# Patient Record
Sex: FEMALE | Race: WHITE | NOT HISPANIC OR LATINO | Employment: OTHER | URBAN - METROPOLITAN AREA
[De-identification: names, ages, dates, MRNs, and addresses within clinical notes are randomized per-mention and may not be internally consistent; named-entity substitution may affect disease eponyms.]

---

## 2017-03-24 ENCOUNTER — CONVERSION ENCOUNTER (OUTPATIENT)
Dept: MAMMOGRAPHY | Facility: CLINIC | Age: 52
End: 2017-03-24

## 2018-04-18 ENCOUNTER — CONVERSION ENCOUNTER (OUTPATIENT)
Dept: MAMMOGRAPHY | Facility: CLINIC | Age: 53
End: 2018-04-18

## 2019-03-25 ENCOUNTER — TRANSCRIBE ORDERS (OUTPATIENT)
Dept: ADMINISTRATIVE | Facility: HOSPITAL | Age: 54
End: 2019-03-25

## 2019-03-25 DIAGNOSIS — Z12.39 SCREENING BREAST EXAMINATION: Primary | ICD-10-CM

## 2019-04-17 ENCOUNTER — HOSPITAL ENCOUNTER (OUTPATIENT)
Dept: MAMMOGRAPHY | Facility: CLINIC | Age: 54
Discharge: HOME | End: 2019-04-17
Payer: COMMERCIAL

## 2019-04-17 VITALS — WEIGHT: 130 LBS | HEIGHT: 63 IN | BODY MASS INDEX: 23.04 KG/M2

## 2019-04-17 DIAGNOSIS — N64.4 BREAST PAIN, RIGHT: ICD-10-CM

## 2019-04-17 DIAGNOSIS — Z12.39 SCREENING BREAST EXAMINATION: ICD-10-CM

## 2019-04-17 PROCEDURE — 77066 DX MAMMO INCL CAD BI: CPT

## 2020-04-30 ENCOUNTER — TRANSCRIBE ORDERS (OUTPATIENT)
Dept: ADMINISTRATIVE | Facility: HOSPITAL | Age: 55
End: 2020-04-30

## 2020-04-30 DIAGNOSIS — Z12.31 VISIT FOR SCREENING MAMMOGRAM: Primary | ICD-10-CM

## 2020-06-05 ENCOUNTER — HOSPITAL ENCOUNTER (OUTPATIENT)
Dept: MAMMOGRAPHY | Facility: CLINIC | Age: 55
Discharge: HOME | End: 2020-06-05
Payer: COMMERCIAL

## 2020-06-05 VITALS — BODY MASS INDEX: 23.04 KG/M2 | WEIGHT: 130 LBS | HEIGHT: 63 IN

## 2020-06-05 DIAGNOSIS — Z12.31 VISIT FOR SCREENING MAMMOGRAM: ICD-10-CM

## 2020-06-05 PROCEDURE — 77067 SCR MAMMO BI INCL CAD: CPT

## 2020-06-05 PROCEDURE — 77063 BREAST TOMOSYNTHESIS BI: CPT

## 2021-03-10 DIAGNOSIS — Z23 ENCOUNTER FOR IMMUNIZATION: ICD-10-CM

## 2021-04-30 ENCOUNTER — TRANSCRIBE ORDERS (OUTPATIENT)
Dept: ADMINISTRATIVE | Facility: HOSPITAL | Age: 56
End: 2021-04-30

## 2021-04-30 DIAGNOSIS — Z12.31 ENCOUNTER FOR SCREENING MAMMOGRAM FOR MALIGNANT NEOPLASM OF BREAST: Primary | ICD-10-CM

## 2021-07-19 ENCOUNTER — HOSPITAL ENCOUNTER (OUTPATIENT)
Dept: MAMMOGRAPHY | Facility: CLINIC | Age: 56
Discharge: HOME | End: 2021-07-19
Payer: COMMERCIAL

## 2021-07-19 VITALS — HEIGHT: 63 IN | WEIGHT: 130 LBS | BODY MASS INDEX: 23.04 KG/M2

## 2021-07-19 DIAGNOSIS — Z12.31 ENCOUNTER FOR SCREENING MAMMOGRAM FOR MALIGNANT NEOPLASM OF BREAST: ICD-10-CM

## 2021-07-19 PROCEDURE — 77067 SCR MAMMO BI INCL CAD: CPT

## 2021-07-19 PROCEDURE — 77063 BREAST TOMOSYNTHESIS BI: CPT

## 2021-07-20 ENCOUNTER — HOSPITAL ENCOUNTER (OUTPATIENT)
Dept: MAMMOGRAPHY | Facility: CLINIC | Age: 56
Discharge: HOME | End: 2021-07-20

## 2021-07-20 DIAGNOSIS — N64.4 BREAST PAIN: ICD-10-CM

## 2021-09-23 PROBLEM — M54.2 CHRONIC NECK PAIN: Status: ACTIVE | Noted: 2018-05-14

## 2021-09-23 PROBLEM — G89.29 CHRONIC NECK PAIN: Status: ACTIVE | Noted: 2018-05-14

## 2021-09-23 PROBLEM — R13.10 DYSPHAGIA: Status: ACTIVE | Noted: 2021-09-23

## 2021-09-23 PROBLEM — R94.31 ABNORMAL EKG: Status: ACTIVE | Noted: 2018-02-26

## 2021-09-30 PROCEDURE — 88305 TISSUE EXAM BY PATHOLOGIST: CPT | Performed by: PATHOLOGY

## 2021-10-01 ENCOUNTER — LAB REQUISITION (OUTPATIENT)
Dept: LAB | Facility: HOSPITAL | Age: 56
End: 2021-10-01
Payer: COMMERCIAL

## 2021-10-01 DIAGNOSIS — R13.10 DYSPHAGIA, UNSPECIFIED: ICD-10-CM

## 2021-10-01 DIAGNOSIS — K29.70 GASTRITIS, UNSPECIFIED, WITHOUT BLEEDING: ICD-10-CM

## 2021-10-01 DIAGNOSIS — K20.0 EOSINOPHILIC ESOPHAGITIS: ICD-10-CM

## 2022-09-12 ENCOUNTER — HOSPITAL ENCOUNTER (OUTPATIENT)
Dept: MAMMOGRAPHY | Facility: MEDICAL CENTER | Age: 57
Discharge: HOME | End: 2022-09-12
Payer: COMMERCIAL

## 2022-09-12 VITALS — BODY MASS INDEX: 23.04 KG/M2 | HEIGHT: 63 IN | WEIGHT: 130 LBS

## 2022-09-12 DIAGNOSIS — Z12.31 ENCOUNTER FOR SCREENING MAMMOGRAM FOR MALIGNANT NEOPLASM OF BREAST: ICD-10-CM

## 2022-09-12 PROCEDURE — 77063 BREAST TOMOSYNTHESIS BI: CPT

## 2022-09-12 PROCEDURE — 77067 SCR MAMMO BI INCL CAD: CPT

## 2023-09-21 ENCOUNTER — HOSPITAL ENCOUNTER (OUTPATIENT)
Dept: MAMMOGRAPHY | Facility: MEDICAL CENTER | Age: 58
Discharge: HOME | End: 2023-09-21
Payer: COMMERCIAL

## 2023-09-21 VITALS — BODY MASS INDEX: 23.4 KG/M2 | WEIGHT: 132.06 LBS | HEIGHT: 63 IN

## 2023-09-21 DIAGNOSIS — Z12.31 ENCOUNTER FOR SCREENING MAMMOGRAM FOR MALIGNANT NEOPLASM OF BREAST: ICD-10-CM

## 2023-09-21 PROCEDURE — 77063 BREAST TOMOSYNTHESIS BI: CPT

## 2023-09-21 PROCEDURE — 77067 SCR MAMMO BI INCL CAD: CPT

## 2023-09-25 ENCOUNTER — HOSPITAL ENCOUNTER (OUTPATIENT)
Dept: ULTRASOUND IMAGING | Facility: CLINIC | Age: 58
Discharge: HOME | End: 2023-09-25
Payer: COMMERCIAL

## 2023-09-25 ENCOUNTER — HOSPITAL ENCOUNTER (OUTPATIENT)
Dept: MAMMOGRAPHY | Facility: CLINIC | Age: 58
Discharge: HOME | End: 2023-09-25
Payer: COMMERCIAL

## 2023-09-25 DIAGNOSIS — R92.8 ABNORMAL SCREENING MAMMOGRAM: ICD-10-CM

## 2023-09-25 PROCEDURE — G0279 TOMOSYNTHESIS, MAMMO: HCPCS

## 2023-09-25 PROCEDURE — 76642 ULTRASOUND BREAST LIMITED: CPT

## 2023-09-25 PROCEDURE — 77065 DX MAMMO INCL CAD UNI: CPT

## 2023-09-25 NOTE — PROGRESS NOTES
Met with patient and Dr. Adriane Diaz  regarding recommendation for;    _____ RIGHT ___X___LEFT      __X___Ultrasound guided  ______Stereotactic breast biopsy. __X___Verbalized understanding.       Blood thinners:  No: __X___ Yes: ______ What:                 Biopsy teaching sheet given:  Yes: ___X___ No: ________    Pt given contact information and adv to call with any questions/needs    Patient advised to arrive at 2:30 pm for a 3:00 pm appointment

## 2023-09-26 ENCOUNTER — HOSPITAL ENCOUNTER (OUTPATIENT)
Dept: ULTRASOUND IMAGING | Facility: CLINIC | Age: 58
Discharge: HOME | End: 2023-09-26
Payer: COMMERCIAL

## 2023-09-26 ENCOUNTER — HOSPITAL ENCOUNTER (OUTPATIENT)
Dept: MAMMOGRAPHY | Facility: CLINIC | Age: 58
Discharge: HOME | End: 2023-09-26
Payer: COMMERCIAL

## 2023-09-26 VITALS — DIASTOLIC BLOOD PRESSURE: 84 MMHG | HEART RATE: 78 BPM | SYSTOLIC BLOOD PRESSURE: 140 MMHG

## 2023-09-26 DIAGNOSIS — R92.8 ABNORMAL SCREENING MAMMOGRAM: ICD-10-CM

## 2023-09-26 PROCEDURE — A4648 IMPLANTABLE TISSUE MARKER: HCPCS

## 2023-09-26 PROCEDURE — 19083 BX BREAST 1ST LESION US IMAG: CPT

## 2023-09-26 PROCEDURE — 88341 IMHCHEM/IMCYTCHM EA ADD ANTB: CPT | Performed by: STUDENT IN AN ORGANIZED HEALTH CARE EDUCATION/TRAINING PROGRAM

## 2023-09-26 PROCEDURE — 88305 TISSUE EXAM BY PATHOLOGIST: CPT | Performed by: STUDENT IN AN ORGANIZED HEALTH CARE EDUCATION/TRAINING PROGRAM

## 2023-09-26 PROCEDURE — 88342 IMHCHEM/IMCYTCHM 1ST ANTB: CPT | Performed by: STUDENT IN AN ORGANIZED HEALTH CARE EDUCATION/TRAINING PROGRAM

## 2023-09-26 RX ORDER — LIDOCAINE HYDROCHLORIDE 10 MG/ML
5 INJECTION, SOLUTION EPIDURAL; INFILTRATION; INTRACAUDAL; PERINEURAL ONCE
Status: COMPLETED | OUTPATIENT
Start: 2023-09-26 | End: 2023-09-26

## 2023-09-26 RX ADMIN — LIDOCAINE HYDROCHLORIDE 5 ML: 10 INJECTION, SOLUTION EPIDURAL; INFILTRATION; INTRACAUDAL; PERINEURAL at 14:43

## 2023-09-26 NOTE — PROGRESS NOTES
Ice pack given:    ___X__yes _____no    Discharge instructions signed by patient:    ____X_yes _____no    Discharge instructions given to patient:    ___X__yes _____no    Discharged via:    __X___ambulatory    _____wheelchair    _____stretcher    Stable on discharge:    __X___yes ____no

## 2023-09-26 NOTE — PROGRESS NOTES
Patient arrived via:    __X___ambulatory    _____wheelchair    _____stretcher      Domestic violence screen    ___X___negative______positive    Breast Implants:    ___x____yes _____no

## 2023-09-26 NOTE — PROGRESS NOTES
Procedure type:    __x___ultrasound guided _____stereotactic    Breast:    ___x__Left _____Right    Location:2:00 6 CMFN    Needle:12    # of passes:4    Clip:BUTTERFLY    Performed by: Dr. Romero Vicente held for 5 minutes by: Brandi Ramos RN    Stersteff Strips:    ___X__yes _____no    Ara Hunger aid:    __X___yes_____no    Tolerated procedure:    __X___yes _____no

## 2023-09-27 NOTE — PROGRESS NOTES
Post procedure call completed    Bleeding: _____yes __X___no (Pt denies)    Pain: ___X__yes ______no (Pt states she has some soreness and is taking Tylenol and Advil as needed)    Redness/Swelling: ______yes ___X___no    Band aid removed: _____yes ___X__no (discussed removing when she showers)    Steri-Strips intact: ___X___yes _____no (discussed with patient to remove steri strips on Sunday if they have not come off on their own)    Pt with no questions at this time, adv will call when results available, adv to call with any questions or concerns, has name/# for contact.

## 2023-09-29 ENCOUNTER — TELEPHONE (OUTPATIENT)
Dept: MAMMOGRAPHY | Facility: CLINIC | Age: 58
End: 2023-09-29

## 2023-09-29 ENCOUNTER — TELEPHONE (OUTPATIENT)
Dept: HEMATOLOGY ONCOLOGY | Facility: CLINIC | Age: 58
End: 2023-09-29

## 2023-09-29 PROCEDURE — 88305 TISSUE EXAM BY PATHOLOGIST: CPT | Performed by: STUDENT IN AN ORGANIZED HEALTH CARE EDUCATION/TRAINING PROGRAM

## 2023-09-29 PROCEDURE — 88342 IMHCHEM/IMCYTCHM 1ST ANTB: CPT | Performed by: STUDENT IN AN ORGANIZED HEALTH CARE EDUCATION/TRAINING PROGRAM

## 2023-09-29 PROCEDURE — 88341 IMHCHEM/IMCYTCHM EA ADD ANTB: CPT | Performed by: STUDENT IN AN ORGANIZED HEALTH CARE EDUCATION/TRAINING PROGRAM

## 2023-09-29 NOTE — TELEPHONE ENCOUNTER
[facility] Surgical Oncology Referral    Diagnosis: intraductal papilloma with atypical ductal hyperplasia    Is this diagnosis cancer (Y/N): N    Biopsy Date: 9/26/23    Does the patient have another biopsy pending: N  If so, when:    Preferred provider: Dr Chris Zhong    Preferred location:     Any requests for dates/times: Mondays - pt available all day. Thursdays - pt only available in the morning.      Any additional information:     Please advise when appointment is made YES

## 2023-09-29 NOTE — TELEPHONE ENCOUNTER
High Risk Call to Patient    Call placed to patient and she was given biopsy results, questions answered, adv next step is to make appt with breast surgeon, options discussed and patient chose [address], patient informed [facility] [place] referral will be sent and they will be in touch to set up an appointment. Pt with no further questions at this time, pt has my name/# for any further needs/concerns.

## 2023-09-29 NOTE — TELEPHONE ENCOUNTER
(RBC)  I called Cj Hess in response to a referral that was received for patient to establish care with Surgical Oncology. Outreach was made to schedule a consultation. I left a voicemail explaining the reason for my call and advised patient to call [facility] at [phone]. Another attempt will be made to contact patient. [facility] Surgical Oncology Referral     Diagnosis: intraductal papilloma with atypical ductal hyperplasia     Is this diagnosis cancer (Y/N): N     Biopsy Date: 9/26/23     Does the patient have another biopsy pending: N  If so, when:     Preferred provider: Dr Chris Zhong     Preferred location:      Any requests for dates/times: Mondays - pt available all day.  Thursdays - pt only available in the morning.      Any additional information:      Please advise when appointment is made YES

## 2023-09-29 NOTE — TELEPHONE ENCOUNTER
High Risk Call to Patient  Call placed to patient and she was given biopsy results, questions answered, adv next step is to make appt with breast surgeon, options discussed and patient chose Dr Unique Cerna, patient referred to the [facility] [place] and they will reach out to set up an appointment for follow up, pt with no further questions at this time, pt has my name/# for any further needs/concerns. Pathology report and note routed to the patient's provider Dr Joe Tapia.

## 2023-10-06 ENCOUNTER — TELEPHONE (OUTPATIENT)
Dept: SURGICAL ONCOLOGY | Facility: CLINIC | Age: 58
End: 2023-10-06

## 2023-10-13 ENCOUNTER — TELEPHONE (OUTPATIENT)
Dept: HEMATOLOGY ONCOLOGY | Facility: CLINIC | Age: 58
End: 2023-10-13

## 2023-10-13 NOTE — TELEPHONE ENCOUNTER
Patient Call    Who are you speaking with? Patient    If it is not the patient, are they listed on an active communication consent form? N/A   What is the reason for this call? Pt called for a sooner appt with Dr. Lang Mendoza. I advised there was nothing sooner than 11/22   Does this require a call back? N/A   If a call back is required, please list best call back number N/a   If a call back is required, advise that a message will be forwarded to their care team and someone will return their call as soon as possible. Did you relay this information to the patient?  N/A

## 2023-10-16 ENCOUNTER — TELEPHONE (OUTPATIENT)
Dept: SURGICAL ONCOLOGY | Facility: CLINIC | Age: 58
End: 2023-10-16

## 2023-10-16 NOTE — TELEPHONE ENCOUNTER
Called and spoke to patient and offered to moved her appointment up from 11/22/23. Patient was agreeable to new appointment on 10/17/23 at [address] [facility] in the [facility] office. Confirmed location.

## 2023-10-17 ENCOUNTER — CONSULT (OUTPATIENT)
Dept: SURGICAL ONCOLOGY | Facility: CLINIC | Age: 58
End: 2023-10-17
Payer: COMMERCIAL

## 2023-10-17 VITALS
OXYGEN SATURATION: 98 % | HEART RATE: 79 BPM | RESPIRATION RATE: 16 BRPM | SYSTOLIC BLOOD PRESSURE: 154 MMHG | TEMPERATURE: 97.5 F | DIASTOLIC BLOOD PRESSURE: 108 MMHG | BODY MASS INDEX: 23.11 KG/M2 | WEIGHT: 130.4 LBS | HEIGHT: 63 IN

## 2023-10-17 DIAGNOSIS — Z80.0 FAMILY HISTORY OF PANCREATIC CANCER: ICD-10-CM

## 2023-10-17 DIAGNOSIS — D36.9 INTRADUCTAL PAPILLOMA: ICD-10-CM

## 2023-10-17 DIAGNOSIS — N60.92 ATYPICAL DUCTAL HYPERPLASIA OF LEFT BREAST: Primary | ICD-10-CM

## 2023-10-17 DIAGNOSIS — Z80.3 FAMILY HISTORY OF BREAST CANCER: ICD-10-CM

## 2023-10-17 PROCEDURE — 99214 OFFICE O/P EST MOD 30 MIN: CPT | Performed by: SURGERY

## 2023-10-17 RX ORDER — IBUPROFEN 600 MG/1
TABLET ORAL
COMMUNITY

## 2023-10-17 RX ORDER — MELOXICAM 7.5 MG/1
TABLET ORAL
COMMUNITY

## 2023-10-17 RX ORDER — TRIAMCINOLONE ACETONIDE 1 MG/G
CREAM TOPICAL
COMMUNITY

## 2023-10-17 RX ORDER — VALACYCLOVIR HYDROCHLORIDE 1 G/1
TABLET, FILM COATED ORAL AS NEEDED
COMMUNITY

## 2023-10-17 RX ORDER — LIDOCAINE HYDROCHLORIDE 20 MG/ML
INJECTION, SOLUTION INFILTRATION; PERINEURAL
COMMUNITY

## 2023-10-17 RX ORDER — CEFAZOLIN SODIUM 1 G/50ML
1000 SOLUTION INTRAVENOUS
OUTPATIENT
Start: 2023-10-17

## 2023-10-17 RX ORDER — ALPRAZOLAM 0.25 MG/1
0.25 TABLET ORAL 3 TIMES DAILY PRN
COMMUNITY
Start: 2023-09-27 | End: 2024-03-25

## 2023-10-17 RX ORDER — FUROSEMIDE 20 MG/1
TABLET ORAL
COMMUNITY

## 2023-10-17 RX ORDER — METHYLPREDNISOLONE ACETATE 40 MG/ML
INJECTION, SUSPENSION INTRA-ARTICULAR; INTRALESIONAL; INTRAMUSCULAR; SOFT TISSUE
COMMUNITY
Start: 2023-08-10

## 2023-10-17 RX ORDER — MICONAZOLE NITRATE, ZINC OXIDE, WHITE PETROLATUM 2.5; 150; 813.5 MG/G; MG/G; MG/G
OINTMENT TOPICAL AS NEEDED
COMMUNITY

## 2023-10-17 NOTE — PROGRESS NOTES
Breast Consultation-Surgical Oncology     [facility]  [facility] [address] [facility] [id number]    Name:  Laine Trevino  YOB: 1965  MRN:  [id number]    Assessment/Plan   Diagnoses and all orders for this visit:    Atypical ductal hyperplasia of left breast    Family history of breast cancer  -     Ambulatory Referral to Oncology Genetics; Future    Intraductal papilloma    Family history of pancreatic cancer  -     Ambulatory Referral to Oncology Genetics; Future            HPI: Laine Trevino is a 60y.o. year old female who presents with abnormal imaging of the left breast.  She then went on to have a diagnostic mammogram, ultrasound and biopsy revealing an intraductal papilloma with atypical duct hyperplasia. She does have family history of breast and pancreatic cancer. She is interested in having genetic testing. Surgical treatment to date consisted of not applicable. Oncology History:    Oncology History    No history exists.        Pertinent reproductive history:  Age at menarche:    OB History          4    Para   4    Term   4            AB        Living             SAB        IAB        Ectopic        Multiple        Live Births   4                   Problem List:   Patient Active Problem List   Diagnosis    Tobacco use disorder    Seasonal allergic rhinitis    Hypertriglyceridemia    Family history of breast cancer    Atopic dermatitis and related condition    Chronic neck pain    Abnormal EKG    Dysphagia    Intraductal papilloma    Atypical ductal hyperplasia of left breast    Family history of pancreatic cancer     Past Medical History:   Diagnosis Date    Anxiety     Hypertension      Past Surgical History:   Procedure Laterality Date    AUGMENTATION MAMMAPLASTY Bilateral     saline    BREAST BIOPSY Left 2023    EGD  2021    Intrinsic moderate stenosis dilated with 47 [address], erosive gastritis-biopsy negative for H. pylori by Dr. Nelly Anthony      age 45    [place] Left 9/26/2023     Family History   Problem Relation Age of Onset    Breast cancer Mother 59    No Known Problems Father     No Known Problems Daughter     No Known Problems Maternal Grandmother     No Known Problems Maternal Grandfather     No Known Problems Paternal Grandmother     No Known Problems Paternal Grandfather     Breast cancer Maternal Aunt 72     Social History     Socioeconomic History    Marital status: /Civil Union     Spouse name: Not on file    Number of children: Not on file    Years of education: Not on file    Highest education level: Not on file   Occupational History    Occupation: [employer]   Tobacco Use    Smoking status: Some Days     Types: Cigarettes    Smokeless tobacco: Never    Tobacco comments:     1 cig a day    Substance and Sexual Activity    Alcohol use:  Yes     Alcohol/week: 7.0 standard drinks of alcohol     Types: 7 Glasses of wine per week    Drug use: Never    Sexual activity: Not on file   Other Topics Concern    Not on file   Social History Narrative    Not on file     Social Determinants of Health     Financial Resource Strain: Not on file   Food Insecurity: Not on file   Transportation Needs: Not on file   Physical Activity: Not on file   Stress: Not on file   Social Connections: Not on file   Intimate Partner Violence: Not on file   Housing Stability: Not on file     Current Outpatient Medications   Medication Sig Dispense Refill    ALPRAZolam (XANAX) 0.25 mg tablet Take 0.25 mg by mouth Three times daily as needed      Cholecalciferol (VITAMIN D3 PO) Take by mouth in the morning      clonazePAM (KlonoPIN) 0.5 mg tablet Take 0.5 mg by mouth if needed      Cyanocobalamin (VITAMIN B-12 PO) Take by mouth in the morning      famotidine (PEPCID) 40 MG tablet Take 1 tablet (40 mg total) by mouth 2 (two) times a day 60 tablet 5    furosemide (LASIX) 20 mg tablet furosemide 20 mg tablet   TAKE ONE-HALF TABLET BY MOUTH DAILY AS NEEDED FOR LEG SWELLING      ibuprofen (MOTRIN) 600 mg tablet TAKE ONE TABLET BY MOUTH EVERY 8 HOURS AS NEEDED FOR MILD PAIN      lidocaine (XYLOCAINE) 2 % lidocaine HCl 20 mg/mL (2 %) injection solution   administered      meloxicam (MOBIC) 7.5 mg tablet meloxicam 7.5 mg tablet   TAKE ONE TABLET BY MOUTH EVERY DAY AS NEEDED      methylPREDNISolone acetate (DEPO-Medrol) 40 mg/mL injection administered      Miconazole-Zinc Oxide-Petrolat 0.25-15-81.35 % OINT if needed (for rash on lips)      Omega-3 Fatty Acids (FISH OIL PO) Take by mouth in the morning      Probiotic Product (PROBIOTIC PO) Take by mouth in the morning      Pyridoxine HCl (B-6 PO) Take by mouth      triamcinolone (KENALOG) 0.1 % cream APPLY TWICE DAILY TO AFFECTED AREAS AS DIRECTED      valACYclovir (VALTREX) 1,000 mg tablet if needed      valsartan (DIOVAN) 80 mg tablet Take 160 mg by mouth daily      omeprazole (PriLOSEC) 20 mg delayed release capsule Take 20 mg by mouth daily (Patient not taking: Reported on 2/16/2023)       No current facility-administered medications for this visit. Allergies   Allergen Reactions    Sulfa Antibiotics Hives    Amlodipine Other (See Comments)     Possible  ankle swelling on 5 mg    Lisinopril Other (See Comments)     dizziness    Methocarbamol Other (See Comments)     Nightmares    Other Sneezing     Seasonal allergies    Celebrex [Celecoxib] Tongue Swelling    Sulfamethoxazole-Trimethoprim Hives         The following portions of the patient's history were reviewed and updated as appropriate: allergies, current medications, past family history, past medical history, past social history, past surgical history, and problem list.    Review of Systems:  Review of Systems   Constitutional: Negative. Negative for appetite change, fever and unexpected weight change. HENT: Negative. Negative for trouble swallowing.     Eyes: Negative. Respiratory: Negative. Negative for cough and shortness of breath. Cardiovascular: Negative. Negative for chest pain. Gastrointestinal: Negative. Negative for abdominal pain, nausea and vomiting. Endocrine: Negative. Genitourinary: Negative. Negative for dysuria. Musculoskeletal: Negative. Negative for arthralgias and myalgias. Skin: Negative. Allergic/Immunologic: Positive for environmental allergies. Neurological: Negative. Negative for headaches. Hematological: Negative. Negative for adenopathy. Does not bruise/bleed easily. Psychiatric/Behavioral: Negative. Physical Exam:  Physical Exam  Vitals reviewed. Constitutional:       General: She is not in acute distress. Appearance: Normal appearance. She is well-developed. HENT:      Head: Normocephalic and atraumatic. Neck:      Thyroid: No thyromegaly. Vascular: No JVD. Trachea: No tracheal deviation. Cardiovascular:      Heart sounds: Normal heart sounds. Pulmonary:      Breath sounds: Normal breath sounds. Chest:   Breasts:     Breasts are symmetrical.      Right: No swelling, bleeding, inverted nipple, mass, nipple discharge, skin change or tenderness. Left: Skin change (resolving ecchymosis) present. No swelling, bleeding, inverted nipple, mass, nipple discharge or tenderness. Abdominal:      Palpations: Abdomen is soft. There is no hepatomegaly. Musculoskeletal:      Right lower leg: No edema. Left lower leg: No edema. Lymphadenopathy:      Upper Body:      Right upper body: No supraclavicular, axillary or pectoral adenopathy. Left upper body: No supraclavicular, axillary or pectoral adenopathy. Neurological:      Mental Status: She is alert and oriented to person, place, and time. Cranial Nerves: No cranial nerve deficit.    Psychiatric:         Mood and Affect: Mood normal.         Behavior: Behavior normal.     Laboratory:    9/26/2023 core biopsy left breast 2:00      Pathology revealed: Intraductal papilloma with atypical duct hyperplasia            Imagin2023 bilateral 3D screening mammogram was a BI-RADS 0 secondary to a mass in the 2:00 axis    2023 left 3D diagnostic mammogram and ultrasound shows the same with a 5 mm correlate seen on ultrasound for which biopsy was recommended as noted above    2023 postbiopsy mammogram is concordant, standard clip in place            Discussion/Summary: 39-year-old female who presents with abnormal imaging of the left breast.  Her biopsy revealed a papilloma with atypical ductal hyperplasia. She was counseled on PELON localized lumpectomy. She understands the rationale for this. We also discussed her family history of breast cancer in her mother and maternal aunt as well as pancreatic cancer in the maternal uncle. I am referring her to our genetic counselors as well. All of her questions were answered. Consent was signed today in the office. She will be scheduled for surgery in the near term. She needs to have her [facility] reflector placed.

## 2023-10-17 NOTE — PROGRESS NOTES
Call placed to patient regarding recommendation for;    ___ RIGHT ____x__LEFT      __X___SAVI  placement. Procedure explained to patient, additional questions answered at this time    __X___Verbalized understanding.       Blood thinners:  _____yes __X___no    Date stopped: ___N/A____    All teaching points discussed during call, pt with no questions at this time, pt adv to arrive at 1:00 for 1:30 insertion    Pt given name/# for any further questions/needs

## 2023-10-18 ENCOUNTER — TELEPHONE (OUTPATIENT)
Dept: HEMATOLOGY ONCOLOGY | Facility: CLINIC | Age: 58
End: 2023-10-18

## 2023-10-18 NOTE — TELEPHONE ENCOUNTER
I called Leonel Steward in response to a referral that was received for patient to establish care with Cancer Risk and Genetics. Outreach was made to schedule a consultation. The patient states she was seen in Dr Kellen Steward office, and that she needed a letter of medical necessity and supporting documents to make sure that the testing would be covered. The patient asked that I send a message to Dr Kellen Steward office for an update on these documents. The referral has been closed. The patient is aware she can schedule at a later date when she is ready.

## 2023-10-18 NOTE — TELEPHONE ENCOUNTER
Patient Call    Who are you speaking with? Patient    If it is not the patient, are they listed on an active communication consent form? N/A   What is the reason for this call? The patient would like an update on whether Dr Matthew Figueroa was able to write her letter of medical necessity and send the supporting documents to her insurance so that she is able to know if her genetic testing will be covered. Patient does not wish to schedule until this is complete. Does this require a call back? Yes   If a call back is required, please list best call back number [phone]   If a call back is required, advise that a message will be forwarded to their care team and someone will return their call as soon as possible. Did you relay this information to the patient?  Yes

## 2023-10-19 ENCOUNTER — APPOINTMENT (OUTPATIENT)
Dept: LAB | Facility: HOSPITAL | Age: 58
End: 2023-10-19
Payer: COMMERCIAL

## 2023-10-19 ENCOUNTER — LAB (OUTPATIENT)
Dept: LAB | Facility: HOSPITAL | Age: 58
End: 2023-10-19
Payer: COMMERCIAL

## 2023-10-19 ENCOUNTER — HOSPITAL ENCOUNTER (OUTPATIENT)
Dept: RADIOLOGY | Facility: HOSPITAL | Age: 58
Discharge: HOME | End: 2023-10-19
Payer: COMMERCIAL

## 2023-10-19 DIAGNOSIS — D36.9 INTRADUCTAL PAPILLOMA: ICD-10-CM

## 2023-10-19 DIAGNOSIS — N60.92 ATYPICAL DUCTAL HYPERPLASIA OF LEFT BREAST: ICD-10-CM

## 2023-10-19 LAB
ALBUMIN SERPL BCP-MCNC: 4.5 G/DL (ref 3.5–5)
ALP SERPL-CCNC: 54 U/L (ref 34–104)
ALT SERPL W P-5'-P-CCNC: 15 U/L (ref 7–52)
ANION GAP SERPL CALCULATED.3IONS-SCNC: 5 MMOL/L
AST SERPL W P-5'-P-CCNC: 18 U/L (ref 13–39)
ATRIAL RATE: 74 BPM
BACTERIA UR QL AUTO: NORMAL /HPF
BASOPHILS # BLD AUTO: 0.08 THOUSANDS/ÂΜL (ref 0–0.1)
BASOPHILS NFR BLD AUTO: 1 % (ref 0–1)
BILIRUB SERPL-MCNC: 0.92 MG/DL (ref 0.2–1)
BILIRUB UR QL STRIP: NEGATIVE
BUN SERPL-MCNC: 14 MG/DL (ref 5–25)
CALCIUM SERPL-MCNC: 9.1 MG/DL (ref 8.4–10.2)
CHLORIDE SERPL-SCNC: 103 MMOL/L (ref 96–108)
CLARITY UR: CLEAR
CO2 SERPL-SCNC: 29 MMOL/L (ref 21–32)
COLOR UR: COLORLESS
CREAT SERPL-MCNC: 0.71 MG/DL (ref 0.6–1.3)
EOSINOPHIL # BLD AUTO: 0.55 THOUSAND/ÂΜL (ref 0–0.61)
EOSINOPHIL NFR BLD AUTO: 9 % (ref 0–6)
ERYTHROCYTE [DISTWIDTH] IN BLOOD BY AUTOMATED COUNT: 11.9 % (ref 11.6–15.1)
GFR SERPL CREATININE-BSD FRML MDRD: 93 ML/MIN/1.73SQ M
GLUCOSE P FAST SERPL-MCNC: 86 MG/DL (ref 65–99)
GLUCOSE UR STRIP-MCNC: NEGATIVE MG/DL
HCT VFR BLD AUTO: 46.8 % (ref 34.8–46.1)
HGB BLD-MCNC: 15.2 G/DL (ref 11.5–15.4)
HGB UR QL STRIP.AUTO: ABNORMAL
IMM GRANULOCYTES # BLD AUTO: 0.01 THOUSAND/UL (ref 0–0.2)
IMM GRANULOCYTES NFR BLD AUTO: 0 % (ref 0–2)
KETONES UR STRIP-MCNC: NEGATIVE MG/DL
LEUKOCYTE ESTERASE UR QL STRIP: NEGATIVE
LYMPHOCYTES # BLD AUTO: 1.97 THOUSANDS/ÂΜL (ref 0.6–4.47)
LYMPHOCYTES NFR BLD AUTO: 30 % (ref 14–44)
MCH RBC QN AUTO: 31 PG (ref 26.8–34.3)
MCHC RBC AUTO-ENTMCNC: 32.5 G/DL (ref 31.4–37.4)
MCV RBC AUTO: 95 FL (ref 82–98)
MONOCYTES # BLD AUTO: 0.49 THOUSAND/ÂΜL (ref 0.17–1.22)
MONOCYTES NFR BLD AUTO: 8 % (ref 4–12)
NEUTROPHILS # BLD AUTO: 3.39 THOUSANDS/ÂΜL (ref 1.85–7.62)
NEUTS SEG NFR BLD AUTO: 52 % (ref 43–75)
NITRITE UR QL STRIP: NEGATIVE
NON-SQ EPI CELLS URNS QL MICRO: NORMAL /HPF
NRBC BLD AUTO-RTO: 0 /100 WBCS
P AXIS: 43 DEGREES
PH UR STRIP.AUTO: 5.5 [PH]
PLATELET # BLD AUTO: 403 THOUSANDS/UL (ref 149–390)
PMV BLD AUTO: 10 FL (ref 8.9–12.7)
POTASSIUM SERPL-SCNC: 3.5 MMOL/L (ref 3.5–5.3)
PR INTERVAL: 132 MS
PROT SERPL-MCNC: 7.2 G/DL (ref 6.4–8.4)
PROT UR STRIP-MCNC: NEGATIVE MG/DL
QRS AXIS: -15 DEGREES
QRSD INTERVAL: 90 MS
QT INTERVAL: 376 MS
QTC INTERVAL: 417 MS
RBC # BLD AUTO: 4.91 MILLION/UL (ref 3.81–5.12)
RBC #/AREA URNS AUTO: NORMAL /HPF
SODIUM SERPL-SCNC: 137 MMOL/L (ref 135–147)
SP GR UR STRIP.AUTO: 1.01 (ref 1–1.03)
T WAVE AXIS: 43 DEGREES
UROBILINOGEN UR STRIP-ACNC: <2 MG/DL
VENTRICULAR RATE: 74 BPM
WBC # BLD AUTO: 6.49 THOUSAND/UL (ref 4.31–10.16)
WBC #/AREA URNS AUTO: NORMAL /HPF

## 2023-10-19 PROCEDURE — 80053 COMPREHEN METABOLIC PANEL: CPT

## 2023-10-19 PROCEDURE — 36415 COLL VENOUS BLD VENIPUNCTURE: CPT

## 2023-10-19 PROCEDURE — 93005 ELECTROCARDIOGRAM TRACING: CPT

## 2023-10-19 PROCEDURE — 71046 X-RAY EXAM CHEST 2 VIEWS: CPT

## 2023-10-19 PROCEDURE — 81001 URINALYSIS AUTO W/SCOPE: CPT | Performed by: SURGERY

## 2023-10-19 PROCEDURE — 85025 COMPLETE CBC W/AUTO DIFF WBC: CPT

## 2023-10-20 NOTE — PRE-PROCEDURE INSTRUCTIONS
Pre-Surgery Instructions:   Medication Instructions    ALPRAZolam (XANAX) 0.25 mg tablet Uses PRN- OK to take day of surgery    Cholecalciferol (VITAMIN D3 PO) Stop taking 7 days prior to surgery. clonazePAM (KlonoPIN) 0.5 mg tablet Take night before surgery    Cyanocobalamin (VITAMIN B-12 PO) Stop taking 7 days prior to surgery. famotidine (PEPCID) 40 MG tablet Uses PRN- OK to take day of surgery    furosemide (LASIX) 20 mg tablet Hold day of surgery. ibuprofen (MOTRIN) 600 mg tablet Stop taking 7 days prior to surgery. Miconazole-Zinc Oxide-Petrolat 0.25-15-81.35 % OINT Hold day of surgery. Omega-3 Fatty Acids (FISH OIL PO) Stop taking 7 days prior to surgery. Probiotic Product (PROBIOTIC PO) Instructions provided by MD    Pyridoxine HCl (B-6 PO) Stop taking 7 days prior to surgery. triamcinolone (KENALOG) 0.1 % cream Uses PRN- DO NOT take day of surgery    valsartan (DIOVAN) 80 mg tablet Hold day of surgery. Medication instructions for day surgery reviewed. Please use only a sip of water to take your instructed medications. Avoid all over the counter vitamins, supplements and NSAIDS for one week prior to surgery per anesthesia guidelines. Tylenol is ok to take as needed. You will receive a call one business day prior to surgery with an arrival time and hospital directions. If your surgery is scheduled on a Monday, the hospital will be calling you on the Friday prior to your surgery. If you have not heard from anyone by 8pm, please call the hospital supervisor through the hospital  at [phone]. Kelsey Flowers [phone]). Do not eat or drink anything after midnight the night before your surgery, including candy, mints, lifesavers, or chewing gum. Do not drink alcohol 24hrs before your surgery. Try not to smoke at least 24hrs before your surgery.        Follow the pre surgery showering instructions as listed in the [facility] Surgical Experience Booklet” or otherwise provided by your surgeon's office. Do not shave the surgical area 24 hours before surgery. Do not apply any lotions, creams, including makeup, cologne, deodorant, or perfumes after showering on the day of your surgery. No contact lenses, eye make-up, or artificial eyelashes. Remove nail polish, including gel polish, and any artificial, gel, or acrylic nails if possible. Remove all jewelry including rings and body piercing jewelry. Wear causal clothing that is easy to take on and off. Consider your type of surgery. Keep any valuables, jewelry, piercings at home. Please bring any specially ordered equipment (sling, braces) if indicated. Arrange for a responsible person to drive you to and from the hospital on the day of your surgery. Visitor Guidelines discussed. Call the surgeon's office with any new illnesses, exposures, or additional questions prior to surgery. Please reference your [facility] Surgical Experience Booklet” for additional information to prepare for your upcoming surgery.

## 2023-10-23 ENCOUNTER — HOSPITAL ENCOUNTER (OUTPATIENT)
Dept: ULTRASOUND IMAGING | Facility: CLINIC | Age: 58
Discharge: HOME | End: 2023-10-23

## 2023-10-27 ENCOUNTER — HOSPITAL ENCOUNTER (OUTPATIENT)
Dept: ULTRASOUND IMAGING | Facility: CLINIC | Age: 58
Discharge: HOME | End: 2023-10-27
Payer: COMMERCIAL

## 2023-10-27 ENCOUNTER — HOSPITAL ENCOUNTER (OUTPATIENT)
Dept: MAMMOGRAPHY | Facility: CLINIC | Age: 58
Discharge: HOME | End: 2023-10-27
Payer: COMMERCIAL

## 2023-10-27 VITALS — SYSTOLIC BLOOD PRESSURE: 142 MMHG | DIASTOLIC BLOOD PRESSURE: 84 MMHG | HEART RATE: 76 BPM

## 2023-10-27 DIAGNOSIS — D36.9 INTRADUCTAL PAPILLOMA: ICD-10-CM

## 2023-10-27 DIAGNOSIS — N60.92 ATYPICAL DUCTAL HYPERPLASIA OF LEFT BREAST: ICD-10-CM

## 2023-10-27 DIAGNOSIS — R92.8 ABNORMAL MAMMOGRAM: ICD-10-CM

## 2023-10-27 PROCEDURE — 19285 PERQ DEV BREAST 1ST US IMAG: CPT

## 2023-10-27 RX ORDER — LIDOCAINE HYDROCHLORIDE 10 MG/ML
5 INJECTION, SOLUTION EPIDURAL; INFILTRATION; INTRACAUDAL; PERINEURAL ONCE
Status: COMPLETED | OUTPATIENT
Start: 2023-10-27 | End: 2023-10-27

## 2023-10-27 RX ADMIN — LIDOCAINE HYDROCHLORIDE 5 ML: 10 INJECTION, SOLUTION EPIDURAL; INFILTRATION; INTRACAUDAL; PERINEURAL at 13:36

## 2023-10-27 NOTE — PROGRESS NOTES
Procedure type:    ___x__ultrasound guided _____stereotactic    Breast:    ___x__Left _____Right    Location: 2 o'clock 6cmfn    Needle:16G    # of passes:1    Clip:7.5cm Laine  Clip Placement    Performed by:Dr. Jose Diamond    Pressure held for 5 minutes by:Jerica Giordano    Stersteff Strips:    ___yes ___x__no    Band aid:    ____yes__x___no (paper tape)    Tolerated procedure:    __X___yes _____no

## 2023-10-30 ENCOUNTER — ANESTHESIA EVENT (OUTPATIENT)
Dept: PERIOP | Facility: HOSPITAL | Age: 58
End: 2023-10-30
Payer: COMMERCIAL

## 2023-10-30 NOTE — PROGRESS NOTES
Post procedure call completed 10/30/2023 9 ;15    Bleeding: _____yes __X___no    Pain: _____yes ___X___no    Redness/Swelling: ______yes ___X___no    Band aid removed: __x___yes ___no         Pt with no questions at this time, , adv to call with any questions or concerns, has name/# for contact

## 2023-10-31 ENCOUNTER — ANESTHESIA (OUTPATIENT)
Dept: PERIOP | Facility: HOSPITAL | Age: 58
End: 2023-10-31
Payer: COMMERCIAL

## 2023-10-31 ENCOUNTER — APPOINTMENT (OUTPATIENT)
Dept: MAMMOGRAPHY | Facility: HOSPITAL | Age: 58
End: 2023-10-31
Payer: COMMERCIAL

## 2023-10-31 ENCOUNTER — HOSPITAL ENCOUNTER (OUTPATIENT)
Facility: HOSPITAL | Age: 58
Setting detail: OUTPATIENT SURGERY
Discharge: HOME | End: 2023-10-31
Attending: SURGERY | Admitting: SURGERY
Payer: COMMERCIAL

## 2023-10-31 VITALS
HEIGHT: 63 IN | OXYGEN SATURATION: 95 % | SYSTOLIC BLOOD PRESSURE: 135 MMHG | TEMPERATURE: 97.8 F | HEART RATE: 70 BPM | RESPIRATION RATE: 16 BRPM | BODY MASS INDEX: 22.81 KG/M2 | WEIGHT: 128.75 LBS | DIASTOLIC BLOOD PRESSURE: 72 MMHG

## 2023-10-31 DIAGNOSIS — N60.92 ATYPICAL DUCTAL HYPERPLASIA OF LEFT BREAST: ICD-10-CM

## 2023-10-31 DIAGNOSIS — D36.9 INTRADUCTAL PAPILLOMA: ICD-10-CM

## 2023-10-31 PROCEDURE — 76098 X-RAY EXAM SURGICAL SPECIMEN: CPT | Performed by: SURGERY

## 2023-10-31 PROCEDURE — 19301 PARTIAL MASTECTOMY: CPT | Performed by: PHYSICIAN ASSISTANT

## 2023-10-31 PROCEDURE — 88342 IMHCHEM/IMCYTCHM 1ST ANTB: CPT | Performed by: PATHOLOGY

## 2023-10-31 PROCEDURE — 19301 PARTIAL MASTECTOMY: CPT | Performed by: SURGERY

## 2023-10-31 PROCEDURE — 88341 IMHCHEM/IMCYTCHM EA ADD ANTB: CPT | Performed by: PATHOLOGY

## 2023-10-31 PROCEDURE — 88307 TISSUE EXAM BY PATHOLOGIST: CPT | Performed by: PATHOLOGY

## 2023-10-31 RX ORDER — DIPHENHYDRAMINE HYDROCHLORIDE 50 MG/ML
INJECTION INTRAMUSCULAR; INTRAVENOUS AS NEEDED
Status: DISCONTINUED | OUTPATIENT
Start: 2023-10-31 | End: 2023-10-31

## 2023-10-31 RX ORDER — SODIUM CHLORIDE 9 MG/ML
125 INJECTION, SOLUTION INTRAVENOUS CONTINUOUS
Status: DISCONTINUED | OUTPATIENT
Start: 2023-10-31 | End: 2023-10-31 | Stop reason: HOSPADM

## 2023-10-31 RX ORDER — ONDANSETRON 2 MG/ML
4 INJECTION INTRAMUSCULAR; INTRAVENOUS ONCE AS NEEDED
Status: DISCONTINUED | OUTPATIENT
Start: 2023-10-31 | End: 2023-10-31 | Stop reason: HOSPADM

## 2023-10-31 RX ORDER — BUPIVACAINE HYDROCHLORIDE 5 MG/ML
INJECTION, SOLUTION EPIDURAL; INTRACAUDAL AS NEEDED
Status: DISCONTINUED | OUTPATIENT
Start: 2023-10-31 | End: 2023-10-31 | Stop reason: HOSPADM

## 2023-10-31 RX ORDER — PHENYLEPHRINE HCL IN 0.9% NACL 1 MG/10 ML
SYRINGE (ML) INTRAVENOUS AS NEEDED
Status: DISCONTINUED | OUTPATIENT
Start: 2023-10-31 | End: 2023-10-31

## 2023-10-31 RX ORDER — MIDAZOLAM HYDROCHLORIDE 2 MG/2ML
INJECTION, SOLUTION INTRAMUSCULAR; INTRAVENOUS AS NEEDED
Status: DISCONTINUED | OUTPATIENT
Start: 2023-10-31 | End: 2023-10-31

## 2023-10-31 RX ORDER — ONDANSETRON 2 MG/ML
INJECTION INTRAMUSCULAR; INTRAVENOUS AS NEEDED
Status: DISCONTINUED | OUTPATIENT
Start: 2023-10-31 | End: 2023-10-31

## 2023-10-31 RX ORDER — ACETAMINOPHEN 325 MG/1
650 TABLET ORAL EVERY 6 HOURS PRN
Status: DISCONTINUED | OUTPATIENT
Start: 2023-10-31 | End: 2023-10-31 | Stop reason: HOSPADM

## 2023-10-31 RX ORDER — DEXAMETHASONE SODIUM PHOSPHATE 10 MG/ML
INJECTION, SOLUTION INTRAMUSCULAR; INTRAVENOUS AS NEEDED
Status: DISCONTINUED | OUTPATIENT
Start: 2023-10-31 | End: 2023-10-31

## 2023-10-31 RX ORDER — FENTANYL CITRATE 50 UG/ML
INJECTION, SOLUTION INTRAMUSCULAR; INTRAVENOUS AS NEEDED
Status: DISCONTINUED | OUTPATIENT
Start: 2023-10-31 | End: 2023-10-31

## 2023-10-31 RX ORDER — PROPOFOL 10 MG/ML
INJECTION, EMULSION INTRAVENOUS AS NEEDED
Status: DISCONTINUED | OUTPATIENT
Start: 2023-10-31 | End: 2023-10-31

## 2023-10-31 RX ORDER — LIDOCAINE HYDROCHLORIDE 20 MG/ML
INJECTION, SOLUTION EPIDURAL; INFILTRATION; INTRACAUDAL; PERINEURAL AS NEEDED
Status: DISCONTINUED | OUTPATIENT
Start: 2023-10-31 | End: 2023-10-31

## 2023-10-31 RX ORDER — KETOROLAC TROMETHAMINE 30 MG/ML
INJECTION, SOLUTION INTRAMUSCULAR; INTRAVENOUS AS NEEDED
Status: DISCONTINUED | OUTPATIENT
Start: 2023-10-31 | End: 2023-10-31

## 2023-10-31 RX ORDER — FENTANYL CITRATE/PF 50 MCG/ML
50 SYRINGE (ML) INJECTION
Status: DISCONTINUED | OUTPATIENT
Start: 2023-10-31 | End: 2023-10-31 | Stop reason: HOSPADM

## 2023-10-31 RX ORDER — CEFAZOLIN SODIUM 1 G/50ML
1000 SOLUTION INTRAVENOUS
Status: DISCONTINUED | OUTPATIENT
Start: 2023-10-31 | End: 2023-10-31 | Stop reason: HOSPADM

## 2023-10-31 RX ADMIN — CEFAZOLIN SODIUM 1000 MG: 1 SOLUTION INTRAVENOUS at 07:35

## 2023-10-31 RX ADMIN — FENTANYL CITRATE 25 MCG: 50 INJECTION, SOLUTION INTRAMUSCULAR; INTRAVENOUS at 07:51

## 2023-10-31 RX ADMIN — DIPHENHYDRAMINE HYDROCHLORIDE 25 MG: 50 INJECTION, SOLUTION INTRAMUSCULAR; INTRAVENOUS at 08:28

## 2023-10-31 RX ADMIN — KETOROLAC TROMETHAMINE 30 MG: 30 INJECTION, SOLUTION INTRAMUSCULAR; INTRAVENOUS at 08:27

## 2023-10-31 RX ADMIN — SODIUM CHLORIDE: 0.9 INJECTION, SOLUTION INTRAVENOUS at 08:26

## 2023-10-31 RX ADMIN — SODIUM CHLORIDE 125 ML/HR: 0.9 INJECTION, SOLUTION INTRAVENOUS at 05:50

## 2023-10-31 RX ADMIN — ACETAMINOPHEN 325MG 650 MG: 325 TABLET ORAL at 10:03

## 2023-10-31 RX ADMIN — Medication 100 MCG: at 08:18

## 2023-10-31 RX ADMIN — LIDOCAINE HYDROCHLORIDE 60 MG: 20 INJECTION, SOLUTION EPIDURAL; INFILTRATION; INTRACAUDAL at 07:33

## 2023-10-31 RX ADMIN — DEXAMETHASONE SODIUM PHOSPHATE 10 MG: 10 INJECTION INTRAMUSCULAR; INTRAVENOUS at 07:42

## 2023-10-31 RX ADMIN — PROPOFOL 200 MG: 10 INJECTION, EMULSION INTRAVENOUS at 07:33

## 2023-10-31 RX ADMIN — FENTANYL CITRATE 50 MCG: 50 INJECTION, SOLUTION INTRAMUSCULAR; INTRAVENOUS at 07:30

## 2023-10-31 RX ADMIN — MIDAZOLAM 2 MG: 1 INJECTION INTRAMUSCULAR; INTRAVENOUS at 07:30

## 2023-10-31 RX ADMIN — Medication 100 MCG: at 08:10

## 2023-10-31 RX ADMIN — FENTANYL CITRATE 25 MCG: 50 INJECTION, SOLUTION INTRAMUSCULAR; INTRAVENOUS at 08:14

## 2023-10-31 RX ADMIN — ONDANSETRON 4 MG: 2 INJECTION INTRAMUSCULAR; INTRAVENOUS at 08:14

## 2023-10-31 NOTE — DISCHARGE INSTR - AVS FIRST PAGE
POST-OPERATIVE CARE INSTRUCTIONS       Care after your procedure:   General  Rest and relax for 24 hours, then gradually return to normal activities. Do not preform any heavy lifting or strenuous physical activities for 14 days. Your activity restrictions will be re-evaluated at your post op visit. Drink clear liquids until you are certain there is no nausea, then resume a normal diet. Do not drink alcohol, drive any vehicle, operate mechanical equipment or make critical decisions for at least 24 hours and until you are off any narcotic pain medications. The Incision  Your incision is closed with:   dissolvable stiches just underneath the skin. The incision is also covered with:                          clear waterproof glue  A gauze-pad is covering the wound. Wound care  Remove your gauze-pad after 24 hours. You may then shower using soap and water to clean your incision. Gently dry the wound. You may redress your wound with additional gauze and tape if you choose. A little bruising at the wound site is normal.    Medication  Resume all previous medications  Take either Naproxen (Aleve) one tablet every 8 hours or Ibuprofen(Advil/Motrin) one(1) to two(2) tablets every 6 hours around the clock for the first 2-3 days. Take this even if you don't think you need it for at least the first 24 hours. Pain Medication Instructions: may also use over the counter tylenol          Other (If applicable)  Wear a post-surgical bra around the clock. May use ice to the incision site(s) for the next 24-48 hours, twice daily.    Call your  doctor if you have any of the following:  Redness, swelling, heat, drainage, and/or bleeding from your wound  Chills or fever ( above 101' F )  Pain, not relieved with the above medications  If you have any questions or problems call our office [phone]    Follow-up appointment:  As scheduled

## 2023-10-31 NOTE — INTERVAL H&P NOTE
H&P reviewed. After examining the patient I find no changes in the patients condition since the H&P had been written.     Vitals:    10/31/23 0547   BP: 140/88   Pulse: 82   Resp: 16   Temp: 98.3 °F (36.8 °C)   SpO2: 96%

## 2023-10-31 NOTE — ANESTHESIA POSTPROCEDURE EVALUATION
Post-Op Assessment Note    CV Status:  Stable    Pain management: adequate     Mental Status:  Alert and awake   Hydration Status:  Euvolemic   PONV Controlled:  Controlled   Airway Patency:  Patent      Post Op Vitals Reviewed: Yes      Staff: Anesthesiologist         No notable events documented.     BP      Temp      Pulse     Resp      SpO2      /76   Pulse 68   Temp 97.7 °F (36.5 °C)   Resp 12   Ht 5' 3" (1.6 m)   Wt 58.4 kg (128 lb 12 oz)   SpO2 95%   BMI 22.81 kg/m² \

## 2023-10-31 NOTE — OP NOTE
OPERATIVE REPORT  PATIENT NAME: Indigo Lovell    :  1965  MRN: [id number]  Pt Location: [facility] OR ROOM 05    SURGERY DATE: 10/31/2023    Surgeon(s) and Role:     * Keke Skelton MD - Primary     * Binu Estrada PA-C - Assisting    Preop Diagnosis:  Intraductal papilloma [D36.9]  Atypical ductal hyperplasia of left breast [N60.92]    Post-Op Diagnosis Codes:     * Intraductal papilloma [D36.9]     * Atypical ductal hyperplasia of left breast [N60.92]    Procedure(s):  Left - LUMPECTOMY BREAST LOU  LOCALIZED  Use of lou   Specimen radiograph    Specimen(s):  ID Type Source Tests Collected by Time Destination   1 : left lumpectomy, short suture superior, long suture lateral Tissue Soft Tissue, Other TISSUE EXAM Keke Skelton MD 10/31/2023 6586    2 : new lateral margin left breast, suture marks true margin Tissue Soft Tissue, Other TISSUE EXAM Keke Skelton MD 10/31/2023 9201    3 : new left breast posterior margin, suture marks true margin Tissue Soft Tissue, Other TISSUE EXAM Keke Skelton MD 10/31/2023 5740    4 : new left breast inferior margin, suture marks true margin Tissue Soft Tissue, Other TISSUE EXAM Keke Skelton MD 10/31/2023 3257        Estimated Blood Loss:   Minimal    Drains:  * No LDAs found *    Anesthesia Type:   General    Operative Indications:  Intraductal papilloma [D36.9]  Atypical ductal hyperplasia of left breast [N60.92]      Operative Findings:  Lou reflector and clip in specimen    Complications:   None    Procedure and Technique:  Brenda Kruse is a 69-year-old female who presents with a left breast intraductal papilloma with associated atypical duct hyperplasia. She was counseled on surgical resection. She presented the day of surgery to the operating room. She had preoperative antibiotics. She was administered general anesthesia. She was prepped and draped in the usual standard fashion. Timeout was performed.   Attention was turned to the upper outer left breast.  The savvy probe was used to identify the shortest distance to the reflector. The skin was marked in this location. Half percent Marcaine plain was injected for local anesthesia. An elliptical incision was created through the skin and subcutaneous tissue. Electrocautery was used to dissect down to the area of concern. This was elevated into the wound using an Allis clamp. A small margin of tissue was excised in all directions. The savvy probe was used throughout to help guide dissection. The specimen was marked with a short stitch superior and a long stitch lateral.  The specimen was interrogated with the Laine probe to confirm reflector removal.  It was also imaged in the operating room revealing Laine reflector with standard clip closest to the lateral, inferior and posterior margins. Therefore small margins were excised in these locations and sutures were placed on the true margins. All breast specimens were submitted to pathology in formalin. Her wound was irrigated and hemostasis was achieved. The wound was then closed in a layered fashion using multiple interrupted 3-0 Monocryl suture and a running 4-0 Monocryl subcuticular stitch. All counts were correct. The skin was cleaned and dried. Surgical glue, fluffs and a bra were applied. The patient was then extubated and taken to recovery in stable condition. A physician assistant was required during the procedure for retraction, tissue handling, dissection and suturing; no residents were available.     Patient Disposition:  extubated and stable    This procedure was not performed to treat breast cancer through sentinel node biopsy      SIGNATURE: Serafin Clancy MD  DATE: October 31, 2023  TIME: 8:27 AM

## 2023-10-31 NOTE — ANESTHESIA PREPROCEDURE EVALUATION
Procedure:  LUMPECTOMY BREAST PELON  LOCALIZED (Left: Breast)    Relevant Problems   CARDIO   (+) Hypertriglyceridemia                          (+) HTN    (+) GERD  Physical Exam    Airway    Mallampati score: II  TM Distance: >3 FB  Neck ROM: full     Dental       Cardiovascular  Rhythm: regular    Pulmonary   Breath sounds clear to auscultation    Other Findings        Anesthesia Plan  ASA Score- 2     Anesthesia Type- general with ASA Monitors. Additional Monitors:     Airway Plan:            Plan Factors-Exercise tolerance (METS): >4 METS. Chart reviewed. Existing labs reviewed. Patient is not a current smoker. Induction- intravenous. Postoperative Plan- Plan for postoperative opioid use. Planned trial extubation    Informed Consent- Anesthetic plan and risks discussed with patient.

## 2023-11-01 ENCOUNTER — ANCILLARY PROCEDURE (OUTPATIENT)
Dept: LAB | Facility: HOSPITAL | Age: 58
End: 2023-11-01
Attending: SURGERY

## 2023-11-06 ENCOUNTER — PATIENT MESSAGE (OUTPATIENT)
Dept: SURGICAL ONCOLOGY | Facility: CLINIC | Age: 58
End: 2023-11-06

## 2023-11-07 ENCOUNTER — DOCUMENTATION (OUTPATIENT)
Dept: HEMATOLOGY ONCOLOGY | Facility: CLINIC | Age: 58
End: 2023-11-07

## 2023-11-07 PROCEDURE — 88342 IMHCHEM/IMCYTCHM 1ST ANTB: CPT | Performed by: PATHOLOGY

## 2023-11-07 PROCEDURE — 88341 IMHCHEM/IMCYTCHM EA ADD ANTB: CPT | Performed by: PATHOLOGY

## 2023-11-07 PROCEDURE — 88307 TISSUE EXAM BY PATHOLOGIST: CPT | Performed by: PATHOLOGY

## 2023-11-07 NOTE — PROGRESS NOTES
Pt called me & asked about a letter she recvd from the ins co. I went over the info with her & she thanked me for the info

## 2023-11-07 NOTE — TELEPHONE ENCOUNTER
Called and spoke with Juliann Gonzalez regarding her questions about terminology of lumpectomy vs partial mastectomy. Provided contact information for Financial Counselor, Pranay Holloway to further clarify for her. She was appreciative.

## 2023-11-16 ENCOUNTER — OFFICE VISIT (OUTPATIENT)
Dept: SURGICAL ONCOLOGY | Facility: CLINIC | Age: 58
End: 2023-11-16

## 2023-11-16 ENCOUNTER — TELEPHONE (OUTPATIENT)
Dept: GENETICS | Facility: CLINIC | Age: 58
End: 2023-11-16

## 2023-11-16 VITALS
HEIGHT: 63 IN | DIASTOLIC BLOOD PRESSURE: 86 MMHG | WEIGHT: 130.2 LBS | OXYGEN SATURATION: 98 % | BODY MASS INDEX: 23.07 KG/M2 | TEMPERATURE: 98 F | SYSTOLIC BLOOD PRESSURE: 136 MMHG | HEART RATE: 79 BPM

## 2023-11-16 DIAGNOSIS — Z80.3 FAMILY HISTORY OF BREAST CANCER: ICD-10-CM

## 2023-11-16 DIAGNOSIS — E78.1 HYPERTRIGLYCERIDEMIA: Primary | ICD-10-CM

## 2023-11-16 DIAGNOSIS — Z80.0 FAMILY HISTORY OF PANCREATIC CANCER: ICD-10-CM

## 2023-11-16 DIAGNOSIS — D36.9 INTRADUCTAL PAPILLOMA: Primary | ICD-10-CM

## 2023-11-16 DIAGNOSIS — N60.92 ATYPICAL DUCTAL HYPERPLASIA OF LEFT BREAST: ICD-10-CM

## 2023-11-16 PROCEDURE — 99024 POSTOP FOLLOW-UP VISIT: CPT | Performed by: SURGERY

## 2023-11-16 NOTE — TELEPHONE ENCOUNTER
I called Margot Enciso to schedule a new patient appointment with the Cancer Risk and Genetics Program.      Outcome:   I left a voice message encouraging the patient to call the [facility] [place] at [phone] to schedule this appointment. A [employer] message was also send with our contact information. Patient has a family hx of cancer. Follow-up:   At this time the referral will be closed and we will wait to hear back from the patient regarding scheduling this appointment.

## 2023-11-16 NOTE — PROGRESS NOTES
60 y.o. female is here today s/p left lumpectomy for a papilloma with atypical ductal hyperplasia plasia. She reports no concerns. Physical Exam  Constitutional:       General: She is not in acute distress. Appearance: Normal appearance. Chest:   Breasts:     Left: Skin change (well healing incision, no signs of infection) present. Neurological:      Mental Status: She is alert and oriented to person, place, and time. Psychiatric:         Mood and Affect: Mood normal.         Data:   10/31/2023 left lumpectomy reveals an intraductal papilloma with atypical duct hyperplasia but no evidence of malignancy        Diagnoses and all orders for this visit:    Intraductal papilloma    Atypical ductal hyperplasia of left breast  -     MRI breast bilateral w and wo contrast w cad; Future    Family history of breast cancer  -     MRI breast bilateral w and wo contrast w cad; Future        Assessment/Plan: 65-year-old female status post left lumpectomy for a papilloma with atypical duct hyperplasia. Her final pathology reveals the same. She is healing well with no signs of infection. I discussed these findings with her. Additionally she has family history of both breast and pancreatic cancer. I placed a referral to oncology genetics. She would like to do this in the new year. I will reach out to them again. I recommended alternating mammography and breast MRI. I will make arrangements for an MRI for March. We will see her again in 6 months in our high-risk clinic or sooner should the need arise.

## 2023-11-17 ENCOUNTER — TELEPHONE (OUTPATIENT)
Dept: HEMATOLOGY ONCOLOGY | Facility: CLINIC | Age: 58
End: 2023-11-17

## 2023-11-17 NOTE — TELEPHONE ENCOUNTER
I spoke with Nahun Brandon to schedule their consultation with Cancer Risk and Genetics. Scheduling Outcome: Patient is scheduled for an appointment on 5/16/2024 at 9:30am with Joselo Archibald    Personal/Family History Related to Appointment:     Personal History of Cancer: Patient reports no personal history of cancer    Family History of Cancer: Patient reports family history of mother breast cancer twice, maternal aunt had breast cancer, maternal uncle pancreatic cancer    Is patient of [address] [place] [address], [address]?: No    History of Genetic Testing:  Personal History of Genetic Testing: Patient report no personal history of Genetic Testing. Family History of Genetic Testing: Patient reports that no family members have had Genetic Testing. Progeny:  Is patient able to complete our family history questionnaire on a computer?:  Yes    Patient's preferred e-mail address: [email or web address]

## 2023-11-21 ENCOUNTER — TELEPHONE (OUTPATIENT)
Dept: HEMATOLOGY ONCOLOGY | Facility: CLINIC | Age: 58
End: 2023-11-21

## 2023-11-21 NOTE — TELEPHONE ENCOUNTER
Patient Call    Who are you speaking with? Patient    If it is not the patient, are they listed on an active communication consent form? N/A   What is the reason for this call? Patient calling to see if there were any cancellations for Genetics so she could be seen sooner. I informed patient there are no sooner appointments and she is on the waitlist and the hopeline will reach out if there are any cancellations. Patient verbalized her understanding. Does this require a call back? No   If a call back is required, please list best call back number N/a   If a call back is required, advise that a message will be forwarded to their care team and someone will return their call as soon as possible. Did you relay this information to the patient?  No

## 2023-11-22 NOTE — H&P (VIEW-ONLY)
Breast Consultation-Surgical Oncology     [facility]  [facility] [id number]    Name:  Zina Montoya  YOB: 1965  MRN:  [id number]    Assessment/Plan   Diagnoses and all orders for this visit:    Atypical ductal hyperplasia of left breast    Family history of breast cancer  -     Ambulatory Referral to Oncology Genetics; Future    Intraductal papilloma    Family history of pancreatic cancer  -     Ambulatory Referral to Oncology Genetics; Future            HPI: Zina Montoya is a 60y.o. year old female who presents with abnormal imaging of the left breast.  She then went on to have a diagnostic mammogram, ultrasound and biopsy revealing an intraductal papilloma with atypical duct hyperplasia. She does have family history of breast and pancreatic cancer. She is interested in having genetic testing. Surgical treatment to date consisted of not applicable. Oncology History:    Oncology History    No history exists.        Pertinent reproductive history:  Age at menarche:    OB History          4    Para   4    Term   4            AB        Living             SAB        IAB        Ectopic        Multiple        Live Births   4                   Problem List:   Patient Active Problem List   Diagnosis    Tobacco use disorder    Seasonal allergic rhinitis    Hypertriglyceridemia    Family history of breast cancer    Atopic dermatitis and related condition    Chronic neck pain    Abnormal EKG    Dysphagia    Intraductal papilloma    Atypical ductal hyperplasia of left breast    Family history of pancreatic cancer     Past Medical History:   Diagnosis Date    Anxiety     Hypertension      Past Surgical History:   Procedure Laterality Date    AUGMENTATION MAMMAPLASTY Bilateral     saline    BREAST BIOPSY Left 2023    EGD  2021    Intrinsic moderate stenosis dilated with [address], erosive gastritis-biopsy negative for H. pylori by Dr. Monserrat Polo      age 45    [place] Left 9/26/2023     Family History   Problem Relation Age of Onset    Breast cancer Mother 59    No Known Problems Father     No Known Problems Daughter     No Known Problems Maternal Grandmother     No Known Problems Maternal Grandfather     No Known Problems Paternal Grandmother     No Known Problems Paternal Grandfather     Breast cancer Maternal Aunt 72     Social History     Socioeconomic History    Marital status: /Civil Union     Spouse name: Not on file    Number of children: Not on file    Years of education: Not on file    Highest education level: Not on file   Occupational History    Occupation: [employer]   Tobacco Use    Smoking status: Some Days     Types: Cigarettes    Smokeless tobacco: Never    Tobacco comments:     1 cig a day    Substance and Sexual Activity    Alcohol use:  Yes     Alcohol/week: 7.0 standard drinks of alcohol     Types: 7 Glasses of wine per week    Drug use: Never    Sexual activity: Not on file   Other Topics Concern    Not on file   Social History Narrative    Not on file     Social Determinants of Health     Financial Resource Strain: Not on file   Food Insecurity: Not on file   Transportation Needs: Not on file   Physical Activity: Not on file   Stress: Not on file   Social Connections: Not on file   Intimate Partner Violence: Not on file   Housing Stability: Not on file     Current Outpatient Medications   Medication Sig Dispense Refill    ALPRAZolam (XANAX) 0.25 mg tablet Take 0.25 mg by mouth Three times daily as needed      Cholecalciferol (VITAMIN D3 PO) Take by mouth in the morning      clonazePAM (KlonoPIN) 0.5 mg tablet Take 0.5 mg by mouth if needed      Cyanocobalamin (VITAMIN B-12 PO) Take by mouth in the morning      famotidine (PEPCID) 40 MG tablet Take 1 tablet (40 mg total) by mouth 2 (two) times a day 60 tablet 5    furosemide (LASIX) 20 mg tablet furosemide 20 mg tablet   TAKE ONE-HALF TABLET BY MOUTH DAILY AS NEEDED FOR LEG SWELLING      ibuprofen (MOTRIN) 600 mg tablet TAKE ONE TABLET BY MOUTH EVERY 8 HOURS AS NEEDED FOR MILD PAIN      lidocaine (XYLOCAINE) 2 % lidocaine HCl 20 mg/mL (2 %) injection solution   administered      meloxicam (MOBIC) 7.5 mg tablet meloxicam 7.5 mg tablet   TAKE ONE TABLET BY MOUTH EVERY DAY AS NEEDED      methylPREDNISolone acetate (DEPO-Medrol) 40 mg/mL injection administered      Miconazole-Zinc Oxide-Petrolat 0.25-15-81.35 % OINT if needed (for rash on lips)      Omega-3 Fatty Acids (FISH OIL PO) Take by mouth in the morning      Probiotic Product (PROBIOTIC PO) Take by mouth in the morning      Pyridoxine HCl (B-6 PO) Take by mouth      triamcinolone (KENALOG) 0.1 % cream APPLY TWICE DAILY TO AFFECTED AREAS AS DIRECTED      valACYclovir (VALTREX) 1,000 mg tablet if needed      valsartan (DIOVAN) 80 mg tablet Take 160 mg by mouth daily      omeprazole (PriLOSEC) 20 mg delayed release capsule Take 20 mg by mouth daily (Patient not taking: Reported on 2/16/2023)       No current facility-administered medications for this visit. Allergies   Allergen Reactions    Sulfa Antibiotics Hives    Amlodipine Other (See Comments)     Possible  ankle swelling on 5 mg    Lisinopril Other (See Comments)     dizziness    Methocarbamol Other (See Comments)     Nightmares    Other Sneezing     Seasonal allergies    Celebrex [Celecoxib] Tongue Swelling    Sulfamethoxazole-Trimethoprim Hives         The following portions of the patient's history were reviewed and updated as appropriate: allergies, current medications, past family history, past medical history, past social history, past surgical history, and problem list.    Review of Systems:  Review of Systems   Constitutional: Negative. Negative for appetite change, fever and unexpected weight change. HENT: Negative. Negative for trouble swallowing.     Eyes: Negative. Respiratory: Negative. Negative for cough and shortness of breath. Cardiovascular: Negative. Negative for chest pain. Gastrointestinal: Negative. Negative for abdominal pain, nausea and vomiting. Endocrine: Negative. Genitourinary: Negative. Negative for dysuria. Musculoskeletal: Negative. Negative for arthralgias and myalgias. Skin: Negative. Allergic/Immunologic: Positive for environmental allergies. Neurological: Negative. Negative for headaches. Hematological: Negative. Negative for adenopathy. Does not bruise/bleed easily. Psychiatric/Behavioral: Negative. Physical Exam:  Physical Exam  Vitals reviewed. Constitutional:       General: She is not in acute distress. Appearance: Normal appearance. She is well-developed. HENT:      Head: Normocephalic and atraumatic. Neck:      Thyroid: No thyromegaly. Vascular: No JVD. Trachea: No tracheal deviation. Cardiovascular:      Heart sounds: Normal heart sounds. Pulmonary:      Breath sounds: Normal breath sounds. Chest:   Breasts:     Breasts are symmetrical.      Right: No swelling, bleeding, inverted nipple, mass, nipple discharge, skin change or tenderness. Left: Skin change (resolving ecchymosis) present. No swelling, bleeding, inverted nipple, mass, nipple discharge or tenderness. Abdominal:      Palpations: Abdomen is soft. There is no hepatomegaly. Musculoskeletal:      Right lower leg: No edema. Left lower leg: No edema. Lymphadenopathy:      Upper Body:      Right upper body: No supraclavicular, axillary or pectoral adenopathy. Left upper body: No supraclavicular, axillary or pectoral adenopathy. Neurological:      Mental Status: She is alert and oriented to person, place, and time. Cranial Nerves: No cranial nerve deficit.    Psychiatric:         Mood and Affect: Mood normal.         Behavior: Behavior normal.     Laboratory:    9/26/2023 core biopsy left breast 2:00      Pathology revealed: Intraductal papilloma with atypical duct hyperplasia            Imagin2023 bilateral 3D screening mammogram was a BI-RADS 0 secondary to a mass in the 2:00 axis    2023 left 3D diagnostic mammogram and ultrasound shows the same with a 5 mm correlate seen on ultrasound for which biopsy was recommended as noted above    2023 postbiopsy mammogram is concordant, standard clip in place            Discussion/Summary: 54-year-old female who presents with abnormal imaging of the left breast.  Her biopsy revealed a papilloma with atypical ductal hyperplasia. She was counseled on PELON localized lumpectomy. She understands the rationale for this. We also discussed her family history of breast cancer in her mother and maternal aunt as well as pancreatic cancer in the maternal uncle. I am referring her to our genetic counselors as well. All of her questions were answered. Consent was signed today in the office. She will be scheduled for surgery in the near term. She needs to have her [facility] reflector placed. 23

## 2024-01-02 ENCOUNTER — TELEPHONE (OUTPATIENT)
Dept: HEMATOLOGY ONCOLOGY | Facility: CLINIC | Age: 59
End: 2024-01-02

## 2024-01-06 ENCOUNTER — OFFICE VISIT (OUTPATIENT)
Dept: URGENT CARE | Age: 59
End: 2024-01-06
Payer: COMMERCIAL

## 2024-01-06 VITALS
OXYGEN SATURATION: 96 % | RESPIRATION RATE: 18 BRPM | HEART RATE: 105 BPM | DIASTOLIC BLOOD PRESSURE: 83 MMHG | TEMPERATURE: 98.7 F | SYSTOLIC BLOOD PRESSURE: 136 MMHG | BODY MASS INDEX: 22.68 KG/M2 | HEIGHT: 63 IN | WEIGHT: 128 LBS

## 2024-01-06 DIAGNOSIS — B34.9 ACUTE VIRAL SYNDROME: Primary | ICD-10-CM

## 2024-01-06 DIAGNOSIS — R50.9 FEVER, UNSPECIFIED FEVER CAUSE: ICD-10-CM

## 2024-01-06 LAB
SARS-COV-2 AG UPPER RESP QL IA: NEGATIVE
VALID CONTROL: NORMAL

## 2024-01-06 PROCEDURE — 99213 OFFICE O/P EST LOW 20 MIN: CPT | Performed by: EMERGENCY MEDICINE

## 2024-01-06 PROCEDURE — 87811 SARS-COV-2 COVID19 W/OPTIC: CPT

## 2024-01-06 NOTE — PROGRESS NOTES
[facility]        NAME: Estella Laird is a 58 y.o. female  : 1965    MRN: [id number]  DATE: 2024  TIME: 8:43 AM    Assessment and Plan   Acute viral syndrome [B34.9]  1. Acute viral syndrome        2. Fever, unspecified fever cause  Poct Covid 19 Rapid Antigen Test        POCT rapid covid: Negative     Patient refused PCR covid/flu    Patient Instructions     Vitamin D3 2000 IU daily  Vitamin C 1000mg twice per day  Multivitamin daily  Fluids and rest  Over the counter cold medication as needed (EX: Mucinex, tylenol/motrin)  Follow up with PCP in 3-5 days.  Proceed to  ER if symptoms worsen.    Chief Complaint     Chief Complaint   Patient presents with    Fever     Fever began today, chill, body aches began yesterday         History of Present Illness       Patient is a 59 yo female with no significant PMH presenting in the clinic today for cold sx x 1 day. Admits fever, chills, body aches, fatigue, and headache. Denies cough, congestion, rhinorrhea, chest pain, SOB, abdominal pain, n/v/d. Admits the use of tylenol for sx management. Denies recent sick contacts.        Review of Systems   Review of Systems   Constitutional:  Positive for chills, fatigue and fever.   HENT:  Negative for congestion, ear pain, postnasal drip, rhinorrhea, sinus pressure, sinus pain and sore throat.    Respiratory:  Negative for cough and shortness of breath.    Cardiovascular:  Negative for chest pain.   Gastrointestinal:  Negative for abdominal pain, diarrhea, nausea and vomiting.   Musculoskeletal:  Positive for myalgias.   Skin:  Negative for rash.   Neurological:  Positive for headaches.         Current Medications       Current Outpatient Medications:     Cholecalciferol (VITAMIN D3 PO), Take by mouth in the morning, Disp: , Rfl:     clonazePAM (KlonoPIN) 0.5 mg tablet, Take 0.5 mg by mouth daily at bedtime, Disp: , Rfl:     Cyanocobalamin (VITAMIN B-12 PO), Take by mouth in the morning, Disp: , Rfl:      Omega-3 Fatty Acids (FISH OIL PO), Take by mouth in the morning, Disp: , Rfl:     Probiotic Product (PROBIOTIC PO), Take by mouth in the morning, Disp: , Rfl:     Pyridoxine HCl (B-6 PO), Take by mouth, Disp: , Rfl:     valACYclovir (VALTREX) 1,000 mg tablet, if needed, Disp: , Rfl:     valsartan (DIOVAN) 80 mg tablet, Take 160 mg by mouth daily, Disp: , Rfl:     ALPRAZolam (XANAX) 0.25 mg tablet, Take 0.25 mg by mouth Three times daily as needed (Patient not taking: Reported on 1/6/2024), Disp: , Rfl:     famotidine (PEPCID) 40 MG tablet, Take 1 tablet (40 mg total) by mouth 2 (two) times a day (Patient taking differently: Take 40 mg by mouth 2 (two) times a day as needed), Disp: 60 tablet, Rfl: 5    furosemide (LASIX) 20 mg tablet, furosemide 20 mg tablet  TAKE ONE-HALF TABLET BY MOUTH DAILY AS NEEDED FOR LEG SWELLING (Patient not taking: Reported on 1/6/2024), Disp: , Rfl:     ibuprofen (MOTRIN) 600 mg tablet, TAKE ONE TABLET BY MOUTH EVERY 8 HOURS AS NEEDED FOR MILD PAIN, Disp: , Rfl:     lidocaine (XYLOCAINE) 2 %, lidocaine HCl 20 mg/mL (2 %) injection solution  administered (Patient not taking: Reported on 10/20/2023), Disp: , Rfl:     meloxicam (MOBIC) 7.5 mg tablet, meloxicam 7.5 mg tablet  TAKE ONE TABLET BY MOUTH EVERY DAY AS NEEDED (Patient not taking: Reported on 10/20/2023), Disp: , Rfl:     methylPREDNISolone acetate (DEPO-Medrol) 40 mg/mL injection, administered (Patient not taking: Reported on 10/20/2023), Disp: , Rfl:     Miconazole-Zinc Oxide-Petrolat 0.25-15-81.35 % OINT, if needed (for rash on lips), Disp: , Rfl:     triamcinolone (KENALOG) 0.1 % cream, APPLY TWICE DAILY TO AFFECTED AREAS AS DIRECTED (Patient not taking: Reported on 1/6/2024), Disp: , Rfl:     Current Allergies     Allergies as of 01/06/2024 - Reviewed 01/06/2024   Allergen Reaction Noted    Celebrex [celecoxib] Tongue Swelling 04/17/2019    Sulfa antibiotics Hives and Tongue Swelling 04/17/2019    Amlodipine Other (See  "Comments) 11/13/2020    Lisinopril Other (See Comments) 10/26/2020    Methocarbamol Other (See Comments) 03/16/2017    Other Sneezing 02/05/2015    Sulfamethoxazole-trimethoprim Hives 02/05/2015            The following portions of the patient's history were reviewed and updated as appropriate: allergies, current medications, past family history, past medical history, past social history, past surgical history and problem list.     Past Medical History:   Diagnosis Date    Anxiety     Breast lump in female     left-  lumpectomy today 10/31/2023    Hypertension        Past Surgical History:   Procedure Laterality Date    AUGMENTATION MAMMAPLASTY Bilateral 2000    saline    BREAST BIOPSY Left 09/26/2023    BREAST LUMPECTOMY Left 10/31/2023    Procedure: LUMPECTOMY BREAST PELON  LOCALIZED;  Surgeon: Magalie Hubbard MD;  Location: [facility];  Service: Surgical Oncology    COLONOSCOPY      EGD  09/30/2021    Intrinsic moderate stenosis dilated with 54 [place] Littlejohn, erosive gastritis-biopsy negative for H. pylori by Dr. Michele    HYSTERECTOMY      age 51    US BREAST NEEDLE LOC LEFT Left 10/27/2023    US GUIDED BREAST BIOPSY LEFT COMPLETE Left 09/26/2023       Family History   Problem Relation Age of Onset    Breast cancer Mother 63        again at 71 years old.    No Known Problems Father     No Known Problems Daughter     Breast cancer Maternal Aunt 65    Pancreatic cancer Maternal Uncle         80's    No Known Problems Maternal Grandmother     No Known Problems Maternal Grandfather     No Known Problems Paternal Grandmother     No Known Problems Paternal Grandfather          Medications have been verified.        Objective   /83   Pulse 105   Temp 98.7 °F (37.1 °C)   Resp 18   Ht 5' 3\" (1.6 m)   Wt 58.1 kg (128 lb)   SpO2 96%   BMI 22.67 kg/m²        Physical Exam     Physical Exam  Vitals reviewed.   Constitutional:       General: She is not in acute distress.     Appearance: Normal appearance. She is " normal weight. She is ill-appearing (mildly).   HENT:      Head: Normocephalic.      Right Ear: Hearing, tympanic membrane, ear canal and external ear normal. No middle ear effusion. There is no impacted cerumen. Tympanic membrane is not erythematous or bulging.      Left Ear: Hearing, tympanic membrane, ear canal and external ear normal.  No middle ear effusion. There is no impacted cerumen. Tympanic membrane is not erythematous or bulging.      Nose: Nose normal. No congestion or rhinorrhea.      Right Sinus: No maxillary sinus tenderness or frontal sinus tenderness.      Left Sinus: No maxillary sinus tenderness or frontal sinus tenderness.      Mouth/Throat:      Lips: Pink.      Mouth: Mucous membranes are moist.      Pharynx: Oropharynx is clear. Uvula midline. No pharyngeal swelling, oropharyngeal exudate, posterior oropharyngeal erythema or uvula swelling.      Tonsils: No tonsillar exudate or tonsillar abscesses. 1+ on the right. 1+ on the left.   Eyes:      General:         Right eye: No discharge.         Left eye: No discharge.      Conjunctiva/sclera: Conjunctivae normal.   Cardiovascular:      Rate and Rhythm: Normal rate and regular rhythm.      Pulses: Normal pulses.      Heart sounds: Normal heart sounds. No murmur heard.     No friction rub. No gallop.   Pulmonary:      Effort: Pulmonary effort is normal.      Breath sounds: Normal breath sounds. No wheezing, rhonchi or rales.   Musculoskeletal:      Cervical back: Normal range of motion and neck supple. No tenderness.   Lymphadenopathy:      Cervical: No cervical adenopathy.   Skin:     General: Skin is warm.      Findings: No rash.   Neurological:      Mental Status: She is alert.   Psychiatric:         Mood and Affect: Mood normal.         Behavior: Behavior normal.

## 2024-01-06 NOTE — PATIENT INSTRUCTIONS
Vitamin D3 2000 IU daily  Vitamin C 1000mg twice per day  Multivitamin daily  Fluids and rest  Over the counter cold medication as needed (EX: Mucinex, tylenol/motrin)  Follow up with PCP in 3-5 days.  Proceed to ER if symptoms worsen.

## 2024-01-29 ENCOUNTER — APPOINTMENT (EMERGENCY)
Dept: RADIOLOGY | Facility: HOSPITAL | Age: 59
End: 2024-01-29
Payer: COMMERCIAL

## 2024-01-29 ENCOUNTER — HOSPITAL ENCOUNTER (OUTPATIENT)
Facility: HOSPITAL | Age: 59
Setting detail: OBSERVATION
Discharge: HOME | End: 2024-01-30
Attending: EMERGENCY MEDICINE | Admitting: INTERNAL MEDICINE
Payer: COMMERCIAL

## 2024-01-29 DIAGNOSIS — I48.91 ATRIAL FIBRILLATION (HCC): Primary | ICD-10-CM

## 2024-01-29 PROBLEM — I10 HYPERTENSION: Status: ACTIVE | Noted: 2024-01-29

## 2024-01-29 LAB
2HR DELTA HS TROPONIN: 15 NG/L
4HR DELTA HS TROPONIN: 27 NG/L
ALBUMIN SERPL BCP-MCNC: 4.5 G/DL (ref 3.5–5)
ALP SERPL-CCNC: 59 U/L (ref 34–104)
ALT SERPL W P-5'-P-CCNC: 13 U/L (ref 7–52)
ANION GAP SERPL CALCULATED.3IONS-SCNC: 8 MMOL/L
AST SERPL W P-5'-P-CCNC: 21 U/L (ref 13–39)
BASOPHILS # BLD AUTO: 0.09 THOUSANDS/ÂΜL (ref 0–0.1)
BASOPHILS NFR BLD AUTO: 1 % (ref 0–1)
BILIRUB SERPL-MCNC: 0.52 MG/DL (ref 0.2–1)
BNP SERPL-MCNC: 22 PG/ML (ref 0–100)
BUN SERPL-MCNC: 20 MG/DL (ref 5–25)
CALCIUM SERPL-MCNC: 9.5 MG/DL (ref 8.4–10.2)
CARDIAC TROPONIN I PNL SERPL HS: 10 NG/L
CARDIAC TROPONIN I PNL SERPL HS: 25 NG/L
CARDIAC TROPONIN I PNL SERPL HS: 37 NG/L
CHLORIDE SERPL-SCNC: 106 MMOL/L (ref 96–108)
CO2 SERPL-SCNC: 27 MMOL/L (ref 21–32)
CREAT SERPL-MCNC: 0.83 MG/DL (ref 0.6–1.3)
EOSINOPHIL # BLD AUTO: 0.59 THOUSAND/ÂΜL (ref 0–0.61)
EOSINOPHIL NFR BLD AUTO: 5 % (ref 0–6)
ERYTHROCYTE [DISTWIDTH] IN BLOOD BY AUTOMATED COUNT: 12.5 % (ref 11.6–15.1)
GFR SERPL CREATININE-BSD FRML MDRD: 77 ML/MIN/1.73SQ M
GLUCOSE SERPL-MCNC: 109 MG/DL (ref 65–140)
HCT VFR BLD AUTO: 47.8 % (ref 34.8–46.1)
HGB BLD-MCNC: 15.9 G/DL (ref 11.5–15.4)
IMM GRANULOCYTES # BLD AUTO: 0.03 THOUSAND/UL (ref 0–0.2)
IMM GRANULOCYTES NFR BLD AUTO: 0 % (ref 0–2)
LYMPHOCYTES # BLD AUTO: 3.02 THOUSANDS/ÂΜL (ref 0.6–4.47)
LYMPHOCYTES NFR BLD AUTO: 26 % (ref 14–44)
MAGNESIUM SERPL-MCNC: 2.2 MG/DL (ref 1.9–2.7)
MCH RBC QN AUTO: 31.2 PG (ref 26.8–34.3)
MCHC RBC AUTO-ENTMCNC: 33.3 G/DL (ref 31.4–37.4)
MCV RBC AUTO: 94 FL (ref 82–98)
MONOCYTES # BLD AUTO: 1.01 THOUSAND/ÂΜL (ref 0.17–1.22)
MONOCYTES NFR BLD AUTO: 9 % (ref 4–12)
NEUTROPHILS # BLD AUTO: 6.72 THOUSANDS/ÂΜL (ref 1.85–7.62)
NEUTS SEG NFR BLD AUTO: 59 % (ref 43–75)
NRBC BLD AUTO-RTO: 0 /100 WBCS
PLATELET # BLD AUTO: 488 THOUSANDS/UL (ref 149–390)
PMV BLD AUTO: 10.1 FL (ref 8.9–12.7)
POTASSIUM SERPL-SCNC: 4.1 MMOL/L (ref 3.5–5.3)
PROT SERPL-MCNC: 7.1 G/DL (ref 6.4–8.4)
RBC # BLD AUTO: 5.1 MILLION/UL (ref 3.81–5.12)
SODIUM SERPL-SCNC: 141 MMOL/L (ref 135–147)
T4 FREE SERPL-MCNC: 0.79 NG/DL (ref 0.61–1.12)
TSH SERPL DL<=0.05 MIU/L-ACNC: 2.67 UIU/ML (ref 0.45–4.5)
WBC # BLD AUTO: 11.46 THOUSAND/UL (ref 4.31–10.16)

## 2024-01-29 PROCEDURE — 83880 ASSAY OF NATRIURETIC PEPTIDE: CPT

## 2024-01-29 PROCEDURE — 93005 ELECTROCARDIOGRAM TRACING: CPT

## 2024-01-29 PROCEDURE — 84439 ASSAY OF FREE THYROXINE: CPT | Performed by: INTERNAL MEDICINE

## 2024-01-29 PROCEDURE — 84484 ASSAY OF TROPONIN QUANT: CPT | Performed by: INTERNAL MEDICINE

## 2024-01-29 PROCEDURE — 83735 ASSAY OF MAGNESIUM: CPT

## 2024-01-29 PROCEDURE — 99285 EMERGENCY DEPT VISIT HI MDM: CPT

## 2024-01-29 PROCEDURE — 96376 TX/PRO/DX INJ SAME DRUG ADON: CPT

## 2024-01-29 PROCEDURE — 85025 COMPLETE CBC W/AUTO DIFF WBC: CPT

## 2024-01-29 PROCEDURE — 36415 COLL VENOUS BLD VENIPUNCTURE: CPT

## 2024-01-29 PROCEDURE — 71045 X-RAY EXAM CHEST 1 VIEW: CPT

## 2024-01-29 PROCEDURE — 99223 1ST HOSP IP/OBS HIGH 75: CPT | Performed by: INTERNAL MEDICINE

## 2024-01-29 PROCEDURE — 99204 OFFICE O/P NEW MOD 45 MIN: CPT | Performed by: STUDENT IN AN ORGANIZED HEALTH CARE EDUCATION/TRAINING PROGRAM

## 2024-01-29 PROCEDURE — 84443 ASSAY THYROID STIM HORMONE: CPT

## 2024-01-29 PROCEDURE — 80053 COMPREHEN METABOLIC PANEL: CPT

## 2024-01-29 PROCEDURE — 99291 CRITICAL CARE FIRST HOUR: CPT | Performed by: EMERGENCY MEDICINE

## 2024-01-29 PROCEDURE — 84484 ASSAY OF TROPONIN QUANT: CPT

## 2024-01-29 PROCEDURE — 96365 THER/PROPH/DIAG IV INF INIT: CPT

## 2024-01-29 RX ORDER — ALPRAZOLAM 0.25 MG/1
0.25 TABLET ORAL ONCE
Status: COMPLETED | OUTPATIENT
Start: 2024-01-29 | End: 2024-01-29

## 2024-01-29 RX ORDER — SODIUM CHLORIDE 9 MG/ML
3 INJECTION INTRAVENOUS
Status: DISCONTINUED | OUTPATIENT
Start: 2024-01-29 | End: 2024-01-30 | Stop reason: HOSPADM

## 2024-01-29 RX ORDER — DILTIAZEM HYDROCHLORIDE 5 MG/ML
15 INJECTION INTRAVENOUS ONCE
Status: COMPLETED | OUTPATIENT
Start: 2024-01-29 | End: 2024-01-29

## 2024-01-29 RX ORDER — VALSARTAN 80 MG/1
160 TABLET ORAL DAILY
Status: DISCONTINUED | OUTPATIENT
Start: 2024-01-30 | End: 2024-01-30

## 2024-01-29 RX ADMIN — DILTIAZEM HYDROCHLORIDE 2.5 MG/HR: 5 INJECTION INTRAVENOUS at 13:36

## 2024-01-29 RX ADMIN — ALPRAZOLAM 0.25 MG: 0.25 TABLET ORAL at 23:42

## 2024-01-29 RX ADMIN — DILTIAZEM HYDROCHLORIDE 30 MG: 30 TABLET ORAL at 23:42

## 2024-01-29 RX ADMIN — APIXABAN 5 MG: 5 TABLET, FILM COATED ORAL at 15:19

## 2024-01-29 RX ADMIN — DILTIAZEM HYDROCHLORIDE 15 MG: 5 INJECTION INTRAVENOUS at 13:35

## 2024-01-29 RX ADMIN — METOPROLOL TARTRATE 25 MG: 25 TABLET, FILM COATED ORAL at 17:05

## 2024-01-29 NOTE — ASSESSMENT & PLAN NOTE
Resenting with atrial fibrillation with rapid ventricular response  Did take Sudafed for upper respiratory infection -possible etiology of her A-fib  Denies any excessive caffeine consumption  Started on diltiazem drip, continue for now  Check TSH and free T4  Echocardiogram pending  Cardiology consult by the emergency room  [id number]: 2  Initiated on Eliquis

## 2024-01-29 NOTE — PLAN OF CARE

## 2024-01-29 NOTE — H&P
[facility]  H&P  Name: Estella Laird 58 y.o. female I MRN: [id number]  Unit/Bed#: ED-15 I Date of Admission: 1/29/2024   Date of Service: 1/29/2024 I Hospital Day: 0        Assessment and Plan  * Atrial fibrillation (HCC)  Assessment & Plan  Resenting with atrial fibrillation with rapid ventricular response  Did take Sudafed for upper respiratory infection -possible etiology of her A-fib  Denies any excessive caffeine consumption  Started on diltiazem drip, continue for now  Check TSH and free T4  Echocardiogram pending  Cardiology consult by the emergency room    Hypertension  Assessment & Plan  Prior to admission on valsartan which patient would like to continue  Monitor with rate control agent, may need to adjust    Intraductal papilloma  Assessment & Plan  History of breast cancer, not on any active treatment        Code Status: Full code    VTE Prophylaxis: Apixaban (Eliquis)  / sequential compression device     Discussion with family: Patient family member at bedside    Anticipated Length of Stay:  Patient will be admitted on an Observation basis with an anticipated length of stay of greater than 2 midnights.   Justification for Hospital Stay: Atrial fibrillation (HCC)     Total Time for Visit, including Counseling / Coordination of Care: 76 minutes.  Greater than 50% of this total time spent on direct patient counseling and coordination of care.    Chief Complaint:     Chief Complaint   Patient presents with    Palpitations     Pt started with palpitations at approx 1100. Denies cp. Hx of anxiety, last panic attack approx 20 years ago, took half a xanax at approx 1130 without relief. Stressors at home. Denies cp. Also complaining of dizziness.        History of Present Illness:    Estella Laird is a 58 y.o. female who presents with heart palpitations, patient thought she was having a panic attack and took a Xanax.  Her past medical history is notable for hypertension for which he is on  valsartan.  In the emergency room she was noted to be in atrial fibrillation with rapid ventricular response.  Her only recent medication change has been she took Sudafed for an upper respiratory infection.  She denies any nausea or vomiting.  No diarrhea.  No history of thyroid issues.  She does have a history of breast cancer status postlumpectomy, not currently on chemotherapy..    Review of Systems:    A complete and comprehensive 14 point organ system review was performed and all other systems are negative other than stated above in the HPI    Past Medical and Surgical History:     Past Medical History:   Diagnosis Date    Anxiety     Breast lump in female     left-  lumpectomy today 10/31/2023    Hypertension        Past Surgical History:   Procedure Laterality Date    AUGMENTATION MAMMAPLASTY Bilateral 2000    saline    BREAST BIOPSY Left 09/26/2023    BREAST LUMPECTOMY Left 10/31/2023    Procedure: LUMPECTOMY BREAST PELON  LOCALIZED;  Surgeon: Magalie Hubbard MD;  Location: [facility];  Service: Surgical Oncology    COLONOSCOPY      EGD  09/30/2021    Intrinsic moderate stenosis dilated with 54 [place] Littlejohn, erosive gastritis-biopsy negative for H. pylori by Dr. Michele    HYSTERECTOMY      age 51    US BREAST NEEDLE LOC LEFT Left 10/27/2023    US GUIDED BREAST BIOPSY LEFT COMPLETE Left 09/26/2023       Meds/Allergies:    Prior to Admission medications    Medication Sig Start Date End Date Taking? Authorizing Provider   ALPRAZolam (XANAX) 0.25 mg tablet Take 0.25 mg by mouth Three times daily as needed 9/27/23 3/25/24 Yes Historical Provider, MD   Cholecalciferol (VITAMIN D3 PO) Take by mouth in the morning   Yes Historical Provider, MD   clonazePAM (KlonoPIN) 0.5 mg tablet Take 0.5 mg by mouth daily at bedtime   Yes Historical Provider, MD   Cyanocobalamin (VITAMIN B-12 PO) Take by mouth in the morning   Yes Historical Provider, MD   famotidine (PEPCID) 40 MG tablet Take 1 tablet (40 mg total) by mouth 2 (two)  times a day  Patient taking differently: Take 40 mg by mouth 2 (two) times a day as needed 2/16/23 1/29/24 Yes GARTH Gaitan   valsartan (DIOVAN) 80 mg tablet Take 160 mg by mouth daily   Yes Historical Provider, MD   ibuprofen (MOTRIN) 600 mg tablet TAKE ONE TABLET BY MOUTH EVERY 8 HOURS AS NEEDED FOR MILD PAIN    Historical Provider, MD   lidocaine (XYLOCAINE) 2 % lidocaine HCl 20 mg/mL (2 %) injection solution   administered  Patient not taking: Reported on 10/20/2023    Historical Provider, MD   meloxicam (MOBIC) 7.5 mg tablet meloxicam 7.5 mg tablet   TAKE ONE TABLET BY MOUTH EVERY DAY AS NEEDED  Patient not taking: Reported on 10/20/2023    Historical Provider, MD   methylPREDNISolone acetate (DEPO-Medrol) 40 mg/mL injection administered  Patient not taking: Reported on 10/20/2023 8/10/23   Historical Provider, MD   Miconazole-Zinc Oxide-Petrolat 0.25-15-81.35 % OINT if needed (for rash on lips)    Historical Provider, MD   Omega-3 Fatty Acids (FISH OIL PO) Take by mouth in the morning    Historical Provider, MD   Probiotic Product (PROBIOTIC PO) Take by mouth in the morning    Historical Provider, MD   Pyridoxine HCl (B-6 PO) Take by mouth    Historical Provider, MD   triamcinolone (KENALOG) 0.1 % cream APPLY TWICE DAILY TO AFFECTED AREAS AS DIRECTED  Patient not taking: Reported on 1/6/2024    Historical Provider, MD   valACYclovir (VALTREX) 1,000 mg tablet if needed    Historical Provider, MD   furosemide (LASIX) 20 mg tablet furosemide 20 mg tablet   TAKE ONE-HALF TABLET BY MOUTH DAILY AS NEEDED FOR LEG SWELLING  Patient not taking: Reported on 1/6/2024 1/29/24  Historical Provider, MD     I have reviewed home medications with patient personally.    Allergies:   Allergies   Allergen Reactions    Celebrex [Celecoxib] Tongue Swelling    Sulfa Antibiotics Hives and Tongue Swelling    Amlodipine Other (See Comments)     Possible  ankle swelling on 5 mg    Lisinopril Other (See Comments)      dizziness    Methocarbamol Other (See Comments)     Nightmares    Other Sneezing     Seasonal allergies    Sulfamethoxazole-Trimethoprim Hives       Social History:     Marital Status: /Civil Union   Occupation: [employer]    Substance Use History:   Social History     Substance and Sexual Activity   Alcohol Use Yes    Alcohol/week: 7.0 standard drinks of alcohol    Types: 7 Glasses of wine per week    Comment: DAILY  1 GLASS WINE     Social History     Tobacco Use   Smoking Status Former    Current packs/day: 0.00    Average packs/day: 0.3 packs/day for 20.0 years (5.0 ttl pk-yrs)    Types: Cigarettes    Start date: 2003    Quit date: 2023    Years since quittin.3   Smokeless Tobacco Never   Tobacco Comments    1 cig a day 10/2023 Quit     Social History     Substance and Sexual Activity   Drug Use Never       Family History:    Family History   Problem Relation Age of Onset    Breast cancer Mother 63        again at 71 years old.    No Known Problems Father     No Known Problems Daughter     Breast cancer Maternal Aunt 65    Pancreatic cancer Maternal Uncle         80's    No Known Problems Maternal Grandmother     No Known Problems Maternal Grandfather     No Known Problems Paternal Grandmother     No Known Problems Paternal Grandfather        Physical Exam:     Vitals:   Blood Pressure: 109/84 (24 1515)  Pulse: (!) 142 (24 1515)  Temperature: 97.5 °F (36.4 °C) (24 1311)  Temp Source: Oral (24 1311)  Respirations: 14 (24 1515)  Weight - Scale: 59.4 kg (130 lb 15.3 oz) (24 1307)  SpO2: 98 % (24 1515)      General: well appearing, no acute distress  HEENT: atraumatic, PERRLA, moist mucosa, normal pharynx, normal tonsils and adenoids, normal tongue, no fluid in sinuses  Neck: Trachea midline, no carotid bruit, no masses  Respiratory: normal chest wall expansion, CTA B, no r/r/w, no rubs  Cardiovascular: RRR, no m/r/g, Normal S1 and S2  Abdomen: Soft,  non-tender, non-distended, normal bowel sounds in all quadrants, no hepatosplenomegaly, no tympany  Rectal: deferred  Musculoskeletal: normal ROM in upper and lower extremities  Integumentary: warm, dry, and pink, with no rash, purpura, or petechia  Heme/Lymph: no lymphadenopathy, no bruises  Neurological: Cranial Nerves II-XII grossly intact  Psychiatric: cooperative with normal mood, affect, and cognition    Additional Data:     Lab Results: Laboratory studies reviewed for today    Results from last 7 days   Lab Units 01/29/24  1320   WBC Thousand/uL 11.46*   HEMOGLOBIN g/dL 15.9*   HEMATOCRIT % 47.8*   PLATELETS Thousands/uL 488*   NEUTROS PCT % 59   LYMPHS PCT % 26   MONOS PCT % 9   EOS PCT % 5     Results from last 7 days   Lab Units 01/29/24  1320   SODIUM mmol/L 141   POTASSIUM mmol/L 4.1   CHLORIDE mmol/L 106   CO2 mmol/L 27   BUN mg/dL 20   CREATININE mg/dL 0.83   ANION GAP mmol/L 8   CALCIUM mg/dL 9.5   ALBUMIN g/dL 4.5   TOTAL BILIRUBIN mg/dL 0.52   ALK PHOS U/L 59   ALT U/L 13   AST U/L 21   GLUCOSE RANDOM mg/dL 109                     Results from last 7 days   Lab Units 01/29/24  1320   HS TNI 0HR ng/L 10       Imaging: I have personally reviewed pertinent reports.      X-ray chest 1 view portable    (Results Pending)       EKG, Pathology, and Other Studies Reviewed on Admission:   EKG: Atrial fibrillation at a rate 183, no acute ST segment elevations or depressions    Allscripts / Epic Records Reviewed: Yes    ** Please Note: This note was completed in part utilizing [employer] Dragon Medical One Software.  Grammatical errors, random word insertions, spelling mistakes, and incomplete sentences may be an occasional consequence of this system secondary to software limitations, ambient noise, and hardware issues.  If you have any questions or concerns about the content, text, or information contained within the body of this dictation, please contact the provider for clarification.**

## 2024-01-29 NOTE — ASSESSMENT & PLAN NOTE
Prior to admission on valsartan which patient would like to continue  Monitor with rate control agent, may need to adjust

## 2024-01-29 NOTE — ED ATTENDING ATTESTATION
1/29/2024  I, Shady Ospina MD, saw and evaluated the patient. I have discussed the patient with the resident/non-physician practitioner and agree with the resident's/non-physician practitioner's findings, Plan of Care, and MDM as documented in the resident's/non-physician practitioner's note, except where noted. All available labs and Radiology studies were reviewed.  I was present for key portions of any procedure(s) performed by the resident/non-physician practitioner and I was immediately available to provide assistance.       At this point I agree with the current assessment done in the Emergency Department.  I have conducted an independent evaluation of this patient a history and physical is as follows:    Sudden onset of rapid heart rate about 11 AM.  Patient is in rapid atrial fibrillation.  Does admit to drinking alcohol daily.  She states about 1 drink per day.  No chest pain or shortness of breath.  She is not dizzy or lightheaded.  She is irregularly irregular.  Anticoagulation.  Per discussion with cardiology will admit on a Cardizem drip to [facility] and perhaps they will be doing an elective cardioversion tomorrow.    ED Course         Critical Care Time  CriticalCare Time    Date/Time: 1/29/2024 3:15 PM    Performed by: Shady Ospina MD  Authorized by: Shady Ospina MD    Critical care provider statement:     Critical care time (minutes):  30    Critical care time was exclusive of:  Separately billable procedures and treating other patients and teaching time    Critical care was necessary to treat or prevent imminent or life-threatening deterioration of the following conditions: Rapid afib, cardizem gtt, personal discussion with cardiology regarding cardioversion vs meds and admit, admission to [facility].    Critical care was time spent personally by me on the following activities:  Obtaining history from patient or surrogate, development of treatment plan with patient or  surrogate, discussions with consultants, evaluation of patient's response to treatment, examination of patient, re-evaluation of patient's condition, ordering and review of radiographic studies and ordering and review of laboratory studies

## 2024-01-29 NOTE — CONSULTS
Consultation Note - Cardiology   Estella Laird 58 y.o. female MRN: [id number]  Unit/Bed#: ED-15 Encounter: [id number]  01/29/24  1:55 PM    Encounter date: 1/29/2024  Patient name: Estella Laird 58 y.o. female  Primary care provider: Myrna Rodriguez DO  Date of admission: 1/29/2024  Medical student: Lucina Valladares MS4  Cardiology attending: Jayden Capps MD         Assessment/ Plan:  Atrial Fibrillation with RVR, new onset:  -Presented in a fib with RVR in the 180s. Received 15 mg cardizem in the ER and started on cardizem gtt currently at 15 ml/hr. Heart rate now in the 130-140s. Magnesium, TSH, BNP within normal range. No other prior episodes and no personal history of cardiac disease. [id number] score of 2 (female sex and HTN). Denies history of sleep apnea. Previous use of sudafed for the past two weeks possibly related to new onset.    Plan:  Continue cardizem gtt  Plan for cardioversion with MARIAH tomorrow if she remains in a fib  NPO after midnight   Will start metoprolol tartrate 25 mg q8h tonight to decrease HR further until cardioversion  Was understanding and willing to take this medication despite previous side effect of night terrors  Given [id number] score of 2 (female, hypertension), started on Eliquis 5 mg twice daily  Echo ordered    Hypertriglyceridemia   -History of high triglycerides (latest lipid panel in 6/2023 showing triglyceride value of 318). Cholesterol, LDL, HDL within normal range    Plan:  Counseled patient on diet modification and incorporating healthy lifestyle changes    Hypertension  - History of HTN for which she previously took lisinopril (lightheadedness side effect), amlodipine (leg edema side effect), and metoprolol (night terror side effect). At home prior to coming to ED she was on valsartan 160 mg QD. Systolic pressures range from 100-130s; diastolic pressure range from 67-95 since being here in hospital     Plan:   Holding home valsartan to give more room for blood pressure  control    4.   History of Breast cancer  -She is s/p left lumpectomy for a papilloma with atypical ductal hyperplasia. No concerns noted at most recent surgical oncology appointment. Is not currently on any chemotherapy.     Plan:  Follow-up with surgical oncology and future MRIs scheduled    5.   Anxiety  -History of anxiety and panic attacks for which she is prescribed xanax 0.25 mg prn and clonazepam 0.5 mg QD at bedtime. Reports recent major stressors in her life.     Plan:  Holding home meds currently, will discuss about possible outpatient therapy. Daughter and  at bedside today supporting her    6.   History of dysphagia  -Longstanding history of dysphagia, most recent EGD performed in 05/2023 this past year showed no endoscopic abnormality to explain her dysphagia. They did proceed with dilating entire esophagus. Today she reports no dysphagia or any concerns swallowing food. No concern at this time prior to MARIAH and probe use. Counseled patient on risks associated with MARIAH.     7.   Alcohol use   - Reports about 2 glasses of wine each night with dinner.    Plan:  Counseled patient on reducing alcohol intake as it can be a triggering factor for the development of atrial fibrillation       Summary:  - Continue with Cardizem drip  - Started on Lopressor 25 mg every 8 hours to better control heart rates  - Continue with Eliquis 5 mg twice daily for anticoagulation  - Keep n.p.o. after midnight for possible MARIAH cardioversion tomorrow if patient remains in atrial fibrillation      HPI: Estella Laird is a 58 y.o. year old female with history of HTN, atypical ductal hyperplasia of left breast and intraductal papilloma s/p left lumpectomy, dysphagia, anxiety, and hypertriglyceridemia who presents with palpitations, lightheadedness, diaphoresis, and shortness of breath.   She was found to be in a fib with RVR in the 180's upon arrival to the ED. She has never had an episode of a fib in the past. She notes that  these symptoms came on suddenly after returning home from going out to breakfast this morning around 1100. She initially thought it was a panic attack but the symptoms did not resolve after taking her prescribed xanax. She reports recent major stressors in her life, especially after this weekend with her son going to care home. She and her  recently had COVID in the beginning of January. Most of her symptoms have resolved except for nasal congestion which notably, she has taken Sudafed every morning for the past two weeks. She has no prior cardiac history except for the fact that she had a stress echo performed in 2018 which she notes was ordered after she had chest pain from eating a muffin. The stress echo was negative for any ischemia. She used to smoke one cigarette for the past 35 years but successfully quit this past fall. She drinks about two glasses of wine per day and does not use any drugs. She works full-time as a hairdresser. She currently denies palpitations, SOB, lightheadedness, chest pain, nausea, vomiting, headaches, and abdominal pain.      Review of Systems   Constitutional:  Negative for diaphoresis, fatigue and fever.   Eyes:  Negative for visual disturbance.   Respiratory:  Negative for cough and shortness of breath.    Cardiovascular:  Negative for chest pain, palpitations and leg swelling.   Gastrointestinal:  Negative for abdominal pain, nausea and vomiting.   Endocrine: Positive for heat intolerance.        Usually at night, attributes to menopause   Neurological:  Negative for dizziness and headaches.   Psychiatric/Behavioral:  The patient is nervous/anxious.        Historical Information   Past Medical History:   Diagnosis Date    Anxiety     Breast lump in female     left-  lumpectomy today 10/31/2023    Hypertension      Past Surgical History:   Procedure Laterality Date    AUGMENTATION MAMMAPLASTY Bilateral 2000    saline    BREAST BIOPSY Left 09/26/2023    BREAST LUMPECTOMY Left  10/31/2023    Procedure: LUMPECTOMY BREAST PELON  LOCALIZED;  Surgeon: Magalie Hubbard MD;  Location: [facility];  Service: Surgical Oncology    COLONOSCOPY      EGD  2021    Intrinsic moderate stenosis dilated with 53 [address], erosive gastritis-biopsy negative for H. pylori by Dr. Michele    HYSTERECTOMY      age 51    US BREAST NEEDLE LOC LEFT Left 10/27/2023    US GUIDED BREAST BIOPSY LEFT COMPLETE Left 2023     Social History     Substance and Sexual Activity   Alcohol Use Yes    Alcohol/week: 7.0 standard drinks of alcohol    Types: 7 Glasses of wine per week    Comment: DAILY  1 GLASS WINE     Social History     Substance and Sexual Activity   Drug Use Never     Social History     Tobacco Use   Smoking Status Former    Current packs/day: 0.00    Average packs/day: 0.3 packs/day for 20.0 years (5.0 ttl pk-yrs)    Types: Cigarettes    Start date: 2003    Quit date: 2023    Years since quittin.3   Smokeless Tobacco Never   Tobacco Comments    1 cig a day 10/2023 Quit       Family History:   Family History   Problem Relation Age of Onset    Breast cancer Mother 63        again at 71 years old.    No Known Problems Father     No Known Problems Daughter     Breast cancer Maternal Aunt 65    Pancreatic cancer Maternal Uncle         80's    No Known Problems Maternal Grandmother     No Known Problems Maternal Grandfather     No Known Problems Paternal Grandmother     No Known Problems Paternal Grandfather        Meds/Allergies   current meds:   Current Facility-Administered Medications   Medication Dose Route Frequency    apixaban (ELIQUIS) tablet 5 mg  5 mg Oral BID    diltiazem (CARDIZEM) 125 mg in sodium chloride 0.9 % 125 mL infusion  1-15 mg/hr Intravenous Titrated    metoprolol tartrate (LOPRESSOR) tablet 25 mg  25 mg Oral Q8H    sodium chloride (PF) 0.9 % injection 3 mL  3 mL Intravenous Q1H PRN     Allergies   Allergen Reactions    Celebrex [Celecoxib] Tongue Swelling    Sulfa  Antibiotics Hives and Tongue Swelling    Amlodipine Other (See Comments)     Possible  ankle swelling on 5 mg    Lisinopril Other (See Comments)     dizziness    Methocarbamol Other (See Comments)     Nightmares    Other Sneezing     Seasonal allergies    Sulfamethoxazole-Trimethoprim Hives       Objective   Vitals: Blood pressure 106/67, pulse (!) 146, temperature 97.5 °F (36.4 °C), temperature source Oral, resp. rate 16, weight 59.4 kg (130 lb 15.3 oz), SpO2 96%., Body mass index is 23.2 kg/m².,   Orthostatic Blood Pressures      Flowsheet Row Most Recent Value   Blood Pressure 106/67 filed at 2024 1345   Patient Position - Orthostatic VS Lying filed at 2024 1345            Systolic (24hrs), Av , Min:100 , Max:131     Diastolic (24hrs), Av, Min:62, Max:77      No intake or output data in the 24 hours ending 24 1355    Invasive Devices       Peripheral Intravenous Line  Duration             Peripheral IV 24 Right Antecubital <1 day                      Physical Exam:  Physical Exam  Constitutional:       General: She is not in acute distress.     Appearance: Normal appearance. She is well-developed. She is not diaphoretic.   HENT:      Head: Normocephalic.   Eyes:      Conjunctiva/sclera: Conjunctivae normal.      Pupils: Pupils are equal, round, and reactive to light.   Neck:      Vascular: No JVD.   Cardiovascular:      Rate and Rhythm: Tachycardia present. Rhythm irregularly irregular.      Heart sounds: Normal heart sounds. No murmur heard.     No friction rub. No gallop.   Pulmonary:      Effort: Pulmonary effort is normal. No respiratory distress.      Breath sounds: Normal breath sounds. No stridor. No wheezing or rales.   Abdominal:      General: Abdomen is flat. Bowel sounds are normal. There is no distension.      Palpations: Abdomen is soft.      Tenderness: There is no abdominal tenderness. There is no guarding.   Musculoskeletal:         General: Normal range of motion.       Right lower leg: No edema.      Left lower leg: No edema.   Skin:     General: Skin is warm and dry.   Neurological:      General: No focal deficit present.      Mental Status: She is alert and oriented to person, place, and time.   Psychiatric:         Behavior: Behavior normal.          EKG:   Date: 1/29/2024  Interpretation: Atrial fibrillation with rapid ventricular response    Imaging: I have personally reviewed pertinent reports.      Telemetry:   A fib with RVR    ECHO: ordered    CATH/STRESS TEST:   03/08/2018 indication-chest pain and prior abnormal EKG (showing possible left atrial enlargement): Negative for ischemia.  Test terminated due to leg fatigue and after achieving target HR    CXR: pending final read    Lab Results:     Cardiac Profile:   Results from last 7 days   Lab Units 01/29/24  1320   HS TNI 0HR ng/L 10       BNP: 22    CBC with diff:   Results from last 7 days   Lab Units 01/29/24  1320   WBC Thousand/uL 11.46*   HEMOGLOBIN g/dL 15.9*   HEMATOCRIT % 47.8*   MCV fL 94   PLATELETS Thousands/uL 488*   RBC Million/uL 5.10   MCH pg 31.2   MCHC g/dL 33.3   RDW % 12.5   MPV fL 10.1   NRBC AUTO /100 WBCs 0         Lucina Valladares  MS4

## 2024-01-30 ENCOUNTER — APPOINTMENT (OUTPATIENT)
Dept: NON INVASIVE DIAGNOSTICS | Facility: HOSPITAL | Age: 59
End: 2024-01-30
Attending: STUDENT IN AN ORGANIZED HEALTH CARE EDUCATION/TRAINING PROGRAM
Payer: COMMERCIAL

## 2024-01-30 ENCOUNTER — ANESTHESIA EVENT (OUTPATIENT)
Dept: NON INVASIVE DIAGNOSTICS | Facility: HOSPITAL | Age: 59
End: 2024-01-30
Payer: COMMERCIAL

## 2024-01-30 ENCOUNTER — APPOINTMENT (OUTPATIENT)
Dept: NON INVASIVE DIAGNOSTICS | Facility: HOSPITAL | Age: 59
End: 2024-01-30
Payer: COMMERCIAL

## 2024-01-30 ENCOUNTER — ANESTHESIA (OUTPATIENT)
Dept: NON INVASIVE DIAGNOSTICS | Facility: HOSPITAL | Age: 59
End: 2024-01-30
Payer: COMMERCIAL

## 2024-01-30 VITALS
HEART RATE: 95 BPM | BODY MASS INDEX: 23.04 KG/M2 | OXYGEN SATURATION: 97 % | DIASTOLIC BLOOD PRESSURE: 97 MMHG | WEIGHT: 130 LBS | RESPIRATION RATE: 18 BRPM | TEMPERATURE: 98.7 F | SYSTOLIC BLOOD PRESSURE: 141 MMHG | HEIGHT: 63 IN

## 2024-01-30 LAB
ANION GAP SERPL CALCULATED.3IONS-SCNC: 6 MMOL/L
AORTIC ROOT: 2.9 CM
AORTIC VALVE MEAN VELOCITY: 6.7 M/S
APICAL FOUR CHAMBER EJECTION FRACTION: 56 %
ATRIAL RATE: 100 BPM
ATRIAL RATE: 113 BPM
ATRIAL RATE: 258 BPM
ATRIAL RATE: 76 BPM
AV AREA BY CONTINUOUS VTI: 2.7 CM2
AV AREA PEAK VELOCITY: 2.4 CM2
AV LVOT MEAN GRADIENT: 2 MMHG
AV LVOT PEAK GRADIENT: 2 MMHG
AV MEAN GRADIENT: 2 MMHG
AV PEAK GRADIENT: 4 MMHG
AV VALVE AREA: 2.75 CM2
AV VELOCITY RATIO: 0.77
BSA FOR ECHO PROCEDURE: 1.61 M2
BUN SERPL-MCNC: 13 MG/DL (ref 5–25)
CALCIUM SERPL-MCNC: 8.8 MG/DL (ref 8.4–10.2)
CHLORIDE SERPL-SCNC: 109 MMOL/L (ref 96–108)
CO2 SERPL-SCNC: 25 MMOL/L (ref 21–32)
CREAT SERPL-MCNC: 0.65 MG/DL (ref 0.6–1.3)
DOP CALC AO PEAK VEL: 0.96 M/S
DOP CALC AO VTI: 15.44 CM
DOP CALC LVOT AREA: 3.14 CM2
DOP CALC LVOT CARDIAC INDEX: 2.5 L/MIN/M2
DOP CALC LVOT CARDIAC OUTPUT: 4.03 L/MIN
DOP CALC LVOT DIAMETER: 2 CM
DOP CALC LVOT PEAK VEL VTI: 13.51 CM
DOP CALC LVOT PEAK VEL: 0.74 M/S
DOP CALC LVOT STROKE INDEX: 25.5 ML/M2
DOP CALC LVOT STROKE VOLUME: 42.42
ERYTHROCYTE [DISTWIDTH] IN BLOOD BY AUTOMATED COUNT: 12.8 % (ref 11.6–15.1)
FRACTIONAL SHORTENING: 35 (ref 28–44)
GFR SERPL CREATININE-BSD FRML MDRD: 97 ML/MIN/1.73SQ M
GLUCOSE P FAST SERPL-MCNC: 102 MG/DL (ref 65–99)
GLUCOSE SERPL-MCNC: 102 MG/DL (ref 65–140)
HCT VFR BLD AUTO: 43.3 % (ref 34.8–46.1)
HGB BLD-MCNC: 14.4 G/DL (ref 11.5–15.4)
INTERVENTRICULAR SEPTUM IN DIASTOLE (PARASTERNAL SHORT AXIS VIEW): 1.2 CM
INTERVENTRICULAR SEPTUM: 1.2 CM (ref 0.6–1.1)
LAAS-AP4: 20.9 CM2
LEFT ATRIUM AREA SYSTOLE SINGLE PLANE A4C: 19 CM2
LEFT ATRIUM SIZE: 3.2 CM
LEFT INTERNAL DIMENSION IN SYSTOLE: 2.6 CM (ref 2.1–4)
LEFT VENTRICULAR INTERNAL DIMENSION IN DIASTOLE: 4 CM (ref 3.5–6)
LEFT VENTRICULAR POSTERIOR WALL IN END DIASTOLE: 1.2 CM
LEFT VENTRICULAR STROKE VOLUME: 43 ML
LVSV (TEICH): 43 ML
MCH RBC QN AUTO: 31.5 PG (ref 26.8–34.3)
MCHC RBC AUTO-ENTMCNC: 33.3 G/DL (ref 31.4–37.4)
MCV RBC AUTO: 95 FL (ref 82–98)
P AXIS: 60 DEGREES
PLATELET # BLD AUTO: 403 THOUSANDS/UL (ref 149–390)
PMV BLD AUTO: 10.3 FL (ref 8.9–12.7)
POTASSIUM SERPL-SCNC: 4.1 MMOL/L (ref 3.5–5.3)
PR INTERVAL: 146 MS
QRS AXIS: -7 DEGREES
QRS AXIS: 21 DEGREES
QRS AXIS: 3 DEGREES
QRS AXIS: 8 DEGREES
QRSD INTERVAL: 82 MS
QRSD INTERVAL: 82 MS
QRSD INTERVAL: 84 MS
QRSD INTERVAL: 86 MS
QT INTERVAL: 254 MS
QT INTERVAL: 306 MS
QT INTERVAL: 360 MS
QT INTERVAL: 398 MS
QTC INTERVAL: 425 MS
QTC INTERVAL: 443 MS
QTC INTERVAL: 447 MS
QTC INTERVAL: 455 MS
RBC # BLD AUTO: 4.57 MILLION/UL (ref 3.81–5.12)
RIGHT ATRIUM AREA SYSTOLE A4C: 13.4 CM2
RIGHT VENTRICLE ID DIMENSION: 3.6 CM
SL CV PED ECHO LEFT VENTRICLE DIASTOLIC VOLUME (MOD BIPLANE) 2D: 68 ML
SL CV PED ECHO LEFT VENTRICLE SYSTOLIC VOLUME (MOD BIPLANE) 2D: 25 ML
SODIUM SERPL-SCNC: 140 MMOL/L (ref 135–147)
T WAVE AXIS: 29 DEGREES
T WAVE AXIS: 36 DEGREES
T WAVE AXIS: 51 DEGREES
T WAVE AXIS: 54 DEGREES
TR MAX PG: 17 MMHG
TR PEAK VELOCITY: 2.1 M/S
TRICUSPID ANNULAR PLANE SYSTOLIC EXCURSION: 1.7 CM
TRICUSPID VALVE PEAK REGURGITATION VELOCITY: 2.07 M/S
VENTRICULAR RATE: 133 BPM
VENTRICULAR RATE: 183 BPM
VENTRICULAR RATE: 76 BPM
VENTRICULAR RATE: 84 BPM
WBC # BLD AUTO: 9.24 THOUSAND/UL (ref 4.31–10.16)

## 2024-01-30 PROCEDURE — 99214 OFFICE O/P EST MOD 30 MIN: CPT | Performed by: STUDENT IN AN ORGANIZED HEALTH CARE EDUCATION/TRAINING PROGRAM

## 2024-01-30 PROCEDURE — 93010 ELECTROCARDIOGRAM REPORT: CPT | Performed by: STUDENT IN AN ORGANIZED HEALTH CARE EDUCATION/TRAINING PROGRAM

## 2024-01-30 PROCEDURE — 99239 HOSP IP/OBS DSCHRG MGMT >30: CPT | Performed by: PHYSICIAN ASSISTANT

## 2024-01-30 PROCEDURE — 93306 TTE W/DOPPLER COMPLETE: CPT | Performed by: INTERNAL MEDICINE

## 2024-01-30 PROCEDURE — RECHECK: Performed by: PHYSICIAN ASSISTANT

## 2024-01-30 PROCEDURE — 85027 COMPLETE CBC AUTOMATED: CPT | Performed by: INTERNAL MEDICINE

## 2024-01-30 PROCEDURE — 80048 BASIC METABOLIC PNL TOTAL CA: CPT | Performed by: INTERNAL MEDICINE

## 2024-01-30 PROCEDURE — 93306 TTE W/DOPPLER COMPLETE: CPT

## 2024-01-30 PROCEDURE — 93005 ELECTROCARDIOGRAM TRACING: CPT

## 2024-01-30 RX ORDER — DILTIAZEM HYDROCHLORIDE 120 MG/1
120 CAPSULE, COATED, EXTENDED RELEASE ORAL EVERY EVENING
Qty: 30 CAPSULE | Refills: 0 | Status: CANCELLED | OUTPATIENT
Start: 2024-01-30

## 2024-01-30 RX ORDER — SODIUM CHLORIDE, SODIUM LACTATE, POTASSIUM CHLORIDE, CALCIUM CHLORIDE 600; 310; 30; 20 MG/100ML; MG/100ML; MG/100ML; MG/100ML
INJECTION, SOLUTION INTRAVENOUS CONTINUOUS PRN
Status: DISCONTINUED | OUTPATIENT
Start: 2024-01-30 | End: 2024-01-30

## 2024-01-30 RX ORDER — DILTIAZEM HYDROCHLORIDE 120 MG/1
120 CAPSULE, COATED, EXTENDED RELEASE ORAL EVERY EVENING
Status: DISCONTINUED | OUTPATIENT
Start: 2024-01-30 | End: 2024-01-30 | Stop reason: HOSPADM

## 2024-01-30 RX ORDER — ONDANSETRON 2 MG/ML
4 INJECTION INTRAMUSCULAR; INTRAVENOUS ONCE AS NEEDED
Status: CANCELLED | OUTPATIENT
Start: 2024-01-30

## 2024-01-30 RX ORDER — DILTIAZEM HYDROCHLORIDE 120 MG/1
120 CAPSULE, COATED, EXTENDED RELEASE ORAL EVERY EVENING
Qty: 30 CAPSULE | Refills: 0 | Status: SHIPPED | OUTPATIENT
Start: 2024-01-30 | End: 2024-02-02

## 2024-01-30 RX ORDER — DILTIAZEM HYDROCHLORIDE 120 MG/1
120 CAPSULE, COATED, EXTENDED RELEASE ORAL EVERY EVENING
Status: DISCONTINUED | OUTPATIENT
Start: 2024-01-30 | End: 2024-01-30

## 2024-01-30 RX ADMIN — DILTIAZEM HYDROCHLORIDE 120 MG: 120 CAPSULE, COATED, EXTENDED RELEASE ORAL at 16:00

## 2024-01-30 RX ADMIN — APIXABAN 5 MG: 5 TABLET, FILM COATED ORAL at 06:19

## 2024-01-30 RX ADMIN — DILTIAZEM HYDROCHLORIDE 30 MG: 30 TABLET ORAL at 11:54

## 2024-01-30 RX ADMIN — DILTIAZEM HYDROCHLORIDE 30 MG: 30 TABLET ORAL at 06:19

## 2024-01-30 RX ADMIN — SODIUM CHLORIDE, SODIUM LACTATE, POTASSIUM CHLORIDE, AND CALCIUM CHLORIDE: .6; .31; .03; .02 INJECTION, SOLUTION INTRAVENOUS at 12:56

## 2024-01-30 RX ADMIN — APIXABAN 5 MG: 5 TABLET, FILM COATED ORAL at 16:00

## 2024-01-30 NOTE — NURSING NOTE
IV removed. AVS given to and reviewed with patient. Questions addressed. Patient's home medication sent to pharmacy is no longer required for the patient to take upon discharge, so patient requested disposal of medication here. Patient discharged.    Oralia VALDESN, RN

## 2024-01-30 NOTE — PROGRESS NOTES
[facility]  Progress Note  Name: Estella Laird I  MRN: [id number]  Unit/Bed#: E4 -01 I Date of Admission: 1/29/2024   Date of Service: 1/30/2024 I Hospital Day: 0    Assessment/Plan   * Atrial fibrillation (HCC)  Assessment & Plan  Resenting with atrial fibrillation with rapid ventricular response  Did take Sudafed for upper respiratory infection -possible etiology of her A-fib  Denies any excessive caffeine consumption  Started on diltiazem drip while in the ED, now with improved rates  Switched to oral diltiazem 30 mg q6  TSH and free T4 WNL  Echocardiogram pending  Cardiology consult by the emergency room  MARIAH cardioversion at 1pm  [id number]: 2  Initiated on Eliquis    Hypertension  Assessment & Plan  Prior to admission on valsartan which patient would like to continue  Hold valsartan for the time being    Intraductal papilloma  Assessment & Plan  History of breast cancer, not on any active treatment  Scheduled for OP follow up with MRI         VTE Pharmacologic Prophylaxis:   Moderate Risk (Score 3-4) - Pharmacological DVT Prophylaxis Ordered: apixaban (Eliquis).    Mobility:   Basic Mobility Inpatient Raw Score: 24  JH-HLM Goal: 8: Walk 250 feet or more  JH-HLM Achieved: 7: Walk 25 feet or more  HLM Goal achieved. Continue to encourage appropriate mobility.    Patient Centered Rounds: I performed bedside rounds with nursing staff today.   Discussions with Specialists or Other Care Team Provider: Cardiology    Education and Discussions with Family / Patient: Updated  ( and daughter) at bedside.    Total Time Spent on Date of Encounter in care of patient: 35 mins. This time was spent on one or more of the following: performing physical exam; counseling and coordination of care; obtaining or reviewing history; documenting in the medical record; reviewing/ordering tests, medications or procedures; communicating with other healthcare professionals and  discussing with patient's family/caregivers.    Current Length of Stay: 0 day(s)  Current Patient Status: Observation   Certification Statement: The patient will continue to require additional inpatient hospital stay due to MARIAH Cardioversion  Discharge Plan: Anticipate discharge tomorrow to home.    Code Status: Level 1 - Full Code    Subjective:   The patient is seen resting in bed. She is awaiting cardioversion. She reports that she is feeling well, just with some anxiety regarding the procedure. Denies chest pain, SOB, palpitations.    Objective:     Vitals:   Temp (24hrs), Av.2 °F (36.8 °C), Min:97.2 °F (36.2 °C), Max:99.2 °F (37.3 °C)    Temp:  [97.2 °F (36.2 °C)-99.2 °F (37.3 °C)] 98.7 °F (37.1 °C)  HR:  [] 95  Resp:  [14-20] 18  BP: ()/(51-95) 122/75  SpO2:  [93 %-100 %] 97 %  Body mass index is 23.03 kg/m².     Input and Output Summary (last 24 hours):     Intake/Output Summary (Last 24 hours) at 2024 1220  Last data filed at 2024 0628  Gross per 24 hour   Intake 510 ml   Output --   Net 510 ml       Physical Exam:   Physical Exam  Vitals and nursing note reviewed.   Constitutional:       General: She is not in acute distress.     Appearance: Normal appearance. She is not ill-appearing, toxic-appearing or diaphoretic.   HENT:      Head: Normocephalic and atraumatic.   Cardiovascular:      Rate and Rhythm: Normal rate. Rhythm irregular.      Heart sounds: No murmur heard.     No friction rub. No gallop.   Pulmonary:      Effort: Pulmonary effort is normal. No respiratory distress.      Breath sounds: Normal breath sounds. No wheezing, rhonchi or rales.   Neurological:      General: No focal deficit present.      Mental Status: She is alert. Mental status is at baseline.          Additional Data:     Labs:  Results from last 7 days   Lab Units 24  0615 24  1320   WBC Thousand/uL 9.24 11.46*   HEMOGLOBIN g/dL 14.4 15.9*   HEMATOCRIT % 43.3 47.8*   PLATELETS Thousands/uL  403* 488*   NEUTROS PCT %  --  59   LYMPHS PCT %  --  26   MONOS PCT %  --  9   EOS PCT %  --  5     Results from last 7 days   Lab Units 01/30/24  0615 01/29/24  1320   SODIUM mmol/L 140 141   POTASSIUM mmol/L 4.1 4.1   CHLORIDE mmol/L 109* 106   CO2 mmol/L 25 27   BUN mg/dL 13 20   CREATININE mg/dL 0.65 0.83   ANION GAP mmol/L 6 8   CALCIUM mg/dL 8.8 9.5   ALBUMIN g/dL  --  4.5   TOTAL BILIRUBIN mg/dL  --  0.52   ALK PHOS U/L  --  59   ALT U/L  --  13   AST U/L  --  21   GLUCOSE RANDOM mg/dL 102 109                       Lines/Drains:  Invasive Devices       Peripheral Intravenous Line  Duration             Peripheral IV 01/29/24 Right Antecubital <1 day                      Telemetry:  Telemetry Orders (From admission, onward)               24 Hour Telemetry Monitoring  Continuous x 24 Hours (Telem)        Question:  Reason for 24 Hour Telemetry  Answer:  Arrhythmias requiring acute medical intervention / PPM or ICD malfunction                     Telemetry Reviewed: Atrial fibrillation. HR averaging 70  Indication for Continued Telemetry Use: Arrthymias requiring medical therapy             Imaging: No pertinent imaging reviewed.    Recent Cultures (last 7 days):         Last 24 Hours Medication List:   Current Facility-Administered Medications   Medication Dose Route Frequency Provider Last Rate    apixaban  5 mg Oral BID Jose Manuel Nguyen DO      diltiazem  1-15 mg/hr Intravenous Titrated Jose Manuel Nguyen DO Stopped (01/29/24 1833)    diltiazem  30 mg Oral Q6H [facility] Jose Manuel Nguyen DO      sodium chloride (PF)  3 mL Intravenous Q1H PRN Jose Manuel Nguyen DO          Today, Patient Was Seen By: Kam Aranda PA-C    **Please Note: This note may have been constructed using a voice recognition system.**

## 2024-01-30 NOTE — PROGRESS NOTES
Cardiology - Progress Note  Estella Laird 58 y.o. female MRN: [id number]  Unit/Bed#: E4 -01 Encounter: [id number]  01/30/24  12:25 PM    Encounter date: 1/30/24  Patient name: Estella Laird 58 y.o. female  Medical Student: Lucina Valladares MS4  Primary care provider: Myrna Rodriguez DO  Cardiology Attending: Jayden Capps MD   Date of admission: 1/29/24  Length of stay: 1 Day      Assessment/ Plan:     Atrial Fibrillation with RVR, new onset:  -Initially presented in a fib with RVR in the 180s. Received 15 mg cardizem in the ER and later started on cardizem gtt. Now transitioned to 30 mg PO cardizem with improved HR's. Still in a fib as of this morning.     Magnesium, TSH, BNP within normal range. No other prior episodes and no personal history of cardiac disease. [id number] score of 2 (female sex and HTN). Denies history of sleep apnea.      Plan:  Continue PO cardizem 30 mg q6 hr-> plan to transition to long-acting Cardizem post MARIAH cardioversion  Will follow-up with echo   Plan for cardioversion with MARIAH today  Given [id number] score of 2 (female, hypertension), continue Eliquis 5 mg twice daily-> please price check Eliquis prior to discharge     Hypertriglyceridemia   -History of high triglycerides (latest lipid panel in 6/2023 showing triglyceride value of 318). Cholesterol, LDL, HDL within normal range     Plan:  Counseled patient on diet modification and incorporating healthy lifestyle changes     Hypertension  - History of HTN for which she previously took lisinopril (lightheadedness side effect), amlodipine (leg edema side effect), and metoprolol (night terror side effect). At home prior to coming to ED she was on valsartan 160 mg QD. Systolic pressures range from 100-130s; diastolic pressure range from 60-90s since being here in hospital      Plan:   Holding home valsartan  Continue with Cardizem 30 mg every 6 hours, may replace valsartan with Cardizem on discharge depending on blood pressure     4.    History of Breast cancer  -She is s/p left lumpectomy for a papilloma with atypical ductal hyperplasia. No concerns noted at most recent surgical oncology appointment. Is not currently on any chemotherapy.      Plan:  Follow-up with surgical oncology and future MRIs scheduled     5.   Anxiety  -History of anxiety and panic attacks for which she is prescribed xanax 0.25 mg prn and clonazepam 0.5 mg QD at bedtime. Reports recent major stressors in her life.      Plan:  Holding home meds currently, will discuss about possible outpatient therapy. Daughter and  at bedside supporting her throughout hospitalization stay      6.   History of dysphagia  -Longstanding history of dysphagia, most recent EGD performed in 05/2023 this past year showed no endoscopic abnormality to explain her dysphagia. They did proceed with dilating entire esophagus. She reports no dysphagia or any concerns swallowing food at this time.      7.   Alcohol use   - Reports about 2 glasses of wine each night with dinner.     Plan:  Counseled patient on reducing alcohol intake as it can be a triggering factor for the development of atrial fibrillation     Summary:  - Plan for MARIAH cardioversion today  - Continue with Cardizem 30 mg every 6 hours, plan to change to long-acting Cardizem post procedure  - Keep n.p.o. for procedure  - Price check Eliquis prior to discharge    Addendum: Patient spontaneously converted back to sinus rhythm shortly before her MARIAH cardioversion.  Due to this, her procedure was canceled.  Changed to short acting diltiazem to long-acting diltiazem 120 mg daily.  She is stable from cardiac standpoint for discharge home this afternoon.  Will message our office to arrange an outpatient follow-up appointment.        HPI: Today, Estella Laird denies any palpitations, chest pain, shortness of breath, nausea, vomiting, and lightheadedness. She reports some anxiety about the upcoming procedure today. She was accompanied by her  "daughter and .       Objective   Vitals: Blood pressure 122/75, pulse 95, temperature 98.7 °F (37.1 °C), temperature source Temporal, resp. rate 18, height 5' 3\" (1.6 m), weight 59 kg (130 lb), SpO2 97%., Body mass index is 23.03 kg/m².,   Orthostatic Blood Pressures      Flowsheet Row Most Recent Value   Blood Pressure 122/75 filed at 2024 1147   Patient Position - Orthostatic VS Lying filed at 2024 1147            Systolic (24hrs), Av , Min:90 , Max:131     Diastolic (24hrs), Av, Min:51, Max:95        Intake/Output Summary (Last 24 hours) at 2024 1225  Last data filed at 2024 0628  Gross per 24 hour   Intake 510 ml   Output --   Net 510 ml       Invasive Devices       Peripheral Intravenous Line  Duration             Peripheral IV 24 Right Antecubital <1 day                      Physical Exam:  Physical Exam  Constitutional:       General: She is not in acute distress.     Appearance: Normal appearance. She is well-developed. She is not ill-appearing or diaphoretic.   HENT:      Head: Normocephalic.   Eyes:      Conjunctiva/sclera: Conjunctivae normal.      Pupils: Pupils are equal, round, and reactive to light.   Neck:      Vascular: No JVD.   Cardiovascular:      Rate and Rhythm: Normal rate. Rhythm irregular.      Heart sounds: Normal heart sounds. No murmur heard.     No friction rub. No gallop.   Pulmonary:      Effort: Pulmonary effort is normal. No respiratory distress.      Breath sounds: Normal breath sounds. No stridor. No wheezing.   Abdominal:      General: Bowel sounds are normal. There is no distension.      Palpations: Abdomen is soft.      Tenderness: There is no abdominal tenderness.   Musculoskeletal:         General: Normal range of motion.      Right lower leg: No edema.      Left lower leg: No edema.   Skin:     General: Skin is warm and dry.   Neurological:      General: No focal deficit present.      Mental Status: She is alert and oriented to " person, place, and time.   Psychiatric:         Behavior: Behavior normal.          EKG:   Date: 1/29/24  Interpretation: Atrial fibrillation     Telemetry:   Atrial fibrillation   Pulse 70s     Lab Results:       CBC with diff:   Results from last 7 days   Lab Units 01/30/24  0615 01/29/24  1320   WBC Thousand/uL 9.24 11.46*   HEMOGLOBIN g/dL 14.4 15.9*   HEMATOCRIT % 43.3 47.8*   MCV fL 95 94   PLATELETS Thousands/uL 403* 488*   RBC Million/uL 4.57 5.10   MCH pg 31.5 31.2   MCHC g/dL 33.3 33.3   RDW % 12.8 12.5   MPV fL 10.3 10.1   NRBC AUTO /100 WBCs  --  0       CMP:   Results from last 7 days   Lab Units 01/30/24  0615 01/29/24  1320   POTASSIUM mmol/L 4.1 4.1   CHLORIDE mmol/L 109* 106   CO2 mmol/L 25 27   BUN mg/dL 13 20   CREATININE mg/dL 0.65 0.83   CALCIUM mg/dL 8.8 9.5   AST U/L  --  21   ALT U/L  --  13   ALK PHOS U/L  --  59   EGFR ml/min/1.73sq m 97 77       Lucina Valladares  MS4

## 2024-01-30 NOTE — PLAN OF CARE
Problem: Potential for Falls  Goal: Patient will remain free of falls  Description: INTERVENTIONS:  - Educate patient/family on patient safety including physical limitations  - Instruct patient to call for assistance with activity   - Consult OT/PT to assist with strengthening/mobility   - Keep Call bell within reach  - Keep bed low and locked with side rails adjusted as appropriate  - Keep care items and personal belongings within reach  - Initiate and maintain comfort rounds  - Make Fall Risk Sign visible to staff  - Offer Toileting every 2 Hours, in advance of need  - Initiate/Maintain bed alarm  - Obtain necessary fall risk management equipment  - Apply yellow socks and bracelet for high fall risk patients  - Consider moving patient to room near nurses station  Outcome: Progressing     Problem: PAIN - ADULT  Goal: Verbalizes/displays adequate comfort level or baseline comfort level  Description: Interventions:  - Encourage patient to monitor pain and request assistance  - Assess pain using appropriate pain scale  - Administer analgesics based on type and severity of pain and evaluate response  - Implement non-pharmacological measures as appropriate and evaluate response  - Consider cultural and social influences on pain and pain management  - Notify physician/advanced practitioner if interventions unsuccessful or patient reports new pain  Outcome: Progressing     Problem: INFECTION - ADULT  Goal: Absence or prevention of progression during hospitalization  Description: INTERVENTIONS:  - Assess and monitor for signs and symptoms of infection  - Monitor lab/diagnostic results  - Monitor all insertion sites, i.e. indwelling lines, tubes, and drains  - Monitor endotracheal if appropriate and nasal secretions for changes in amount and color  - [place] appropriate cooling/warming therapies per order  - Administer medications as ordered  - Instruct and encourage patient and family to use good hand hygiene  technique  - Identify and instruct in appropriate isolation precautions for identified infection/condition  Outcome: Progressing  Goal: Absence of fever/infection during neutropenic period  Description: INTERVENTIONS:  - Monitor WBC    Outcome: Progressing     Problem: SAFETY ADULT  Goal: Patient will remain free of falls  Description: INTERVENTIONS:  - Educate patient/family on patient safety including physical limitations  - Instruct patient to call for assistance with activity   - Consult OT/PT to assist with strengthening/mobility   - Keep Call bell within reach  - Keep bed low and locked with side rails adjusted as appropriate  - Keep care items and personal belongings within reach  - Initiate and maintain comfort rounds  - Make Fall Risk Sign visible to staff  - Offer Toileting every 2 Hours, in advance of need  - Initiate/Maintain bed alarm  - Obtain necessary fall risk management equipment  - Apply yellow socks and bracelet for high fall risk patients  - Consider moving patient to room near nurses station  Outcome: Progressing  Goal: Maintain or return to baseline ADL function  Description: INTERVENTIONS:  - Educate patient/family on patient safety including physical limitations  - Instruct patient to call for assistance with activity   - Consult OT/PT to assist with strengthening/mobility   - Keep Call bell within reach  - Keep bed low and locked with side rails adjusted as appropriate  - Keep care items and personal belongings within reach  - Initiate and maintain comfort rounds  - Make Fall Risk Sign visible to staff  - Offer Toileting every 2 Hours, in advance of need  - Initiate/Maintain bed alarm  - Obtain necessary fall risk management equipment  - Apply yellow socks and bracelet for high fall risk patients  - Consider moving patient to room near nurses station  Outcome: Progressing  Goal: Maintains/Returns to pre admission functional level  Description: INTERVENTIONS:  - Perform AM-PAC 6 Click Basic  Mobility/ Daily Activity assessment daily.  - Set and communicate daily mobility goal to care team and patient/family/caregiver.   - Collaborate with rehabilitation services on mobility goals if consulted  - Perform Range of Motion 3 times a day.  - Reposition patient every 2 hours.  - Dangle patient 3 times a day  - Stand patient 3 times a day  - Ambulate patient 3 times a day  - Out of bed to chair 3 times a day   - Out of bed for meals 3 times a day  - Out of bed for toileting  - Record patient progress and toleration of activity level   Outcome: Progressing     Problem: DISCHARGE PLANNING  Goal: Discharge to home or other facility with appropriate resources  Description: INTERVENTIONS:  - Identify barriers to discharge w/patient and caregiver  - Arrange for needed discharge resources and transportation as appropriate  - Identify discharge learning needs (meds, wound care, etc.)  - Arrange for interpretive services to assist at discharge as needed  - Refer to Case Management Department for coordinating discharge planning if the patient needs post-hospital services based on physician/advanced practitioner order or complex needs related to functional status, cognitive ability, or social support system  Outcome: Progressing     Problem: Knowledge Deficit  Goal: Patient/family/caregiver demonstrates understanding of disease process, treatment plan, medications, and discharge instructions  Description: Complete learning assessment and assess knowledge base.  Interventions:  - Provide teaching at level of understanding  - Provide teaching via preferred learning methods  Outcome: Progressing

## 2024-01-30 NOTE — ASSESSMENT & PLAN NOTE
Resenting with atrial fibrillation with rapid ventricular response  Did take Sudafed for upper respiratory infection -possible etiology of her A-fib  Denies any excessive caffeine consumption  Started on diltiazem drip while in the ED, now with improved rates  Switched to oral diltiazem 30 mg q6  TSH and free T4 WNL  Echocardiogram pending  Cardiology consult by the emergency room  MARIAH cardioversion at 1pm  [id number]: 2  Initiated on Eliquis

## 2024-01-30 NOTE — ANESTHESIA PREPROCEDURE EVALUATION
Procedure:  MARIAH    Relevant Problems   CARDIO   (+) Atrial fibrillation (HCC)   (+) Hypertension   (+) Hypertriglyceridemia      GI/HEPATIC   (+) Dysphagia      NEURO/PSYCH   (+) Chronic neck pain      Other   (+) Tobacco use disorder        Physical Exam    Airway      TM Distance: >3 FB  Neck ROM: full     Dental   No notable dental hx     Cardiovascular  Rhythm: regular, Rate: normal, Cardiovascular exam normal    Pulmonary  Pulmonary exam normal Breath sounds clear to auscultation    Other Findings  post-pubertal.      Anesthesia Plan  ASA Score- 3     Anesthesia Type- general with ASA Monitors.         Additional Monitors:     Airway Plan:            Plan Factors-    Chart reviewed. EKG reviewed. Imaging results reviewed. Existing labs reviewed. Patient summary reviewed.                  Induction-     Postoperative Plan-     Informed Consent- Anesthetic plan and risks discussed with patient.  I personally reviewed this patient with the CRNA. Discussed and agreed on the Anesthesia Plan with the CRNA..

## 2024-01-30 NOTE — UTILIZATION REVIEW
Initial Clinical Review    Admission: Date/Time/Statement:   Admission Orders (From admission, onward)       Ordered        01/29/24 1437  Place in Observation  Once                          Orders Placed This Encounter   Procedures    Place in Observation     Standing Status:   Standing     Number of Occurrences:   1     Order Specific Question:   Level of Care     Answer:   Med Surg [16]     Order Specific Question:   Bed Type     Answer:   Ann [4]     ED Arrival Information       Expected   -    Arrival   1/29/2024 12:58    Acuity   Emergent              Means of arrival   Walk-In    Escorted by   Self    Service   Hospitalist    Admission type   Emergency              Arrival complaint   Palpitations,Dizziness             Chief Complaint   Patient presents with    Palpitations     Pt started with palpitations at approx 1100. Denies cp. Hx of anxiety, last panic attack approx 20 years ago, took half a xanax at approx 1130 without relief. Stressors at home. Denies cp. Also complaining of dizziness.        Initial Presentation: 58 y.o. female , presented to the ED @ [facility], from home via walk in.   Admitted as Observation due to heart palpitations > Atrial Fibrillation.    PMH:  HTN,  breast cancer status postlumpectomy, not currently on chemotherapy.  Date: 01/29/2024    Presents with heart palpitations, patient thought she was having a panic attack and took a Xanax.    In the emergency room she was noted to be in atrial fibrillation with rapid ventricular response.  Her only recent medication change has been she took Sudafed for an upper respiratory infection.    Telemetry.  Started on IV cardizem gtt.  Check TSH & Free T4.  Follow up ECHO.  Consult Cardio.      01/29/2024  Consult Cardio:    Atrial Fibrillation with RVR, new onset:  -Presented in a fib with RVR in the 180s. Received 15 mg cardizem in the ER and started on cardizem gtt currently at 15 ml/hr. Heart rate now in the 130-140s. Magnesium,  TSH, BNP within normal range. No other prior episodes and no personal history of cardiac disease. [id number] score of 2 (female sex and HTN). Denies history of sleep apnea. Previous use of sudafed for the past two weeks possibly related to new onset.   Plan:  Continue cardizem gtt  Plan for cardioversion with MARIAH tomorrow if she remains in a fib  NPO after midnight   Will start metoprolol tartrate 25 mg q8h tonight to decrease HR further until cardioversion  Was understanding and willing to take this medication despite previous side effect of night terrors  Given [id number] score of 2 (female, hypertension), started on Eliquis 5 mg twice daily  Echo ordered.  Hypertriglyceridemia   -History of high triglycerides (latest lipid panel in 6/2023 showing triglyceride value of 318). Cholesterol, LDL, HDL within normal range.   Plan:  Counseled patient on diet modification and incorporating healthy lifestyle changes.   Hypertension  - History of HTN for which she previously took lisinopril (lightheadedness side effect), amlodipine (leg edema side effect), and metoprolol (night terror side effect). At home prior to coming to ED she was on valsartan 160 mg QD. Systolic pressures range from 100-130s; diastolic pressure range from 67-95 since being here in hospital.   Plan:   Holding home valsartan to give more room for blood pressure control.   4.   History of Breast cancer  -She is s/p left lumpectomy for a papilloma with atypical ductal hyperplasia. No concerns noted at most recent surgical oncology appointment. Is not currently on any chemotherapy.   Plan:  Follow-up with surgical oncology and future MRIs scheduled.   5.   Anxiety  -History of anxiety and panic attacks for which she is prescribed xanax 0.25 mg prn and clonazepam 0.5 mg QD at bedtime. Reports recent major stressors in her life.    Plan:  Holding home meds currently, will discuss about possible outpatient therapy. Daughter and  at bedside today  supporting her.   6.   History of dysphagia  -Longstanding history of dysphagia, most recent EGD performed in 05/2023 this past year showed no endoscopic abnormality to explain her dysphagia. They did proceed with dilating entire esophagus. Today she reports no dysphagia or any concerns swallowing food. No concern at this time prior to MARIAH and probe use. Counseled patient on risks associated with MARIAH.    7.   Alcohol use   - Reports about 2 glasses of wine each night with dinner.   Plan:  Counseled patient on reducing alcohol intake as it can be a triggering factor for the development of atrial fibrillation.   Summary:  - Continue with Cardizem drip  - Started on Lopressor 25 mg every 8 hours to better control heart rates  - Continue with Eliquis 5 mg twice daily for anticoagulation  - Keep n.p.o. after midnight for possible MARIAH cardioversion tomorrow if patient remains in atrial fibrillation.    Day 2: 01/30/2024  Telemetry.  Continue IV cardizem gtt.  NPO.      ED Triage Vitals   Temperature Pulse Respirations Blood Pressure SpO2   01/29/24 1311 01/29/24 1307 01/29/24 1307 01/29/24 1307 01/29/24 1307   97.5 °F (36.4 °C) (!) 181 18 100/77 97 %      Temp Source Heart Rate Source Patient Position - Orthostatic VS BP Location FiO2 (%)   01/29/24 1311 01/29/24 1307 01/29/24 1345 01/29/24 1307 --   Oral Monitor Lying Left arm       Pain Score       01/29/24 1700       No Pain          Wt Readings from Last 1 Encounters:   01/30/24 59.2 kg (130 lb 8.2 oz)     Additional Vital Signs:   Date/Time Temp Pulse Resp BP MAP (mmHg) SpO2 O2 Device Patient Position - Orthostatic VS   01/30/24 0823 99.2 °F (37.3 °C) 75 18 113/71 86 93 % None (Room air) Sitting   01/30/24 0619 -- 68 -- 113/66 -- 95 % None (Room air) Lying   01/30/24 0314 97.7 °F (36.5 °C) 73 -- 103/57 76 93 % None (Room air) Lying   01/29/24 2337 97.9 °F (36.6 °C) 84 18 114/78 88 95 % None (Room air) Lying   01/29/24 1940 -- -- -- 99/66 76 -- -- Sitting   01/29/24  1924 97.2 °F (36.2 °C) Abnormal  62 18 92/53 67 97 % None (Room air) Lying   24 1833 -- 63 -- 90/51 65 -- -- --   24 1705 99.1 °F (37.3 °C) 100 20 113/68 -- 100 % None (Room air) Sitting   24 1615 -- 132 Abnormal  18 122/80 97 99 % None (Room air) Lying   24 1530 -- 127 Abnormal  18 112/85 93 98 % None (Room air) --   24 1515 -- 142 Abnormal  14 109/84 93 98 % None (Room air) Lying   24 1513 -- 143 Abnormal  -- -- -- -- -- --   24 1445 -- 152 Abnormal  18 114/95 101 96 % -- --   24 1415 -- 153 Abnormal  18 111/79 89 97 % None (Room air) --   24 1406 -- -- -- 126/89 -- -- -- --   24 1345 -- 146 Abnormal  16 106/67 -- 96 % None (Room air) Lying   24 1336 -- 183 Abnormal  -- 131/62 -- -- -- --     2024  1512  EC v, 113 a,  Atrial fibrillation with RVR.    2024  1720  EC v, 258 a, Atrial Fibrillation    Pertinent Labs/Diagnostic Test Results:   X-ray chest 1 view portable   Final Result by Ignacio Olivas MD ([id number])   No acute disease in the chest.        Results from last 7 days   Lab Units 24  0615 24  1320   WBC Thousand/uL 9.24 11.46*   HEMOGLOBIN g/dL 14.4 15.9*   HEMATOCRIT % 43.3 47.8*   PLATELETS Thousands/uL 403* 488*   NEUTROS ABS Thousands/µL  --  6.72     Results from last 7 days   Lab Units 24  0615 24  1320   SODIUM mmol/L 140 141   POTASSIUM mmol/L 4.1 4.1   CHLORIDE mmol/L 109* 106   CO2 mmol/L 25 27   ANION GAP mmol/L 6 8   BUN mg/dL 13 20   CREATININE mg/dL 0.65 0.83   EGFR ml/min/1.73sq m 97 77   CALCIUM mg/dL 8.8 9.5   MAGNESIUM mg/dL  --  2.2     Results from last 7 days   Lab Units 24  1320   AST U/L 21   ALT U/L 13   ALK PHOS U/L 59   TOTAL PROTEIN g/dL 7.1   ALBUMIN g/dL 4.5   TOTAL BILIRUBIN mg/dL 0.52     Results from last 7 days   Lab Units 24  0615 24  1320   GLUCOSE RANDOM mg/dL 102 109       Results from last 7 days   Lab Units 24  1734  01/29/24  1518 01/29/24  1320   HS TNI 0HR ng/L  --   --  10   HS TNI 2HR ng/L  --  25  --    HSTNI D2 ng/L  --  15  --    HS TNI 4HR ng/L 37  --   --    HSTNI D4 ng/L 27*  --   --      Results from last 7 days   Lab Units 01/29/24  1320   TSH 3RD GENERATON uIU/mL 2.674     Results from last 7 days   Lab Units 01/29/24  1320   BNP pg/mL 22     ED Treatment:   Medication Administration from 01/29/2024 1258 to 01/29/2024 1652         Date/Time Order Dose Route Action     01/29/2024 1336 EST diltiazem (CARDIZEM) 125 mg in sodium chloride 0.9 % 125 mL infusion 2.5 mg/hr Intravenous New Bag     01/29/2024 1335 EST diltiazem (CARDIZEM) injection 15 mg 15 mg Intravenous Given     01/29/2024 1519 EST apixaban (ELIQUIS) tablet 5 mg 5 mg Oral Given          Past Medical History:   Diagnosis Date    Anxiety     Breast lump in female     left-  lumpectomy today 10/31/2023    Hypertension      Present on Admission:   Intraductal papilloma      Admitting Diagnosis: Atrial fibrillation (HCC) [I48.91]  Palpitations [R00.2]  Age/Sex: 58 y.o. female  Admission Orders:  NPO, sips w meds  Up & OOB as tolerated  Telemetry  Daily weight  Obtain ECHO  Schedule MARIAH CV    Scheduled Medications:  apixaban, 5 mg, Oral, BID  diltiazem, 30 mg, Oral, Q6H MUSA      Continuous IV Infusions:  diltiazem, 1-15 mg/hr, Intravenous, Titrated      PRN Meds:  sodium chloride (PF), 3 mL, Intravenous, Q1H PRN        IP CONSULT TO CARDIOLOGY    Network Utilization Review Department  ATTENTION: Please call with any questions or concerns to [phone] and carefully listen to the prompts so that you are directed to the right person. All voicemails are confidential.   For Discharge needs, contact Care Management DC Support Team at [phone] opt. 2  Send all requests for admission clinical reviews, approved or denied determinations and any other requests to dedicated fax number below belonging to the campus where the patient is receiving treatment. List of  dedicated fax numbers for the Facilities:  FACILITY NAME UR FAX NUMBER   ADMISSION DENIALS (Administrative/Medical Necessity) [phone]   DISCHARGE SUPPORT TEAM (NETWORK) [phone]   PARENT CHILD HEALTH (Maternity/NICU/Pediatrics) [phone]   [facility] [phone]   [facility] [phone]   [facility] [phone]   [facility] [phone]   [facility] [phone]   [facility] [phone]   [facility] [phone]   [facility] [phone]   [facility] [phone]   [facility], [facility] [phone]   General acute hospital [phone]   [facility] [phone]

## 2024-01-30 NOTE — PLAN OF CARE
Problem: Potential for Falls  Goal: Patient will remain free of falls  Description: INTERVENTIONS:  - Educate patient/family on patient safety including physical limitations  - Instruct patient to call for assistance with activity   - Consult OT/PT to assist with strengthening/mobility   - Keep Call bell within reach  - Keep bed low and locked with side rails adjusted as appropriate  - Keep care items and personal belongings within reach  - Initiate and maintain comfort rounds  - Make Fall Risk Sign visible to staff  - Offer Toileting every 2 Hours, in advance of need  - Initiate/Maintain bed alarm  - Obtain necessary fall risk management equipment  - Apply yellow socks and bracelet for high fall risk patients  - Consider moving patient to room near nurses station  Outcome: Progressing     Problem: PAIN - ADULT  Goal: Verbalizes/displays adequate comfort level or baseline comfort level  Description: Interventions:  - Encourage patient to monitor pain and request assistance  - Assess pain using appropriate pain scale  - Administer analgesics based on type and severity of pain and evaluate response  - Implement non-pharmacological measures as appropriate and evaluate response  - Consider cultural and social influences on pain and pain management  - Notify physician/advanced practitioner if interventions unsuccessful or patient reports new pain  Outcome: Progressing     Problem: INFECTION - ADULT  Goal: Absence or prevention of progression during hospitalization  Description: INTERVENTIONS:  - Assess and monitor for signs and symptoms of infection  - Monitor lab/diagnostic results  - Monitor all insertion sites, i.e. indwelling lines, tubes, and drains  - Monitor endotracheal if appropriate and nasal secretions for changes in amount and color  - [place] appropriate cooling/warming therapies per order  - Administer medications as ordered  - Instruct and encourage patient and family to use good hand hygiene  technique  - Identify and instruct in appropriate isolation precautions for identified infection/condition  Outcome: Progressing  Goal: Absence of fever/infection during neutropenic period  Description: INTERVENTIONS:  - Monitor WBC    Outcome: Progressing     Problem: SAFETY ADULT  Goal: Patient will remain free of falls  Description: INTERVENTIONS:  - Educate patient/family on patient safety including physical limitations  - Instruct patient to call for assistance with activity   - Consult OT/PT to assist with strengthening/mobility   - Keep Call bell within reach  - Keep bed low and locked with side rails adjusted as appropriate  - Keep care items and personal belongings within reach  - Initiate and maintain comfort rounds  - Make Fall Risk Sign visible to staff  - Offer Toileting every 2 Hours, in advance of need  - Initiate/Maintain bed alarm  - Obtain necessary fall risk management equipment  - Apply yellow socks and bracelet for high fall risk patients  - Consider moving patient to room near nurses station  Outcome: Progressing  Goal: Maintain or return to baseline ADL function  Description: INTERVENTIONS:  - Educate patient/family on patient safety including physical limitations  - Instruct patient to call for assistance with activity   - Consult OT/PT to assist with strengthening/mobility   - Keep Call bell within reach  - Keep bed low and locked with side rails adjusted as appropriate  - Keep care items and personal belongings within reach  - Initiate and maintain comfort rounds  - Make Fall Risk Sign visible to staff  - Offer Toileting every 2 Hours, in advance of need  - Initiate/Maintain bed alarm  - Obtain necessary fall risk management equipment  - Apply yellow socks and bracelet for high fall risk patients  - Consider moving patient to room near nurses station  Outcome: Progressing  Goal: Maintains/Returns to pre admission functional level  Description: INTERVENTIONS:  - Perform AM-PAC 6 Click Basic  Mobility/ Daily Activity assessment daily.  - Set and communicate daily mobility goal to care team and patient/family/caregiver.   - Collaborate with rehabilitation services on mobility goals if consulted  - Perform Range of Motion 3 times a day.  - Reposition patient every 2 hours.  - Dangle patient 3 times a day  - Stand patient 3 times a day  - Ambulate patient 3 times a day  - Out of bed to chair 3 times a day   - Out of bed for meals 3 times a day  - Out of bed for toileting  - Record patient progress and toleration of activity level   Outcome: Progressing     Problem: DISCHARGE PLANNING  Goal: Discharge to home or other facility with appropriate resources  Description: INTERVENTIONS:  - Identify barriers to discharge w/patient and caregiver  - Arrange for needed discharge resources and transportation as appropriate  - Identify discharge learning needs (meds, wound care, etc.)  - Arrange for interpretive services to assist at discharge as needed  - Refer to Case Management Department for coordinating discharge planning if the patient needs post-hospital services based on physician/advanced practitioner order or complex needs related to functional status, cognitive ability, or social support system  Outcome: Progressing     Problem: Knowledge Deficit  Goal: Patient/family/caregiver demonstrates understanding of disease process, treatment plan, medications, and discharge instructions  Description: Complete learning assessment and assess knowledge base.  Interventions:  - Provide teaching at level of understanding  - Provide teaching via preferred learning methods  Outcome: Progressing

## 2024-01-30 NOTE — CASE MANAGEMENT
Case Management Assessment & Discharge Planning Note    Patient name Estella Laird  Location [facility] /[facility]4 -* MRN [id number]  : 1965 Date 2024       Current Admission Date: 2024  Current Admission Diagnosis:Atrial fibrillation (HCC)   Patient Active Problem List    Diagnosis Date Noted    Atrial fibrillation (HCC) 2024    Hypertension 2024    Intraductal papilloma 10/17/2023    Atypical ductal hyperplasia of left breast 10/17/2023    Family history of pancreatic cancer 10/17/2023    Dysphagia 2021    Chronic neck pain 2018    Abnormal EKG 2018    Seasonal allergic rhinitis 10/24/2016    Hypertriglyceridemia 2016    Family history of breast cancer 2015    Tobacco use disorder 2014    Atopic dermatitis and related condition 2013      LOS (days): 0  Geometric Mean LOS (GMLOS) (days):   Days to GMLOS:     OBJECTIVE:              Current admission status: Observation       Preferred Pharmacy:   [facility] [address] [place] [id number]  Phone: [phone] Fax: [phone]    Primary Care Provider: Myrna Rodriguez DO    Primary Insurance: COMMERCIAL MISCELLANEOUS  Secondary Insurance:     ASSESSMENT:  Active Health Care Proxies    There are no active Health Care Proxies on file.                                                      Housing Stability: Not on file   Food Insecurity: Not on file   Transportation Needs: Not on file   Utilities: Not on file       DISCHARGE DETAILS:    Discharge planning discussed with:: Pt  Freedom of Choice: Yes  Comments - Freedom of Choice: Pt will be discharging to home and verbalized understanding of follow up with PCP and cardiologist.  CM contacted family/caregiver?: No- see comments (Pt able to answer for herself.)  Were Treatment Team discharge recommendations reviewed with patient/caregiver?: Yes  Did patient/caregiver verbalize understanding of  patient care needs?: Yes  Were patient/caregiver advised of the risks associated with not following Treatment Team discharge recommendations?: Yes                             Treatment Team Recommendation: Home  Discharge Destination Plan:: Home  Transport at Discharge : Family                       Accompanied by: Family member              Additional Comments: Pt will be going home on Eliquis 5mg daily.  [facility] and co-pay will be $45 for 30 day supply.  Pt was made aware and also she was provided with the Eliquis 30 day free trial card. She verbalized understanding.  Plan will be to discharge home today and no other discharge needs noted.  Family present and will provide discharge to home.

## 2024-01-30 NOTE — ED PROVIDER NOTES
History  Chief Complaint   Patient presents with    Palpitations     Pt started with palpitations at approx 1100. Denies cp. Hx of anxiety, last panic attack approx 20 years ago, took half a xanax at approx 1130 without relief. Stressors at home. Denies cp. Also complaining of dizziness.      58-year-old female with a past medical history of breast cancer status postlumpectomy without radiation or chemotherapy, hypertension with multiple drug allergies, presenting in new onset A-fib. Patient has felt this sensation of palpitations similar to her presenting symptoms a couple times in the past, and has associated them with salty foods. Once avoiding salty foods, these sensations of palpitations and anxiety went away. They never lasted longer than a few minutes. However today, they started at around 11 AM, and have not stopped since they came on. Patient describes a sensation like her heart is beating too fast and feels generally anxious, thought she was having a panic attack. Patient additionally notes alcohol intake of more than 1 drink per night. Initial EKG showed a rate in the 180s, irregular, appearing as A-fib.        Prior to Admission Medications   Prescriptions Last Dose Informant Patient Reported? Taking?   ALPRAZolam (XANAX) 0.25 mg tablet  Self Yes Yes   Sig: Take 0.25 mg by mouth Three times daily as needed   Cholecalciferol (VITAMIN D3 PO)  Self Yes Yes   Sig: Take by mouth in the morning   Cyanocobalamin (VITAMIN B-12 PO)  Self Yes Yes   Sig: Take by mouth in the morning   Miconazole-Zinc Oxide-Petrolat 0.25-15-81.35 % OINT  Self Yes No   Sig: if needed (for rash on lips)   Omega-3 Fatty Acids (FISH OIL PO)  Self Yes No   Sig: Take by mouth in the morning   Probiotic Product (PROBIOTIC PO)  Self Yes No   Sig: Take by mouth in the morning   Pyridoxine HCl (B-6 PO)  Self Yes No   Sig: Take by mouth   clonazePAM (KlonoPIN) 0.5 mg tablet  Self Yes Yes   Sig: Take 0.5 mg by mouth daily at bedtime    famotidine (PEPCID) 40 MG tablet  Self No Yes   Sig: Take 1 tablet (40 mg total) by mouth 2 (two) times a day   Patient taking differently: Take 40 mg by mouth 2 (two) times a day as needed   ibuprofen (MOTRIN) 600 mg tablet  Self Yes No   Sig: TAKE ONE TABLET BY MOUTH EVERY 8 HOURS AS NEEDED FOR MILD PAIN   lidocaine (XYLOCAINE) 2 %  Self Yes No   Sig: lidocaine HCl 20 mg/mL (2 %) injection solution   administered   Patient not taking: Reported on 10/20/2023   meloxicam (MOBIC) 7.5 mg tablet  Self Yes No   Sig: meloxicam 7.5 mg tablet   TAKE ONE TABLET BY MOUTH EVERY DAY AS NEEDED   Patient not taking: Reported on 10/20/2023   methylPREDNISolone acetate (DEPO-Medrol) 40 mg/mL injection  Self Yes No   Sig: administered   Patient not taking: Reported on 10/20/2023   triamcinolone (KENALOG) 0.1 % cream  Self Yes No   Sig: APPLY TWICE DAILY TO AFFECTED AREAS AS DIRECTED   Patient not taking: Reported on 1/6/2024   valACYclovir (VALTREX) 1,000 mg tablet  Self Yes No   Sig: if needed   valsartan (DIOVAN) 80 mg tablet 1/29/2024 Self Yes Yes   Sig: Take 160 mg by mouth daily      Facility-Administered Medications: None       Past Medical History:   Diagnosis Date    Anxiety     Breast lump in female     left-  lumpectomy today 10/31/2023    Hypertension        Past Surgical History:   Procedure Laterality Date    AUGMENTATION MAMMAPLASTY Bilateral 2000    saline    BREAST BIOPSY Left 09/26/2023    BREAST LUMPECTOMY Left 10/31/2023    Procedure: LUMPECTOMY BREAST PELON  LOCALIZED;  Surgeon: Magalie Hubbard MD;  Location: [facility];  Service: Surgical Oncology    COLONOSCOPY      EGD  09/30/2021    Intrinsic moderate stenosis dilated with 54 [address] [place], erosive gastritis-biopsy negative for H. pylori by Dr. Michele    HYSTERECTOMY      age 51    US BREAST NEEDLE LOC LEFT Left 10/27/2023    US GUIDED BREAST BIOPSY LEFT COMPLETE Left 09/26/2023       Family History   Problem Relation Age of Onset    Breast cancer Mother 63         again at 71 years old.    No Known Problems Father     No Known Problems Daughter     Breast cancer Maternal Aunt 65    Pancreatic cancer Maternal Uncle         80's    No Known Problems Maternal Grandmother     No Known Problems Maternal Grandfather     No Known Problems Paternal Grandmother     No Known Problems Paternal Grandfather      I have reviewed and agree with the history as documented.    E-Cigarette/Vaping    E-Cigarette Use Never User      E-Cigarette/Vaping Substances     Social History     Tobacco Use    Smoking status: Former     Current packs/day: 0.00     Average packs/day: 0.3 packs/day for 20.0 years (5.0 ttl pk-yrs)     Types: Cigarettes     Start date: 2003     Quit date: 2023     Years since quittin.3    Smokeless tobacco: Never    Tobacco comments:     1 cig a day 10/2023 Quit   Vaping Use    Vaping status: Never Used   Substance Use Topics    Alcohol use: Yes     Alcohol/week: 7.0 standard drinks of alcohol     Types: 7 Glasses of wine per week     Comment: DAILY  1 GLASS WINE    Drug use: Never        Review of Systems   All other systems reviewed and are negative.      Physical Exam  ED Triage Vitals   Temperature Pulse Respirations Blood Pressure SpO2   24 1311 24 1307 24 1307 24 1307 24 1307   97.5 °F (36.4 °C) (!) 181 18 100/77 97 %      Temp Source Heart Rate Source Patient Position - Orthostatic VS BP Location FiO2 (%)   24 1311 24 1307 24 1345 24 1307 --   Oral Monitor Lying Left arm       Pain Score       24 1700       No Pain             Orthostatic Vital Signs  Vitals:    24 1530 24 1615 24 1705 24 1833   BP: 112/85 122/80 113/68 90/51   Pulse: (!) 127 (!) 132 100 63   Patient Position - Orthostatic VS:  Lying Sitting        Physical Exam  Vitals and nursing note reviewed.   Constitutional:       General: She is not in acute distress.     Appearance: She is well-developed.    HENT:      Head: Normocephalic and atraumatic.   Eyes:      Conjunctiva/sclera: Conjunctivae normal.   Cardiovascular:      Rate and Rhythm: Tachycardia present. Rhythm irregular.      Heart sounds: No murmur heard.  Pulmonary:      Effort: Pulmonary effort is normal. No respiratory distress.      Breath sounds: Normal breath sounds.   Abdominal:      Palpations: Abdomen is soft.      Tenderness: There is no abdominal tenderness.   Musculoskeletal:         General: No swelling.      Cervical back: Neck supple.   Skin:     General: Skin is warm and dry.      Capillary Refill: Capillary refill takes less than 2 seconds.   Neurological:      Mental Status: She is alert.   Psychiatric:         Mood and Affect: Mood normal.         ED Medications  Medications   sodium chloride (PF) 0.9 % injection 3 mL (has no administration in time range)   diltiazem (CARDIZEM) 125 mg in sodium chloride 0.9 % 125 mL infusion (0 mg/hr Intravenous Stopped 1/29/24 1833)   apixaban (ELIQUIS) tablet 5 mg (5 mg Oral Given 1/29/24 1519)   valsartan (DIOVAN) tablet 160 mg ( Oral Canceled Entry 1/30/24 0900)   diltiazem (CARDIZEM) tablet 30 mg (has no administration in time range)   diltiazem (CARDIZEM) injection 15 mg (15 mg Intravenous Given 1/29/24 1335)       Diagnostic Studies  Results Reviewed       Procedure Component Value Units Date/Time    HS Troponin I 4hr [[id number]]  (Abnormal) Collected: 01/29/24 1734    Lab Status: Final result Specimen: Blood from Arm, Right Updated: 01/29/24 1817     hs TnI 4hr 37 ng/L      Delta 4hr hsTnI 27 ng/L     HS Troponin I 2hr [[id number]]  (Normal) Collected: 01/29/24 1518    Lab Status: Final result Specimen: Blood from Arm, Left Updated: 01/29/24 1559     hs TnI 2hr 25 ng/L      Delta 2hr hsTnI 15 ng/L     TSH, 3rd generation [[id number]]  (Normal) Collected: 01/29/24 1320    Lab Status: Final result Specimen: Blood from Arm, Right Updated: 01/29/24 1409     TSH 3RD GENERATON 2.674 uIU/mL      Comprehensive metabolic panel [[id number]] Collected: 01/29/24 1320    Lab Status: Final result Specimen: Blood from Arm, Right Updated: 01/29/24 1400     Sodium 141 mmol/L      Potassium 4.1 mmol/L      Chloride 106 mmol/L      CO2 27 mmol/L      ANION GAP 8 mmol/L      BUN 20 mg/dL      Creatinine 0.83 mg/dL      Glucose 109 mg/dL      Calcium 9.5 mg/dL      AST 21 U/L      ALT 13 U/L      Alkaline Phosphatase 59 U/L      Total Protein 7.1 g/dL      Albumin 4.5 g/dL      Total Bilirubin 0.52 mg/dL      eGFR 77 ml/min/1.73sq m     Narrative:      National Kidney Disease Foundation guidelines for Chronic Kidney Disease (CKD):     Stage 1 with normal or high GFR (GFR > 90 mL/min/1.73 square meters)    Stage 2 Mild CKD (GFR = 60-89 mL/min/1.73 square meters)    Stage 3A Moderate CKD (GFR = 45-59 mL/min/1.73 square meters)    Stage 3B Moderate CKD (GFR = 30-44 mL/min/1.73 square meters)    Stage 4 Severe CKD (GFR = 15-29 mL/min/1.73 square meters)    Stage 5 End Stage CKD (GFR <15 mL/min/1.73 square meters)  Note: GFR calculation is accurate only with a steady state creatinine    Magnesium [[id number]]  (Normal) Collected: 01/29/24 1320    Lab Status: Final result Specimen: Blood from Arm, Right Updated: 01/29/24 1400     Magnesium 2.2 mg/dL     B-Type Natriuretic Peptide(BNP) [[id number]]  (Normal) Collected: 01/29/24 1320    Lab Status: Final result Specimen: Blood from Arm, Right Updated: 01/29/24 1358     BNP 22 pg/mL     HS Troponin 0hr (reflex protocol) [[id number]]  (Normal) Collected: 01/29/24 1320    Lab Status: Final result Specimen: Blood from Arm, Right Updated: 01/29/24 1353     hs TnI 0hr 10 ng/L     CBC and differential [[id number]]  (Abnormal) Collected: 01/29/24 1320    Lab Status: Final result Specimen: Blood from Arm, Right Updated: 01/29/24 1329     WBC 11.46 Thousand/uL      RBC 5.10 Million/uL      Hemoglobin 15.9 g/dL      Hematocrit 47.8 %      MCV 94 fL      MCH 31.2 pg      MCHC 33.3 g/dL      RDW  12.5 %      MPV 10.1 fL      Platelets 488 Thousands/uL      nRBC 0 /100 WBCs      Neutrophils Relative 59 %      Immat GRANS % 0 %      Lymphocytes Relative 26 %      Monocytes Relative 9 %      Eosinophils Relative 5 %      Basophils Relative 1 %      Neutrophils Absolute 6.72 Thousands/µL      Immature Grans Absolute 0.03 Thousand/uL      Lymphocytes Absolute 3.02 Thousands/µL      Monocytes Absolute 1.01 Thousand/µL      Eosinophils Absolute 0.59 Thousand/µL      Basophils Absolute 0.09 Thousands/µL                    X-ray chest 1 view portable    (Results Pending)         Procedures  ECG 12 Lead Documentation Only    Date/Time: 1/29/2024 1:18 PM    Performed by: Tee Ruffin MD  Authorized by: Tee Ruffin MD    Patient location:  ED  Interpretation:     Interpretation: abnormal    Rate:     ECG rate assessment: tachycardic    Rhythm:     Rhythm: atrial fibrillation    Ectopy:     Ectopy: none    QRS:     QRS axis:  Normal    QRS intervals:  Normal  Conduction:     Conduction: normal    ST segments:     ST segments:  Normal  T waves:     T waves: normal          ED Course       [id number] SCORE      Flowsheet Row Most Recent Value   [id number]    Age 0 Filed at: 01/29/2024 1345   Sex 1 Filed at: 01/29/2024 1518   CHF History 0 Filed at: 01/29/2024 1518   HTN History 1 Filed at: 01/29/2024 1518   Stroke or TIA Symptoms Previously 0 Filed at: 01/29/2024 1518   Vascular Disease History 0 Filed at: 01/29/2024 1518   Diabetes History 0 Filed at: 01/29/2024 1518   [id number] Score 2 Filed at: 01/29/2024 1518                                        Medical Decision Making  Patient presented in atrial fibrillation with rapid ventricular response.  Patient seems to know when she goes into these episodes, and states that this episode only began a couple hours ago.  EKG showed a rate in the 180s, irregular, appearing as A-fib. Spoke with cardiology about different management options, including  cardioversion. Patient's [id number] score was 2, prompting cardiology to recommend inpatient admission and anticoagulation as patient was at high risk for relapsing into A-fib if cardioverted and discharged.  Broad laboratory workup initiated for different causes of atrial fibrillation, including but not limited to endocrine, metabolic, anemic, ACS.  Workup largely negative, patient's A-fib likely due to hypertension and possible alcohol overuse.  Patient rate controlled with Cardizem drip improvement of her symptoms.  Patient will be admitted with cardiology consult.    Amount and/or Complexity of Data Reviewed  Labs: ordered.    Risk  Prescription drug management.  Decision regarding hospitalization.          Disposition  Final diagnoses:   Atrial fibrillation (HCC)     Time reflects when diagnosis was documented in both MDM as applicable and the Disposition within this note       Time User Action Codes Description Comment    1/29/2024  1:43 PM Shady Ospina Add [I48.91] Atrial fibrillation (HCC)           ED Disposition       ED Disposition   Admit    Condition   Stable    Date/Time   Mon Jan 29, 2024 7006    Comment   Case was discussed with Dr Nguyen and the patient's admission status was agreed to be Admission Status: inpatient status to the service of Dr. Nguyen .               Follow-up Information    None         Current Discharge Medication List        CONTINUE these medications which have NOT CHANGED    Details   ALPRAZolam (XANAX) 0.25 mg tablet Take 0.25 mg by mouth Three times daily as needed      Cholecalciferol (VITAMIN D3 PO) Take by mouth in the morning      clonazePAM (KlonoPIN) 0.5 mg tablet Take 0.5 mg by mouth daily at bedtime      Cyanocobalamin (VITAMIN B-12 PO) Take by mouth in the morning      famotidine (PEPCID) 40 MG tablet Take 1 tablet (40 mg total) by mouth 2 (two) times a day  Qty: 60 tablet, Refills: 5    Associated Diagnoses: Oropharyngeal dysphagia; Esophageal stenosis;  Gastroesophageal reflux disease, unspecified whether esophagitis present      valsartan (DIOVAN) 80 mg tablet Take 160 mg by mouth daily      ibuprofen (MOTRIN) 600 mg tablet TAKE ONE TABLET BY MOUTH EVERY 8 HOURS AS NEEDED FOR MILD PAIN      lidocaine (XYLOCAINE) 2 % lidocaine HCl 20 mg/mL (2 %) injection solution   administered      meloxicam (MOBIC) 7.5 mg tablet meloxicam 7.5 mg tablet   TAKE ONE TABLET BY MOUTH EVERY DAY AS NEEDED      methylPREDNISolone acetate (DEPO-Medrol) 40 mg/mL injection administered      Miconazole-Zinc Oxide-Petrolat 0.25-15-81.35 % OINT if needed (for rash on lips)      Omega-3 Fatty Acids (FISH OIL PO) Take by mouth in the morning      Probiotic Product (PROBIOTIC PO) Take by mouth in the morning      Pyridoxine HCl (B-6 PO) Take by mouth      triamcinolone (KENALOG) 0.1 % cream APPLY TWICE DAILY TO AFFECTED AREAS AS DIRECTED      valACYclovir (VALTREX) 1,000 mg tablet if needed           No discharge procedures on file.    PDMP Review       None             ED Provider  Attending physically available and evaluated Estella Laird. I managed the patient along with the ED Attending.    Electronically Signed by           Tee Ruffin MD  01/29/24 5413

## 2024-01-30 NOTE — DISCHARGE INSTR - AVS FIRST PAGE
Dear Estella Laird,     It was our pleasure to care for you here at [facility].  It is our hope that we were always able to exceed the expected standards for your care during your stay.  You were hospitalized due to atrial fibrillation.  You were cared for on the 4th floor under the service of Kam Aranda PA-C with the [facility] who covers for your primary care physician (PCP), Myrna Rodriguez DO, while you were hospitalized.  If you have any questions or concerns related to this hospitalization, you may contact us at .  For follow up as well as medication refills, we recommend that you follow up with your primary care physician.  A registered nurse will reach out to you by phone within a few days after your discharge to answer any additional questions that you may have after going home.  However, at this time we provide for you here, the most important instructions / recommendations at discharge:     Notable Medication Adjustments -   Take diltiazem (Cardizem) 120 mg daily by mouth.  Take apixaban (Eliquis) 5 mg twice daily by mouth.  Stop taking valsartan (Diovan) altogether.  Testing Required after Discharge -   None  Incidental findings that Require Follow Up   None  Important Follow Up Information -   Follow up in the outpatient setting with your PCP.  Follow up in the outpatient setting with the cardiology team.  Other Instructions -   Please return to the hospital if you experience any chest pain, chest pressure, palpitations, shortness of breath, passing out, lightheadedness, or dizziness.  Please review this entire after visit summary as additional general instructions including medication list, appointments, activity, diet, any pertinent wound care, and other additional recommendations from your care team that may be provided for you.      Sincerely,     Kam Aranda PA-C

## 2024-01-30 NOTE — DISCHARGE SUMMARY
[facility]  Discharge- Estella Laird 1965, 58 y.o. female MRN: [id number]  Unit/Bed#: E4 -01 Encounter: [id number]  Primary Care Provider: Myrna Rodriguez DO   Date and time admitted to hospital: 1/29/2024  1:00 PM    * Atrial fibrillation (HCC)  Assessment & Plan  Resenting with atrial fibrillation with rapid ventricular response  Did take Sudafed for upper respiratory infection -possible etiology of her A-fib  Denies any excessive caffeine consumption  Started on diltiazem drip while in the ED, now with improved rates  Switched to oral diltiazem 120 mg daily  TSH and free T4 WNL  Echocardiogram with mild concentric hypertrophy, EF 60%, normal sized atria.  Cardiology consulted  MARIAH cardioversion initially scheduled however patient converted to NSR prior to procedure  [id number]: 2  Initiated on Eliquis 5 mg BID, continue    Hypertension  Assessment & Plan  Prior to admission on valsartan  Discontinue valsartan    Intraductal papilloma  Assessment & Plan  History of breast cancer, not on any active treatment  Scheduled for OP follow up with MRI      Medical Problems       Resolved Problems  Date Reviewed: 1/30/2024   None       Discharging Physician / Practitioner: Kam Aranda PA-C  PCP: Myrna Rodriguez DO  Admission Date:   Admission Orders (From admission, onward)       Ordered        01/29/24 1437  Place in Observation  Once                          Discharge Date: 01/30/24    Consultations During Hospital Stay:  Cardiology    Procedures Performed:   None    Significant Findings / Test Results:   Cardioversion    Result Date: 1/30/2024  Narrative: This procedure could not be completed and was aborted.    Echo complete w/ contrast if indicated  Result Date: 1/30/2024    Narrative: Technically difficult but adequate transthoracic echocardiogram study limited by body habitus and lung interference.  Patient noted to be in atrial fibrillation rhythm. Mild concentric left ventricle  hypertrophy, normal left trickle systolic function, indeterminate diastolic function.  Left ventricular ejection fraction is estimated around 60%. Normal right ventricle size and systolic function. Normal left and right atrial cavity size. Mild aortic valvular sclerosis, no aortic valve stenosis or regurgitation. Mitral valve leaflet sclerosis, trace mitral valve regurgitation. Trace tricuspid valve regurgitation. No obvious pulmonary hypertension. No pericardial effusion. Normal aortic root size. No previous echocardiogram is available for comparison.     X-ray chest 1 view portable  Result Date: 1/30/2024    Impression: No acute disease in the chest. Workstation performed: [id number]      Incidental Findings:   None       Test Results Pending at Discharge (will require follow up):   None     Outpatient Tests Requested:  None    Complications:  None    Reason for Admission: A fib    Hospital Course:   Estella Laird is a 58 y.o. female patient with a past medical history of hypertension who originally presented to the hospital on 1/29/2024 due to palpitations. She initially thought it was due to a panic attack and took a xanax without improvement. It continued throughout tthe day and the patent ultimately came to the ED for further evaluation. Upon arrival to the D, the patient was found t be in atrial fibrillation with RVR. She was subsequently started on an Cardizem gtt which was slowly titrated off during her stay once initiated on oral diltiazem. She was seen in consultation by cardiology who recommended MARIAH cardioversion be performed on day of discharge however patient spontaneously converted to NSR without the need for the procedure. She was discharged home in stable condition in 1/30/24 on eliquis and Cardizem and will follow up in the OP setting with cardiology.    Please see above list of diagnoses and related plan for additional information.     Condition at Discharge: stable    Discharge Day Visit / Exam:    * Please refer to separate progress note for these details *    Discussion with Family: Updated  ( and daughter) at bedside.    Discharge instructions/Information to patient and family:   See after visit summary for information provided to patient and family.      Provisions for Follow-Up Care:  See after visit summary for information related to follow-up care and any pertinent home health orders.      Mobility at time of Discharge:   Basic Mobility Inpatient Raw Score: 24  JH-HLM Goal: 8: Walk 250 feet or more  JH-HLM Achieved: 7: Walk 25 feet or more  HLM Goal achieved. Continue to encourage appropriate mobility.     Disposition:   Home    Planned Readmission: n/a     Discharge Statement:  I spent 45 minutes discharging the patient. This time was spent on the day of discharge. I had direct contact with the patient on the day of discharge. Greater than 50% of the total time was spent examining patient, answering all patient questions, arranging and discussing plan of care with patient as well as directly providing post-discharge instructions.  Additional time then spent on discharge activities.    Discharge Medications:  See after visit summary for reconciled discharge medications provided to patient and/or family.      **Please Note: This note may have been constructed using a voice recognition system**

## 2024-01-30 NOTE — ASSESSMENT & PLAN NOTE
Resenting with atrial fibrillation with rapid ventricular response  Did take Sudafed for upper respiratory infection -possible etiology of her A-fib  Denies any excessive caffeine consumption  Started on diltiazem drip while in the ED, now with improved rates  Switched to oral diltiazem 120 mg daily  TSH and free T4 WNL  Echocardiogram with mild concentric hypertrophy, EF 60%, normal sized atria.  Cardiology consulted  MARIAH cardioversion initially scheduled however patient converted to NSR prior to procedure  [id number]: 2  Initiated on Eliquis 5 mg BID, continue

## 2024-01-30 NOTE — ASSESSMENT & PLAN NOTE
Prior to admission on valsartan which patient would like to continue  Hold valsartan for the time being

## 2024-01-30 NOTE — PLAN OF CARE
Problem: Potential for Falls  Goal: Patient will remain free of falls  Description: INTERVENTIONS:  - Educate patient/family on patient safety including physical limitations  - Instruct patient to call for assistance with activity   - Consult OT/PT to assist with strengthening/mobility   - Keep Call bell within reach  - Keep bed low and locked with side rails adjusted as appropriate  - Keep care items and personal belongings within reach  - Initiate and maintain comfort rounds  - Make Fall Risk Sign visible to staff  - Offer Toileting every 2 Hours, in advance of need  - Initiate/Maintain bed alarm  - Obtain necessary fall risk management equipment  - Apply yellow socks and bracelet for high fall risk patients  - Consider moving patient to room near nurses station  1/30/2024 1548 by Oralia Guillen  Outcome: Adequate for Discharge  1/30/2024 0914 by Oralia Guillen  Outcome: Progressing     Problem: PAIN - ADULT  Goal: Verbalizes/displays adequate comfort level or baseline comfort level  Description: Interventions:  - Encourage patient to monitor pain and request assistance  - Assess pain using appropriate pain scale  - Administer analgesics based on type and severity of pain and evaluate response  - Implement non-pharmacological measures as appropriate and evaluate response  - Consider cultural and social influences on pain and pain management  - Notify physician/advanced practitioner if interventions unsuccessful or patient reports new pain  1/30/2024 1548 by Oralia Guillen  Outcome: Adequate for Discharge  1/30/2024 0914 by Oralia Guillen  Outcome: Progressing     Problem: INFECTION - ADULT  Goal: Absence or prevention of progression during hospitalization  Description: INTERVENTIONS:  - Assess and monitor for signs and symptoms of infection  - Monitor lab/diagnostic results  - Monitor all insertion sites, i.e. indwelling lines, tubes, and drains  - Monitor endotracheal if appropriate and nasal secretions for  changes in amount and color  - [place] appropriate cooling/warming therapies per order  - Administer medications as ordered  - Instruct and encourage patient and family to use good hand hygiene technique  - Identify and instruct in appropriate isolation precautions for identified infection/condition  1/30/2024 1548 by Oralia Guillen  Outcome: Adequate for Discharge  1/30/2024 0914 by Oralia Guillen  Outcome: Progressing  Goal: Absence of fever/infection during neutropenic period  Description: INTERVENTIONS:  - Monitor WBC    1/30/2024 1548 by Oralia Guillen  Outcome: Adequate for Discharge  1/30/2024 0914 by Oralia Guillen  Outcome: Progressing     Problem: SAFETY ADULT  Goal: Patient will remain free of falls  Description: INTERVENTIONS:  - Educate patient/family on patient safety including physical limitations  - Instruct patient to call for assistance with activity   - Consult OT/PT to assist with strengthening/mobility   - Keep Call bell within reach  - Keep bed low and locked with side rails adjusted as appropriate  - Keep care items and personal belongings within reach  - Initiate and maintain comfort rounds  - Make Fall Risk Sign visible to staff  - Offer Toileting every 2 Hours, in advance of need  - Initiate/Maintain bed alarm  - Obtain necessary fall risk management equipment  - Apply yellow socks and bracelet for high fall risk patients  - Consider moving patient to room near nurses station  1/30/2024 1548 by Oralia Guillen  Outcome: Adequate for Discharge  1/30/2024 0914 by Oralia Guillen  Outcome: Progressing  Goal: Maintain or return to baseline ADL function  Description: INTERVENTIONS:  - Educate patient/family on patient safety including physical limitations  - Instruct patient to call for assistance with activity   - Consult OT/PT to assist with strengthening/mobility   - Keep Call bell within reach  - Keep bed low and locked with side rails adjusted as appropriate  - Keep care items and  personal belongings within reach  - Initiate and maintain comfort rounds  - Make Fall Risk Sign visible to staff  - Offer Toileting every 2 Hours, in advance of need  - Initiate/Maintain bed alarm  - Obtain necessary fall risk management equipment  - Apply yellow socks and bracelet for high fall risk patients  - Consider moving patient to room near nurses station  1/30/2024 1548 by Oralia Guillen  Outcome: Adequate for Discharge  1/30/2024 0914 by Oralia Guillen  Outcome: Progressing  Goal: Maintains/Returns to pre admission functional level  Description: INTERVENTIONS:  - Perform AM-PAC 6 Click Basic Mobility/ Daily Activity assessment daily.  - Set and communicate daily mobility goal to care team and patient/family/caregiver.   - Collaborate with rehabilitation services on mobility goals if consulted  - Perform Range of Motion 3 times a day.  - Reposition patient every 2 hours.  - Dangle patient 3 times a day  - Stand patient 3 times a day  - Ambulate patient 3 times a day  - Out of bed to chair 3 times a day   - Out of bed for meals 3 times a day  - Out of bed for toileting  - Record patient progress and toleration of activity level   1/30/2024 1548 by Oralia Guillen  Outcome: Adequate for Discharge  1/30/2024 0914 by Oralia Guillen  Outcome: Progressing     Problem: DISCHARGE PLANNING  Goal: Discharge to home or other facility with appropriate resources  Description: INTERVENTIONS:  - Identify barriers to discharge w/patient and caregiver  - Arrange for needed discharge resources and transportation as appropriate  - Identify discharge learning needs (meds, wound care, etc.)  - Arrange for interpretive services to assist at discharge as needed  - Refer to Case Management Department for coordinating discharge planning if the patient needs post-hospital services based on physician/advanced practitioner order or complex needs related to functional status, cognitive ability, or social support system  1/30/2024  1548 by Oralia Guillen  Outcome: Adequate for Discharge  1/30/2024 0914 by Oralia Guillen  Outcome: Progressing     Problem: Knowledge Deficit  Goal: Patient/family/caregiver demonstrates understanding of disease process, treatment plan, medications, and discharge instructions  Description: Complete learning assessment and assess knowledge base.  Interventions:  - Provide teaching at level of understanding  - Provide teaching via preferred learning methods  1/30/2024 1548 by Oralia Guillen  Outcome: Adequate for Discharge  1/30/2024 0914 by Oralia Guillen  Outcome: Progressing

## 2024-02-02 ENCOUNTER — TELEPHONE (OUTPATIENT)
Dept: CARDIOLOGY CLINIC | Facility: CLINIC | Age: 59
End: 2024-02-02

## 2024-02-02 DIAGNOSIS — I48.0 PAROXYSMAL ATRIAL FIBRILLATION (HCC): Primary | ICD-10-CM

## 2024-02-02 RX ORDER — PROPRANOLOL HCL 60 MG
60 CAPSULE, EXTENDED RELEASE 24HR ORAL DAILY
Qty: 30 CAPSULE | Refills: 1 | Status: SHIPPED | OUTPATIENT
Start: 2024-02-02 | End: 2024-02-07

## 2024-02-02 NOTE — PROGRESS NOTES
Patient reports developing diarrhea as well as shakiness and fatigue on the Cardizem.  Had long discussion with the patient over the phone.  Plan to stop Cardizem and try propranolol 60 mg daily instead.  If she does not do well on the propranolol, can consider changing to short acting Cardizem or stopping the AV fidencio blocking agents and just changing back to valsartan for blood pressure control.

## 2024-02-02 NOTE — TELEPHONE ENCOUNTER
Pt calling just D/C from [facility] with at fib dx.  She was d/c on cardiazem 120 mg and states is not tolerating the medication...She is experiencing persistent diarrhea, states her HR is less than 120.  She states she feels unable to regulate her body temp.  Please advise. She was on valsartan prior to hospital stay and states did not tolerate the metoprolol in the hospital.  Thanks.

## 2024-02-07 ENCOUNTER — TELEPHONE (OUTPATIENT)
Dept: CARDIOLOGY CLINIC | Facility: CLINIC | Age: 59
End: 2024-02-07

## 2024-02-07 DIAGNOSIS — I48.0 PAROXYSMAL ATRIAL FIBRILLATION (HCC): Primary | ICD-10-CM

## 2024-02-07 RX ORDER — BISOPROLOL FUMARATE 5 MG/1
2.5 TABLET, FILM COATED ORAL DAILY
Qty: 45 TABLET | Refills: 1 | Status: SHIPPED | OUTPATIENT
Start: 2024-02-07

## 2024-02-07 NOTE — TELEPHONE ENCOUNTER
Pt called with response from Dr Capps.  Lm for patient with his recommendation the patient returned call her medication is in capsule form.  Please advise...She can not half the capsule.

## 2024-02-07 NOTE — TELEPHONE ENCOUNTER
Unable to cut propranolol in half.  Will instead try bisoprolol 2.5 mg daily.  Sent new Rx for this medication.

## 2024-02-07 NOTE — TELEPHONE ENCOUNTER
Pt calling stating still having diarrhea on propanolol she has started not as bad as the previous medication.  Pt states she feels dehydrated cannot eat or drink and has headache.  Pt states very sensative to medications. Please advise

## 2024-02-08 NOTE — TELEPHONE ENCOUNTER
Pt called with instructions for new rx  Pt in agreement with  trying new rx. Pt will wait a day or two once she knows diarrhea has stopped.

## 2024-02-12 ENCOUNTER — TELEPHONE (OUTPATIENT)
Dept: CARDIOLOGY CLINIC | Facility: CLINIC | Age: 59
End: 2024-02-12

## 2024-02-12 ENCOUNTER — TELEPHONE (OUTPATIENT)
Dept: HEMATOLOGY ONCOLOGY | Facility: CLINIC | Age: 59
End: 2024-02-12

## 2024-02-12 NOTE — TELEPHONE ENCOUNTER
Pt called states had blood on toliet paper when wiping. She was concerned about it because on Eliquis.  This was new for her.She has no GI upset, no dark tarry stools, She did have issues with diarrhea with previous medications, she feels may be irritation from same.  Told her agreed and she will watch if any changes. Denies any severe bleeding.  She states the new medication is going well so far. And also stated has MRI scheduled and was concerned about the Eliquis with contrast..but told by MRI dept not an issue.  Has OV next week with Dr Capps.

## 2024-02-12 NOTE — TELEPHONE ENCOUNTER
Call Transfer   Who are you speaking with?  Patient   If it is not the patient, are they listed on an active communication consent form? N/A   Who is the patients HemOnc/SurgOnc provider? Dr. Hubbard   What is the reason for this call? Reschedule mri   Person/Department that the call was transferred to?    Time that call was transferred?    central scheduling   Your call will be transferred now. If you receive a voicemail, please leave a detailed message and a member of the team will return your call as soon as possible.    Did you relay this information to the caller?  Yes

## 2024-02-16 ENCOUNTER — TELEPHONE (OUTPATIENT)
Dept: SURGICAL ONCOLOGY | Facility: CLINIC | Age: 59
End: 2024-02-16

## 2024-02-16 ENCOUNTER — TELEPHONE (OUTPATIENT)
Dept: CARDIOLOGY CLINIC | Facility: CLINIC | Age: 59
End: 2024-02-16

## 2024-02-16 NOTE — TELEPHONE ENCOUNTER
Called central scheduling and soonest available for an MRI appt is 3/5/24 at 11:00AM in [place]. I left message for patient and if she wants appt changed left Central Scheduling's phone number.

## 2024-02-16 NOTE — TELEPHONE ENCOUNTER
Pt calling feeling she is having issues with new medication the Bisoprolol 2.5 mg daily.  She states feels SOB(stated going up steps feels SOB)  and dyhydrated. Has been drinking gatorade . She feels is not a betablocker canidate. Has OV on Monday with Dr cervantes to discuss will hold over the weekend and discuss at OV on Monday understands that if would have any rapid heart rates to report to ED.

## 2024-02-16 NOTE — TELEPHONE ENCOUNTER
----- Message from Trinidad Stover RN sent at 2/14/2024 10:00 AM EST -----  Regarding: FW: Breast MRI  Contact: [phone]    ----- Message -----  From: Estella Laird  Sent: 2/14/2024   9:42 AM EST  To: Surgical Oncology Clinical  Subject: Breast MRI                                       Hello, I am following up about possibly getting an earlier date for my MRI? Thank you, Estella Laird

## 2024-02-19 ENCOUNTER — OFFICE VISIT (OUTPATIENT)
Dept: CARDIOLOGY CLINIC | Facility: CLINIC | Age: 59
End: 2024-02-19
Payer: COMMERCIAL

## 2024-02-19 VITALS
BODY MASS INDEX: 22.5 KG/M2 | HEIGHT: 63 IN | SYSTOLIC BLOOD PRESSURE: 130 MMHG | HEART RATE: 66 BPM | DIASTOLIC BLOOD PRESSURE: 80 MMHG | WEIGHT: 127 LBS

## 2024-02-19 DIAGNOSIS — I48.91 ATRIAL FIBRILLATION (HCC): ICD-10-CM

## 2024-02-19 DIAGNOSIS — E78.1 HYPERTRIGLYCERIDEMIA: ICD-10-CM

## 2024-02-19 DIAGNOSIS — I48.0 PAROXYSMAL ATRIAL FIBRILLATION (HCC): Primary | ICD-10-CM

## 2024-02-19 DIAGNOSIS — I10 PRIMARY HYPERTENSION: ICD-10-CM

## 2024-02-19 DIAGNOSIS — F17.200 TOBACCO USE DISORDER: ICD-10-CM

## 2024-02-19 PROCEDURE — 99214 OFFICE O/P EST MOD 30 MIN: CPT | Performed by: STUDENT IN AN ORGANIZED HEALTH CARE EDUCATION/TRAINING PROGRAM

## 2024-02-19 PROCEDURE — 93000 ELECTROCARDIOGRAM COMPLETE: CPT | Performed by: STUDENT IN AN ORGANIZED HEALTH CARE EDUCATION/TRAINING PROGRAM

## 2024-02-19 RX ORDER — CLOBETASOL PROPIONATE 0.5 MG/G
OINTMENT TOPICAL
COMMUNITY
Start: 2024-01-04

## 2024-02-19 NOTE — PROGRESS NOTES
Cardiology Consultation     Estella Laird  [id number]  1965  [facility] [place]  [address] [facility] [address]      1. Paroxysmal atrial fibrillation (HCC)  POCT ECG    AMB extended holter monitor      2. Primary hypertension        3. Hypertriglyceridemia        4. Tobacco use disorder        5. Atrial fibrillation (HCC)  Ambulatory referral to Cardiology    apixaban (ELIQUIS) 5 mg          Discussion/Summary:  Paroxysmal atrial fibrillation  -Diagnosed during hospitalization at the end of January 2024  - Anticoagulated on Eliquis 5 mg twice daily  - Patient spontaneously converted back to sinus rhythm prior to her MARIAH cardioversion  - Initially rate controlled with diltiazem 120 mg daily, but patient reported developing diarrhea as well as shakiness and fatigue on Cardizem, so changed to propranolol 60 mg daily  - Patient had diarrhea on propranolol as well, so changed to bisoprolol 2.5 mg daily  Hypertension  - Previously on valsartan 160 mg daily  - Continue with bisoprolol 2.5 mg   - BP well controlled today  Hyperlipidemia  - Not on statin currently  - Lipid profile 6/29/2023 showed total cholesterol 165, triglycerides 318, HDL 56, LDL 45  Former tobacco use  - Quit in Sept 2023  Alcohol use  - Drinks about 2 glasses of wine each night with dinner  Anxiety  History of breast cancer    History of Present Illness:  Estella Laird is a 58 y.o. year old female with a past medical history of paroxysmal atrial fibrillation, hypertension, hyperlipidemia, history of breast cancer, anxiety, tobacco use, and alcohol use.  She reports feeling okay today.  She has been having some panic attacks recently.  Reports feeling some palpitations with panic attacks, but denies feeling any recurrent atrial fibrillation recently.  She is doing okay on the bisoprolol, but reports feeling of fatigue/crushing sensation about 2 hours after taking the dose.  She is going to try taking it before going to bed to  see if it helps with her symptoms.    Patient Active Problem List   Diagnosis    Tobacco use disorder    Seasonal allergic rhinitis    Hypertriglyceridemia    Family history of breast cancer    Atopic dermatitis and related condition    Chronic neck pain    Abnormal EKG    Dysphagia    Intraductal papilloma    Atypical ductal hyperplasia of left breast    Family history of pancreatic cancer    Atrial fibrillation (HCC)    Hypertension     Past Medical History:   Diagnosis Date    Anxiety     Breast lump in female     left-  lumpectomy today 10/31/2023    Hypertension      Social History     Socioeconomic History    Marital status: /Civil Union     Spouse name: Not on file    Number of children: Not on file    Years of education: Not on file    Highest education level: Not on file   Occupational History    Occupation: [employer]   Tobacco Use    Smoking status: Former     Current packs/day: 0.00     Average packs/day: 0.3 packs/day for 20.0 years (5.0 ttl pk-yrs)     Types: Cigarettes     Start date: 2003     Quit date: 2023     Years since quittin.4    Smokeless tobacco: Never    Tobacco comments:     1 cig a day 10/2023 Quit   Vaping Use    Vaping status: Never Used   Substance and Sexual Activity    Alcohol use: Yes     Alcohol/week: 7.0 standard drinks of alcohol     Types: 7 Glasses of wine per week     Comment: DAILY  1 GLASS WINE    Drug use: Never    Sexual activity: Not on file   Other Topics Concern    Not on file   Social History Narrative    Not on file     Social Determinants of Health     Financial Resource Strain: Not on file   Food Insecurity: Not on file   Transportation Needs: Not on file   Physical Activity: Not on file   Stress: Not on file   Social Connections: Unknown (12/3/2023)    [facility]    Social Connection and Isolation Panel [NHANES]     Frequency of Communication with Friends and Family: Not on file     Frequency of Social  Gatherings with Friends and Family: Not on file     Attends Restorationist Services: Not on file     Active Member of Clubs or Organizations: Not on file     Attends Club or Organization Meetings: Not on file     Marital Status:    Intimate Partner Violence: Not At Risk (12/3/2023)    Received from [facility]    Humiliation, Afraid, Rape, and Kick questionnaire     Fear of Current or Ex-Partner: No     Emotionally Abused: No     Physically Abused: No     Sexually Abused: No   Housing Stability: Not on file      Family History   Problem Relation Age of Onset    Breast cancer Mother 63        again at 71 years old.    No Known Problems Father     No Known Problems Daughter     Breast cancer Maternal Aunt 65    Pancreatic cancer Maternal Uncle         80's    No Known Problems Maternal Grandmother     No Known Problems Maternal Grandfather     No Known Problems Paternal Grandmother     No Known Problems Paternal Grandfather      Past Surgical History:   Procedure Laterality Date    AUGMENTATION MAMMAPLASTY Bilateral 2000    saline    BREAST BIOPSY Left 09/26/2023    BREAST LUMPECTOMY Left 10/31/2023    Procedure: LUMPECTOMY BREAST PELON  LOCALIZED;  Surgeon: Magalie Hubbard MD;  Location: [facility];  Service: Surgical Oncology    COLONOSCOPY      EGD  09/30/2021    Intrinsic moderate stenosis dilated with 54 [address], erosive gastritis-biopsy negative for H. pylori by Dr. Michele    HYSTERECTOMY      age 51    US BREAST NEEDLE LOC LEFT Left 10/27/2023    US GUIDED BREAST BIOPSY LEFT COMPLETE Left 09/26/2023       Current Outpatient Medications:     ALPRAZolam (XANAX) 0.25 mg tablet, Take 0.25 mg by mouth Three times daily as needed, Disp: , Rfl:     apixaban (ELIQUIS) 5 mg, Take 1 tablet (5 mg total) by mouth 2 (two) times a day, Disp: 60 tablet, Rfl: 5    bisoprolol (ZEBETA) 5 mg tablet, Take 0.5 tablets (2.5 mg total) by mouth daily, Disp: 45 tablet, Rfl: 1    clobetasol (TEMOVATE) 0.05 %  ointment, APPLY TWICE A DAY TO AFFECTED AREAS ON HANDS FOR 2 WEEKS THEN DAILY FOR 2 WEEKS THEN 3 TIMES A WEEK AS NEEDED, Disp: , Rfl:     clonazePAM (KlonoPIN) 0.5 mg tablet, Take 0.5 mg by mouth daily at bedtime, Disp: , Rfl:     famotidine (PEPCID) 40 MG tablet, Take 1 tablet (40 mg total) by mouth 2 (two) times a day (Patient taking differently: Take 40 mg by mouth 2 (two) times a day as needed), Disp: 60 tablet, Rfl: 5    Miconazole-Zinc Oxide-Petrolat 0.25-15-81.35 % OINT, if needed (for rash on lips), Disp: , Rfl:     valACYclovir (VALTREX) 1,000 mg tablet, if needed, Disp: , Rfl:     Cholecalciferol (VITAMIN D3 PO), Take by mouth in the morning (Patient not taking: Reported on 2/19/2024), Disp: , Rfl:     Cyanocobalamin (VITAMIN B-12 PO), Take by mouth in the morning (Patient not taking: Reported on 2/19/2024), Disp: , Rfl:     Omega-3 Fatty Acids (FISH OIL PO), Take by mouth in the morning (Patient not taking: Reported on 2/19/2024), Disp: , Rfl:     Probiotic Product (PROBIOTIC PO), Take by mouth in the morning (Patient not taking: Reported on 2/19/2024), Disp: , Rfl:     Pyridoxine HCl (B-6 PO), Take by mouth (Patient not taking: Reported on 2/19/2024), Disp: , Rfl:   Allergies   Allergen Reactions    Celebrex [Celecoxib] Tongue Swelling    Sulfa Antibiotics Hives and Tongue Swelling    Amlodipine Other (See Comments)     Possible  ankle swelling on 5 mg    Lisinopril Other (See Comments)     dizziness    Methocarbamol Other (See Comments)     Nightmares    Other Sneezing     Seasonal allergies    Sulfamethoxazole-Trimethoprim Hives         Labs:  Lab Results   Component Value Date    ALT 13 01/29/2024    AST 21 01/29/2024    BUN 13 01/30/2024    CALCIUM 8.8 01/30/2024     (H) 01/30/2024    CO2 25 01/30/2024    CREATININE 0.65 01/30/2024    HCT 43.3 01/30/2024    HGB 14.4 01/30/2024    MG 2.2 01/29/2024     (H) 01/30/2024    K 4.1 01/30/2024    WBC 9.24 01/30/2024       Imaging:  Cardioversion    Result Date: 2024  Narrative: This procedure could not be completed and was aborted.    Echo complete w/ contrast if indicated    Result Date: 2024  Narrative: Technically difficult but adequate transthoracic echocardiogram study limited by body habitus and lung interference.  Patient noted to be in atrial fibrillation rhythm. Mild concentric left ventricle hypertrophy, normal left trickle systolic function, indeterminate diastolic function.  Left ventricular ejection fraction is estimated around 60%. Normal right ventricle size and systolic function. Normal left and right atrial cavity size. Mild aortic valvular sclerosis, no aortic valve stenosis or regurgitation. Mitral valve leaflet sclerosis, trace mitral valve regurgitation. Trace tricuspid valve regurgitation. No obvious pulmonary hypertension. No pericardial effusion. Normal aortic root size. No previous echocardiogram is available for comparison.     X-ray chest 1 view portable    Result Date: 2024  Narrative: CHEST INDICATION:   chest pain. COMPARISON: 2023. EXAM PERFORMED/VIEWS:  XR CHEST PORTABLE FINDINGS: Cardiomediastinal silhouette appears unremarkable. The lungs are clear.  No pneumothorax or pleural effusion. Osseous structures appear within normal limits for patient age.     Impression: No acute disease in the chest. Workstation performed: [id number]       EC2024: Normal sinus rhythm, borderline LA enlargement, incomplete RBBB, poor R wave progression    Review of Systems:  Review of Systems   Constitutional:  Negative for chills, diaphoresis, fatigue and fever.   HENT:  Negative for congestion.    Eyes:  Negative for photophobia and visual disturbance.   Respiratory:  Negative for chest tightness and shortness of breath.    Cardiovascular:  Negative for chest pain, palpitations and leg swelling.   Gastrointestinal:  Negative for abdominal distention, abdominal pain, diarrhea, nausea and  "vomiting.   Genitourinary:  Negative for difficulty urinating and dysuria.   Musculoskeletal:  Negative for arthralgias, gait problem and joint swelling.   Skin:  Negative for color change, pallor and rash.   Neurological:  Negative for dizziness, syncope, numbness and headaches.   Psychiatric/Behavioral:  Negative for agitation, behavioral problems and confusion. The patient is nervous/anxious.          Vitals:    02/19/24 0830   BP: 130/80   Pulse: 66      Vitals:    02/19/24 0830   Weight: 57.6 kg (127 lb)     Height: 5' 3\" (160 cm)     Physical Exam:  General appearance:  Appears stated age, alert, well appearing and in no distress  HEENT:  PERRLA, EOMI, no scleral icterus, no conjunctival pallor  NECK:  Supple, No elevated JVP, no thyromegaly, no carotid bruits  HEART:  Regular rate and rhythm, normal S1/S2, no S3/S4, no murmur or rub  LUNGS:  Clear to auscultation bilaterally  ABDOMEN:  Soft, non-tender, positive bowel sounds, no rebound or guarding, no organomegaly   EXTREMITIES:  No edema  VASCULAR:  Normal pedal pulses   SKIN: No lesions or rashes on exposed skin  NEURO:  CN II-XII intact, no focal deficits   "

## 2024-03-15 ENCOUNTER — CLINICAL SUPPORT (OUTPATIENT)
Dept: CARDIOLOGY CLINIC | Facility: CLINIC | Age: 59
End: 2024-03-15
Payer: COMMERCIAL

## 2024-03-15 DIAGNOSIS — I48.0 PAROXYSMAL ATRIAL FIBRILLATION (HCC): ICD-10-CM

## 2024-03-15 PROCEDURE — 93248 EXT ECG>7D<15D REV&INTERPJ: CPT | Performed by: STUDENT IN AN ORGANIZED HEALTH CARE EDUCATION/TRAINING PROGRAM

## 2024-03-16 ENCOUNTER — HOSPITAL ENCOUNTER (OUTPATIENT)
Dept: RADIOLOGY | Facility: HOSPITAL | Age: 59
Discharge: HOME | End: 2024-03-16
Attending: SURGERY
Payer: COMMERCIAL

## 2024-03-16 DIAGNOSIS — Z80.3 FAMILY HISTORY OF BREAST CANCER: ICD-10-CM

## 2024-03-16 DIAGNOSIS — N60.92 ATYPICAL DUCTAL HYPERPLASIA OF LEFT BREAST: ICD-10-CM

## 2024-03-16 PROCEDURE — C8908 MRI W/O FOL W/CONT, BREAST,: HCPCS

## 2024-03-16 PROCEDURE — A9585 GADOBUTROL INJECTION: HCPCS | Performed by: SURGERY

## 2024-03-16 PROCEDURE — G1004 CDSM NDSC: HCPCS

## 2024-03-16 RX ORDER — GADOBUTROL 604.72 MG/ML
5 INJECTION INTRAVENOUS
Status: COMPLETED | OUTPATIENT
Start: 2024-03-16 | End: 2024-03-16

## 2024-03-16 RX ADMIN — GADOBUTROL 5 ML: 604.72 INJECTION INTRAVENOUS at 14:38

## 2024-04-03 ENCOUNTER — HOSPITAL ENCOUNTER (EMERGENCY)
Facility: HOSPITAL | Age: 59
Discharge: HOME | End: 2024-04-03
Attending: EMERGENCY MEDICINE
Payer: COMMERCIAL

## 2024-04-03 VITALS
OXYGEN SATURATION: 100 % | WEIGHT: 127.65 LBS | SYSTOLIC BLOOD PRESSURE: 170 MMHG | BODY MASS INDEX: 22.61 KG/M2 | RESPIRATION RATE: 18 BRPM | TEMPERATURE: 98 F | DIASTOLIC BLOOD PRESSURE: 90 MMHG | HEART RATE: 95 BPM

## 2024-04-03 DIAGNOSIS — D58.2 ELEVATED HEMOGLOBIN (HCC): ICD-10-CM

## 2024-04-03 DIAGNOSIS — R00.2 PALPITATIONS: Primary | ICD-10-CM

## 2024-04-03 LAB
ALBUMIN SERPL BCP-MCNC: 4.6 G/DL (ref 3.5–5)
ALP SERPL-CCNC: 69 U/L (ref 34–104)
ALT SERPL W P-5'-P-CCNC: 16 U/L (ref 7–52)
ANION GAP SERPL CALCULATED.3IONS-SCNC: 9 MMOL/L (ref 4–13)
AST SERPL W P-5'-P-CCNC: 15 U/L (ref 13–39)
ATRIAL RATE: 89 BPM
BASOPHILS # BLD AUTO: 0.07 THOUSANDS/ÂΜL (ref 0–0.1)
BASOPHILS NFR BLD AUTO: 1 % (ref 0–1)
BILIRUB SERPL-MCNC: 0.57 MG/DL (ref 0.2–1)
BNP SERPL-MCNC: 14 PG/ML (ref 0–100)
BUN SERPL-MCNC: 10 MG/DL (ref 5–25)
CALCIUM SERPL-MCNC: 9.4 MG/DL (ref 8.4–10.2)
CARDIAC TROPONIN I PNL SERPL HS: 5 NG/L
CHLORIDE SERPL-SCNC: 105 MMOL/L (ref 96–108)
CO2 SERPL-SCNC: 27 MMOL/L (ref 21–32)
CREAT SERPL-MCNC: 0.67 MG/DL (ref 0.6–1.3)
EOSINOPHIL # BLD AUTO: 0.18 THOUSAND/ÂΜL (ref 0–0.61)
EOSINOPHIL NFR BLD AUTO: 2 % (ref 0–6)
ERYTHROCYTE [DISTWIDTH] IN BLOOD BY AUTOMATED COUNT: 11.7 % (ref 11.6–15.1)
GFR SERPL CREATININE-BSD FRML MDRD: 95 ML/MIN/1.73SQ M
GLUCOSE SERPL-MCNC: 103 MG/DL (ref 65–140)
HCT VFR BLD AUTO: 48.7 % (ref 34.8–46.1)
HGB BLD-MCNC: 16.6 G/DL (ref 11.5–15.4)
IMM GRANULOCYTES # BLD AUTO: 0.01 THOUSAND/UL (ref 0–0.2)
IMM GRANULOCYTES NFR BLD AUTO: 0 % (ref 0–2)
LYMPHOCYTES # BLD AUTO: 2.07 THOUSANDS/ÂΜL (ref 0.6–4.47)
LYMPHOCYTES NFR BLD AUTO: 27 % (ref 14–44)
MAGNESIUM SERPL-MCNC: 2.1 MG/DL (ref 1.9–2.7)
MCH RBC QN AUTO: 30.4 PG (ref 26.8–34.3)
MCHC RBC AUTO-ENTMCNC: 34.1 G/DL (ref 31.4–37.4)
MCV RBC AUTO: 89 FL (ref 82–98)
MONOCYTES # BLD AUTO: 0.55 THOUSAND/ÂΜL (ref 0.17–1.22)
MONOCYTES NFR BLD AUTO: 7 % (ref 4–12)
NEUTROPHILS # BLD AUTO: 4.88 THOUSANDS/ÂΜL (ref 1.85–7.62)
NEUTS SEG NFR BLD AUTO: 63 % (ref 43–75)
NRBC BLD AUTO-RTO: 0 /100 WBCS
P AXIS: 64 DEGREES
PLATELET # BLD AUTO: 400 THOUSANDS/UL (ref 149–390)
PMV BLD AUTO: 9.9 FL (ref 8.9–12.7)
POTASSIUM SERPL-SCNC: 3.6 MMOL/L (ref 3.5–5.3)
PR INTERVAL: 132 MS
PROT SERPL-MCNC: 7.5 G/DL (ref 6.4–8.4)
QRS AXIS: -12 DEGREES
QRSD INTERVAL: 92 MS
QT INTERVAL: 368 MS
QTC INTERVAL: 447 MS
RBC # BLD AUTO: 5.46 MILLION/UL (ref 3.81–5.12)
SODIUM SERPL-SCNC: 141 MMOL/L (ref 135–147)
T WAVE AXIS: 57 DEGREES
TSH SERPL DL<=0.05 MIU/L-ACNC: 1.35 UIU/ML (ref 0.45–4.5)
VENTRICULAR RATE: 89 BPM
WBC # BLD AUTO: 7.76 THOUSAND/UL (ref 4.31–10.16)

## 2024-04-03 PROCEDURE — 83735 ASSAY OF MAGNESIUM: CPT | Performed by: EMERGENCY MEDICINE

## 2024-04-03 PROCEDURE — 93010 ELECTROCARDIOGRAM REPORT: CPT | Performed by: INTERNAL MEDICINE

## 2024-04-03 PROCEDURE — 84484 ASSAY OF TROPONIN QUANT: CPT | Performed by: EMERGENCY MEDICINE

## 2024-04-03 PROCEDURE — 93005 ELECTROCARDIOGRAM TRACING: CPT

## 2024-04-03 PROCEDURE — 99284 EMERGENCY DEPT VISIT MOD MDM: CPT

## 2024-04-03 PROCEDURE — 36415 COLL VENOUS BLD VENIPUNCTURE: CPT | Performed by: EMERGENCY MEDICINE

## 2024-04-03 PROCEDURE — 85025 COMPLETE CBC W/AUTO DIFF WBC: CPT | Performed by: EMERGENCY MEDICINE

## 2024-04-03 PROCEDURE — 83880 ASSAY OF NATRIURETIC PEPTIDE: CPT | Performed by: EMERGENCY MEDICINE

## 2024-04-03 PROCEDURE — 80053 COMPREHEN METABOLIC PANEL: CPT | Performed by: EMERGENCY MEDICINE

## 2024-04-03 PROCEDURE — 84443 ASSAY THYROID STIM HORMONE: CPT | Performed by: EMERGENCY MEDICINE

## 2024-04-03 PROCEDURE — 99284 EMERGENCY DEPT VISIT MOD MDM: CPT | Performed by: EMERGENCY MEDICINE

## 2024-04-03 NOTE — ED PROVIDER NOTES
History  Chief Complaint   Patient presents with    Palpitations     Pt reports waking up this am having palpations & lightheaded, hx of afib, also states it might be a panic attack        Palpitations    Patient having palpitations and lightheadedness.  She has a history of atrial fibrillation and also apparently had a short run of SVT on a Holter monitor.  The symptoms are better at the moment.  She is taking Cardizem.  Denies alcohol use.  No fevers or chills.  She is eating and drinking normally.  No chest pain.  She states that she had something that was measuring her pulse and it was about 120.  Currently not experiencing the same symptoms.  She is taking Eliquis.  She has an appointment with cardiologist in a few weeks.  Prior to Admission Medications   Prescriptions Last Dose Informant Patient Reported? Taking?   ALPRAZolam (XANAX) 0.25 mg tablet  Self Yes No   Sig: Take 0.25 mg by mouth Three times daily as needed   Cholecalciferol (VITAMIN D3 PO)  Self Yes No   Sig: Take by mouth in the morning   Patient not taking: Reported on 2/19/2024   Cyanocobalamin (VITAMIN B-12 PO)  Self Yes No   Sig: Take by mouth in the morning   Patient not taking: Reported on 2/19/2024   Miconazole-Zinc Oxide-Petrolat 0.25-15-81.35 % OINT  Self Yes No   Sig: if needed (for rash on lips)   Omega-3 Fatty Acids (FISH OIL PO)  Self Yes No   Sig: Take by mouth in the morning   Patient not taking: Reported on 2/19/2024   Probiotic Product (PROBIOTIC PO)  Self Yes No   Sig: Take by mouth in the morning   Patient not taking: Reported on 2/19/2024   Pyridoxine HCl (B-6 PO)  Self Yes No   Sig: Take by mouth   Patient not taking: Reported on 2/19/2024   apixaban (ELIQUIS) 5 mg   No No   Sig: Take 1 tablet (5 mg total) by mouth 2 (two) times a day   clobetasol (TEMOVATE) 0.05 % ointment  Self Yes No   Sig: APPLY TWICE A DAY TO AFFECTED AREAS ON HANDS FOR 2 WEEKS THEN DAILY FOR 2 WEEKS THEN 3 TIMES A WEEK AS NEEDED   clonazePAM (KlonoPIN)  0.5 mg tablet  Self Yes No   Sig: Take 0.5 mg by mouth daily at bedtime   diltiazem (CARDIZEM SR) 60 mg 12 hr capsule   No No   Sig: Take 1 capsule (60 mg total) by mouth 2 (two) times a day   famotidine (PEPCID) 40 MG tablet  Self No No   Sig: Take 1 tablet (40 mg total) by mouth 2 (two) times a day   Patient taking differently: Take 40 mg by mouth 2 (two) times a day as needed   valACYclovir (VALTREX) 1,000 mg tablet  Self Yes No   Sig: if needed      Facility-Administered Medications: None       Past Medical History:   Diagnosis Date    A-fib (HCC) 01/29/2024    Anxiety     Breast lump in female     left-  lumpectomy today 10/31/2023    Hypertension     SVT (supraventricular tachycardia) 01/29/2024       Past Surgical History:   Procedure Laterality Date    AUGMENTATION MAMMAPLASTY Bilateral 2000    saline    BREAST BIOPSY Left 09/26/2023    BREAST LUMPECTOMY Left 10/31/2023    Procedure: LUMPECTOMY BREAST PELON  LOCALIZED;  Surgeon: Magalie Hubbard MD;  Location: [facility];  Service: Surgical Oncology    COLONOSCOPY      EGD  09/30/2021    Intrinsic moderate stenosis dilated with 54 [address]john, erosive gastritis-biopsy negative for H. pylori by Dr. Michele    HYSTERECTOMY      age 51    US BREAST NEEDLE LOC LEFT Left 10/27/2023    US GUIDED BREAST BIOPSY LEFT COMPLETE Left 09/26/2023       Family History   Problem Relation Age of Onset    Breast cancer Mother 63        again at 71 years old.    No Known Problems Father     No Known Problems Daughter     Breast cancer Maternal Aunt 65    Pancreatic cancer Maternal Uncle         80's    No Known Problems Maternal Grandmother     No Known Problems Maternal Grandfather     No Known Problems Paternal Grandmother     No Known Problems Paternal Grandfather      I have reviewed and agree with the history as documented.    E-Cigarette/Vaping    E-Cigarette Use Never User      E-Cigarette/Vaping Substances     Social History     Tobacco Use    Smoking status: Former      Current packs/day: 0.00     Average packs/day: 0.3 packs/day for 20.0 years (5.0 ttl pk-yrs)     Types: Cigarettes     Start date: 2003     Quit date: 2023     Years since quittin.5    Smokeless tobacco: Never    Tobacco comments:     1 cig a day 10/2023 Quit   Vaping Use    Vaping status: Never Used   Substance Use Topics    Alcohol use: Yes     Alcohol/week: 7.0 standard drinks of alcohol     Types: 7 Glasses of wine per week     Comment: DAILY  1 GLASS WINE    Drug use: Never       Review of Systems    Physical Exam  Physical Exam  Vitals and nursing note reviewed.   Constitutional:       General: She is not in acute distress.     Appearance: Normal appearance. She is well-developed. She is not ill-appearing, toxic-appearing or diaphoretic.   HENT:      Head: Normocephalic and atraumatic.      Right Ear: Hearing normal. No drainage or swelling.      Left Ear: Hearing normal. No drainage or swelling.   Eyes:      General: Lids are normal.         Right eye: No discharge.         Left eye: No discharge.      Conjunctiva/sclera: Conjunctivae normal.   Neck:      Vascular: No JVD.      Trachea: Trachea normal.   Cardiovascular:      Rate and Rhythm: Normal rate and regular rhythm.      Pulses: Normal pulses.      Heart sounds: Normal heart sounds. No murmur heard.     No friction rub. No gallop.      Comments: The monitor she has an occasional PAC but overall sinus rhythm.  Pulmonary:      Effort: Pulmonary effort is normal. No respiratory distress.      Breath sounds: Normal breath sounds. No stridor. No wheezing or rales.   Abdominal:      Palpations: Abdomen is soft.      Tenderness: There is no abdominal tenderness. There is no guarding or rebound.   Musculoskeletal:         General: Normal range of motion.      Cervical back: Normal range of motion.      Right lower leg: No edema.      Left lower leg: No edema.   Skin:     General: Skin is warm and dry.      Coloration: Skin is not pale.       Findings: No rash.   Neurological:      General: No focal deficit present.      Mental Status: She is alert.      GCS: GCS eye subscore is 4. GCS verbal subscore is 5. GCS motor subscore is 6.      Sensory: No sensory deficit.      Motor: No abnormal muscle tone.   Psychiatric:         Mood and Affect: Mood normal.         Speech: Speech normal.         Behavior: Behavior is cooperative.         Vital Signs  ED Triage Vitals [04/03/24 1317]   Temperature Pulse Respirations Blood Pressure SpO2   98 °F (36.7 °C) 95 18 (!) 191/100 100 %      Temp Source Heart Rate Source Patient Position - Orthostatic VS BP Location FiO2 (%)   Oral Monitor Sitting Right arm --      Pain Score       --           Vitals:    04/03/24 1317 04/03/24 1341   BP: (!) 191/100 170/90   Pulse: 95    Patient Position - Orthostatic VS: Sitting          Visual Acuity      ED Medications  Medications   lactated ringers bolus 500 mL (0 mL Intravenous Hold 4/3/24 1420)       Diagnostic Studies  Results Reviewed       Procedure Component Value Units Date/Time    TSH, 3rd generation with Free T4 reflex [[id number]]  (Normal) Collected: 04/03/24 1325    Lab Status: Final result Specimen: Blood from Arm, Right Updated: 04/03/24 1426     TSH 3RD GENERATON 1.347 uIU/mL     HS Troponin 0hr (reflex protocol) [[id number]]  (Normal) Collected: 04/03/24 1325    Lab Status: Final result Specimen: Blood from Arm, Right Updated: 04/03/24 1417     hs TnI 0hr 5 ng/L     B-Type Natriuretic Peptide(BNP) [[id number]]  (Normal) Collected: 04/03/24 1325    Lab Status: Final result Specimen: Blood from Arm, Right Updated: 04/03/24 1415     BNP 14 pg/mL     Comprehensive metabolic panel [[id number]] Collected: 04/03/24 1325    Lab Status: Final result Specimen: Blood from Arm, Right Updated: 04/03/24 1411     Sodium 141 mmol/L      Potassium 3.6 mmol/L      Chloride 105 mmol/L      CO2 27 mmol/L      ANION GAP 9 mmol/L      BUN 10 mg/dL      Creatinine 0.67 mg/dL      Glucose  103 mg/dL      Calcium 9.4 mg/dL      AST 15 U/L      ALT 16 U/L      Alkaline Phosphatase 69 U/L      Total Protein 7.5 g/dL      Albumin 4.6 g/dL      Total Bilirubin 0.57 mg/dL      eGFR 95 ml/min/1.73sq m     Narrative:      National Kid[employer] Foundation guidelines for Chronic Kidney Disease (CKD):     Stage 1 with normal or high GFR (GFR > 90 mL/min/1.73 square meters)    Stage 2 Mild CKD (GFR = 60-89 mL/min/1.73 square meters)    Stage 3A Moderate CKD (GFR = 45-59 mL/min/1.73 square meters)    Stage 3B Moderate CKD (GFR = 30-44 mL/min/1.73 square meters)    Stage 4 Severe CKD (GFR = 15-29 mL/min/1.73 square meters)    Stage 5 End Stage CKD (GFR <15 mL/min/1.73 square meters)  Note: GFR calculation is accurate only with a steady state creatinine    Magnesium [[id number]]  (Normal) Collected: 04/03/24 1325    Lab Status: Final result Specimen: Blood from Arm, Right Updated: 04/03/24 1411     Magnesium 2.1 mg/dL     CBC and differential [[id number]]  (Abnormal) Collected: 04/03/24 1325    Lab Status: Final result Specimen: Blood from Arm, Right Updated: 04/03/24 1354     WBC 7.76 Thousand/uL      RBC 5.46 Million/uL      Hemoglobin 16.6 g/dL      Hematocrit 48.7 %      MCV 89 fL      MCH 30.4 pg      MCHC 34.1 g/dL      RDW 11.7 %      MPV 9.9 fL      Platelets 400 Thousands/uL      nRBC 0 /100 WBCs      Neutrophils Relative 63 %      Immature Grans % 0 %      Lymphocytes Relative 27 %      Monocytes Relative 7 %      Eosinophils Relative 2 %      Basophils Relative 1 %      Neutrophils Absolute 4.88 Thousands/µL      Absolute Immature Grans 0.01 Thousand/uL      Absolute Lymphocytes 2.07 Thousands/µL      Absolute Monocytes 0.55 Thousand/µL      Eosinophils Absolute 0.18 Thousand/µL      Basophils Absolute 0.07 Thousands/µL                    No orders to display              Procedures  ECG 12 Lead Documentation Only    Date/Time: 4/3/2024 1:41 PM    Performed by: Shady Ospina MD  Authorized  by: Shady Ospina MD    Indications / Diagnosis:  Palpiations  ECG reviewed by me, the ED Provider: yes    Patient location:  ED  Interpretation:     Interpretation: non-specific    Rate:     ECG rate:  89    ECG rate assessment: normal    Rhythm:     Rhythm: sinus rhythm    Ectopy:     Ectopy: PAC    QRS:     QRS axis:  Normal    QRS intervals:  Normal  Conduction:     Conduction: abnormal      Abnormal conduction: incomplete RBBB    ST segments:     ST segments:  Normal  T waves:     T waves: normal             ED Course  ED Course as of 04/03/24 1530   Wed Apr 03, 2024   1440 Went over labs. Refused fluids. Tolerating PO at home.   1522 Hemoglobin(!): 16.6  Slightly elevated.  Possible dehydration wanted to hydrate but the patient did not want IV fluids.  She is tolerating p.o. I told her to just drink more fluids at home.   1522 hs TnI 0hr: 5  Negative.   1522 TSH 3RD GENERATON: 1.347  Normal.   1522 BNP: 14  Normal.   1522 Comprehensive metabolic panel  No major electrolyte abnormalities.  Renal function normal.   1522 MAGNESIUM: 2.1  Magnesium normal.                               SBIRT 22yo+      Flowsheet Row Most Recent Value   Initial Alcohol Screen: US AUDIT-C     1. How often do you have a drink containing alcohol? 0 Filed at: 04/03/2024 1316   2. How many drinks containing alcohol do you have on a typical day you are drinking?  0 Filed at: 04/03/2024 1316   3b. FEMALE Any Age, or MALE 65+: How often do you have 4 or more drinks on one occassion? 0 Filed at: 04/03/2024 1316   Audit-C Score 0 Filed at: 04/03/2024 1316   KATE: How many times in the past year have you...    Used an illegal drug or used a prescription medication for non-medical reasons? Never Filed at: 04/03/2024 1316                      Medical Decision Making  Patient complaining of palpitations with a history of A-fib and SVT which have now resolved.  She has remained in normal sinus rhythm with only occasional PACs.  She is  no major electrolyte abnormalities.  She is concerned that this is possible that it could have been a panic attack and while that certainly is in the differential we discussed the other possibilities she should continue to take her medication.  There is no reason she needs to stay in the hospital and she is currently on Eliquis and her rate is controlled and she is currently taking appropriate medications at home.  She does have a follow-up with her cardiologist in a few weeks.  I encouraged her to drink more fluids as her hemoglobin was elevated.  Does not sound like she is really dehydrated but that could predispose people to arrhythmia so she will do that as she is tolerating p.o. at home.    Amount and/or Complexity of Data Reviewed  Labs: ordered. Decision-making details documented in ED Course.             Disposition  Final diagnoses:   Palpitations   Elevated hemoglobin (HCC)     Time reflects when diagnosis was documented in both MDM as applicable and the Disposition within this note       Time User Action Codes Description Comment    4/3/2024  2:41 PM Shady Ospina Add [R00.2] Palpitations     4/3/2024  2:42 PM Shady Ospina Add [D58.2] Elevated hemoglobin (HCC)           ED Disposition       ED Disposition   Discharge    Condition   Stable    Date/Time   Wed Apr 3, 2024 1441    Comment   Estella Isapa discharge to home/self care.                   Follow-up Information       Follow up With Specialties Details Why Contact Info    Jayden Capps MD Cardiology, Internal Medicine Go to  reevaluation at your next appointment. [address] [facility] [id number] [phone]              Current Discharge Medication List        CONTINUE these medications which have NOT CHANGED    Details   ALPRAZolam (XANAX) 0.25 mg tablet Take 0.25 mg by mouth Three times daily as needed      apixaban (ELIQUIS) 5 mg Take 1 tablet (5 mg total) by mouth 2 (two) times a day  Qty: 60 tablet,  Refills: 5    Associated Diagnoses: Atrial fibrillation (HCC)      Cholecalciferol (VITAMIN D3 PO) Take by mouth in the morning      clobetasol (TEMOVATE) 0.05 % ointment APPLY TWICE A DAY TO AFFECTED AREAS ON HANDS FOR 2 WEEKS THEN DAILY FOR 2 WEEKS THEN 3 TIMES A WEEK AS NEEDED      clonazePAM (KlonoPIN) 0.5 mg tablet Take 0.5 mg by mouth daily at bedtime      Cyanocobalamin (VITAMIN B-12 PO) Take by mouth in the morning      diltiazem (CARDIZEM SR) 60 mg 12 hr capsule Take 1 capsule (60 mg total) by mouth 2 (two) times a day  Qty: 60 capsule, Refills: 2    Associated Diagnoses: Paroxysmal atrial fibrillation (HCC)      famotidine (PEPCID) 40 MG tablet Take 1 tablet (40 mg total) by mouth 2 (two) times a day  Qty: 60 tablet, Refills: 5    Associated Diagnoses: Oropharyngeal dysphagia; Esophageal stenosis; Gastroesophageal reflux disease, unspecified whether esophagitis present      Miconazole-Zinc Oxide-Petrolat 0.25-15-81.35 % OINT if needed (for rash on lips)      Omega-3 Fatty Acids (FISH OIL PO) Take by mouth in the morning      Probiotic Product (PROBIOTIC PO) Take by mouth in the morning      Pyridoxine HCl (B-6 PO) Take by mouth      valACYclovir (VALTREX) 1,000 mg tablet if needed             No discharge procedures on file.    PDMP Review       None            ED Provider  Electronically Signed by             Shady Ospina MD  04/03/24 [id number]

## 2024-04-09 NOTE — PROGRESS NOTES
Cardiology Consultation     Estella Laird  [id number]  1965  [facility] CARDIOLOGY [place]  [address] [id number]      1. Paroxysmal atrial fibrillation (HCC)        2. Primary hypertension        3. Hypertriglyceridemia        4. Tobacco use disorder              Discussion/Summary:  Paroxysmal atrial fibrillation  -Diagnosed during hospitalization at the end of January 2024  - Anticoagulated on Eliquis 5 mg twice daily  - Patient spontaneously converted back to sinus rhythm prior to her MARIAH cardioversion  - Initially rate controlled with diltiazem 120 mg daily, but patient reported developing diarrhea as well as shakiness and fatigue on Cardizem, so changed to propranolol 60 mg daily-> had diarrhea on propranolol so changed to bisoprolol-> had symptoms on bisoprolol so changed to Cardizem SR 60 mg twice daily  - Currently tolerating the Cardizem CR  - Patient really wants to stop her Eliquis, I recommended she continue it, but ultimately said it was her decision if she wanted to stop the Eliquis and accept the small risk of stroke annually  - Declined loop recorder and Apple watch  - May get [employer] mobile  Hypertension  - Previously on valsartan 160 mg daily  - Continue with Cardizem SR 60 mg twice daily  - BP well controlled today  Hyperlipidemia  - Not on statin currently  - Lipid profile 6/29/2023 showed total cholesterol 165, triglycerides 318, HDL 56, LDL 45  Former tobacco use  - Quit in Sept 2023  Alcohol use  - Drinks about 2 glasses of wine each night with dinner  Anxiety  - Recommended she follow-up with her PCP and psychologist for further management  History of breast cancer    Follow-up in 6 months.    History of Present Illness:  Estella Laird is a 59 y.o. year old female with a past medical history of paroxysmal atrial fibrillation, hypertension, hyperlipidemia, history of breast cancer, anxiety, tobacco use, and alcohol use.    2/19/2024: She reports feeling okay today.  She  has been having some panic attacks recently.  Reports feeling some palpitations with panic attacks, but denies feeling any recurrent atrial fibrillation recently.  She is doing okay on the bisoprolol, but reports feeling of fatigue/crushing sensation about 2 hours after taking the dose.  She is going to try taking it before going to bed to see if it helps with her symptoms.    Interval history:  She reports feeling very anxious recently.  She has had many life events recently that has caused a lot of stress.  She feels all these problems are now weighing on her mind regularly and making her feel very anxious.  She reports having palpitations on a regular basis and was feeling her heart beating quickly.  She also had an ED visit last week for the palpitations/racing heart.     Patient Active Problem List   Diagnosis    Tobacco use disorder    Seasonal allergic rhinitis    Hypertriglyceridemia    Family history of breast cancer    Atopic dermatitis and related condition    Chronic neck pain    Abnormal EKG    Dysphagia    Intraductal papilloma    Atypical ductal hyperplasia of left breast    Family history of pancreatic cancer    Atrial fibrillation (HCC)    Hypertension     Past Medical History:   Diagnosis Date    A-fib (HCC) 2024    Anxiety     Breast lump in female     left-  lumpectomy today 10/31/2023    Hypertension     SVT (supraventricular tachycardia) 2024     Social History     Socioeconomic History    Marital status: /Civil Union     Spouse name: Not on file    Number of children: Not on file    Years of education: Not on file    Highest education level: Not on file   Occupational History    Occupation: hairdresser   Tobacco Use    Smoking status: Former     Current packs/day: 0.00     Average packs/day: 0.3 packs/day for 20.0 years (5.0 ttl pk-yrs)     Types: Cigarettes     Start date: 2003     Quit date: 2023     Years since quittin.5    Smokeless tobacco: Never     Tobacco comments:     1 cig a day 10/2023 Quit   Vaping Use    Vaping status: Never Used   Substance and Sexual Activity    Alcohol use: Yes     Alcohol/week: 7.0 standard drinks of alcohol     Types: 7 Glasses of wine per week     Comment: DAILY  1 GLASS WINE    Drug use: Never    Sexual activity: Not on file   Other Topics Concern    Not on file   Social History Narrative    Not on file     Social Determinants of Health     Financial Resource Strain: Not on file   Food Insecurity: Not on file   Transportation Needs: Not on file   Physical Activity: Not on file   Stress: Not on file   Social Connections: Unknown (12/3/2023)    Received from [facility]    Social Connection and Isolation Panel [NHANES]     Frequency of Communication with Friends and Family: Not on file     Frequency of Social Gatherings with Friends and Family: Not on file     Attends Confucianist Services: Not on file     Active Member of Clubs or Organizations: Not on file     Attends Club or Organization Meetings: Not on file     Marital Status:    Intimate Partner Violence: Not At Risk (12/3/2023)    Received from [facility]    Humiliation, Afraid, Rape, and Kick questionnaire     Fear of Current or Ex-Partner: No     Emotionally Abused: No     Physically Abused: No     Sexually Abused: No   Housing Stability: Not on file      Family History   Problem Relation Age of Onset    Breast cancer Mother 63        again at 71 years old.    No Known Problems Father     No Known Problems Daughter     Breast cancer Maternal Aunt 65    Pancreatic cancer Maternal Uncle         80's    No Known Problems Maternal Grandmother     No Known Problems Maternal Grandfather     No Known Problems Paternal Grandmother     No Known Problems Paternal Grandfather      Past Surgical History:   Procedure Laterality Date    AUGMENTATION MAMMAPLASTY Bilateral 2000    saline    BREAST BIOPSY Left 09/26/2023    BREAST LUMPECTOMY Left  10/31/2023    Procedure: LUMPECTOMY BREAST PELON  LOCALIZED;  Surgeon: Magalie Hubbard MD;  Location: [facility];  Service: Surgical Oncology    COLONOSCOPY      EGD  09/30/2021    Intrinsic moderate stenosis dilated with 54 French Littlejohn, erosive gastritis-biopsy negative for H. pylori by Dr. Michele    HYSTERECTOMY      age 51    US BREAST NEEDLE LOC LEFT Left 10/27/2023    US GUIDED BREAST BIOPSY LEFT COMPLETE Left 09/26/2023       Current Outpatient Medications:     ALPRAZolam (XANAX) 0.25 mg tablet, Take 0.25 mg by mouth Three times daily as needed, Disp: , Rfl:     apixaban (ELIQUIS) 5 mg, Take 1 tablet (5 mg total) by mouth 2 (two) times a day, Disp: 60 tablet, Rfl: 5    clobetasol (TEMOVATE) 0.05 % ointment, APPLY TWICE A DAY TO AFFECTED AREAS ON HANDS FOR 2 WEEKS THEN DAILY FOR 2 WEEKS THEN 3 TIMES A WEEK AS NEEDED, Disp: , Rfl:     clonazePAM (KlonoPIN) 0.5 mg tablet, Take 0.5 mg by mouth daily at bedtime, Disp: , Rfl:     desonide (DESOWEN) 0.05 % cream, Apply topically if needed, Disp: , Rfl:     diltiazem (CARDIZEM SR) 60 mg 12 hr capsule, Take 1 capsule (60 mg total) by mouth 2 (two) times a day, Disp: 60 capsule, Rfl: 2    famotidine (PEPCID) 40 MG tablet, Take 1 tablet (40 mg total) by mouth 2 (two) times a day (Patient taking differently: Take 40 mg by mouth 2 (two) times a day as needed), Disp: 60 tablet, Rfl: 5    Miconazole-Zinc Oxide-Petrolat 0.25-15-81.35 % OINT, if needed (for rash on lips), Disp: , Rfl:     tacrolimus (PROTOPIC) 0.1 % ointment, APPLY TO AFFECTED AREA S) TWO TIMES A DAY AS NEEDED, Disp: , Rfl:     valACYclovir (VALTREX) 1,000 mg tablet, if needed, Disp: , Rfl:   Allergies   Allergen Reactions    Celebrex [Celecoxib] Tongue Swelling    Sulfa Antibiotics Hives and Tongue Swelling    Amlodipine Other (See Comments)     Possible  ankle swelling on 5 mg    Lisinopril Other (See Comments)     dizziness    Methocarbamol Other (See Comments)     Nightmares    Other Sneezing     Seasonal  allergies    Sulfamethoxazole-Trimethoprim Hives         Labs:  Lab Results   Component Value Date    ALT 16 2024    AST 15 2024    BUN 10 2024    CALCIUM 9.4 2024     2024    CO2 27 2024    CREATININE 0.67 2024    HCT 48.7 (H) 2024    HGB 16.6 (H) 2024    MG 2.1 2024     (H) 2024    K 3.6 2024    WBC 7.76 2024       Imaging: Cardioversion    Result Date: 2024  Narrative: This procedure could not be completed and was aborted.    Echo complete w/ contrast if indicated    Result Date: 2024  Narrative: Technically difficult but adequate transthoracic echocardiogram study limited by body habitus and lung interference.  Patient noted to be in atrial fibrillation rhythm. Mild concentric left ventricle hypertrophy, normal left trickle systolic function, indeterminate diastolic function.  Left ventricular ejection fraction is estimated around 60%. Normal right ventricle size and systolic function. Normal left and right atrial cavity size. Mild aortic valvular sclerosis, no aortic valve stenosis or regurgitation. Mitral valve leaflet sclerosis, trace mitral valve regurgitation. Trace tricuspid valve regurgitation. No obvious pulmonary hypertension. No pericardial effusion. Normal aortic root size. No previous echocardiogram is available for comparison.     X-ray chest 1 view portable    Result Date: 2024  Narrative: CHEST INDICATION:   chest pain. COMPARISON: 2023. EXAM PERFORMED/VIEWS:  XR CHEST PORTABLE FINDINGS: Cardiomediastinal silhouette appears unremarkable. The lungs are clear.  No pneumothorax or pleural effusion. Osseous structures appear within normal limits for patient age.     Impression: No acute disease in the chest. Workstation performed: [id number]       EC2024: Normal sinus rhythm, borderline LA enlargement, incomplete RBBB, poor R wave progression    Review of Systems:  Review of  "Systems   Constitutional:  Negative for chills, diaphoresis, fatigue and fever.   HENT:  Negative for congestion.    Eyes:  Negative for photophobia and visual disturbance.   Respiratory:  Negative for chest tightness and shortness of breath.    Cardiovascular:  Negative for chest pain, palpitations and leg swelling.   Gastrointestinal:  Negative for abdominal distention, abdominal pain, diarrhea, nausea and vomiting.   Genitourinary:  Negative for difficulty urinating and dysuria.   Musculoskeletal:  Negative for arthralgias, gait problem and joint swelling.   Skin:  Negative for color change, pallor and rash.   Neurological:  Negative for dizziness, syncope, numbness and headaches.   Psychiatric/Behavioral:  Negative for agitation, behavioral problems and confusion. The patient is nervous/anxious.          Vitals:    04/11/24 0953   BP: 126/86   Pulse: 92   SpO2: 98%        Vitals:    04/11/24 0953   Weight: 57.6 kg (127 lb)       Height: 5' 3\" (160 cm)     Physical Exam:  General appearance:  Appears stated age, alert, well appearing and in no distress  HEENT:  PERRLA, EOMI, no scleral icterus, no conjunctival pallor  NECK:  Supple, No elevated JVP, no thyromegaly, no carotid bruits  HEART:  Regular rate and rhythm, normal S1/S2, no S3/S4, no murmur or rub  LUNGS:  Clear to auscultation bilaterally  ABDOMEN:  Soft, non-tender, positive bowel sounds, no rebound or guarding, no organomegaly   EXTREMITIES:  No edema  VASCULAR:  Normal pedal pulses   SKIN: No lesions or rashes on exposed skin  NEURO:  CN II-XII intact, no focal deficits   "

## 2024-04-11 ENCOUNTER — OFFICE VISIT (OUTPATIENT)
Dept: CARDIOLOGY CLINIC | Facility: CLINIC | Age: 59
End: 2024-04-11
Payer: COMMERCIAL

## 2024-04-11 VITALS
HEART RATE: 92 BPM | OXYGEN SATURATION: 98 % | HEIGHT: 63 IN | DIASTOLIC BLOOD PRESSURE: 86 MMHG | SYSTOLIC BLOOD PRESSURE: 126 MMHG | BODY MASS INDEX: 22.5 KG/M2 | WEIGHT: 127 LBS

## 2024-04-11 DIAGNOSIS — I48.0 PAROXYSMAL ATRIAL FIBRILLATION (HCC): Primary | ICD-10-CM

## 2024-04-11 DIAGNOSIS — E78.1 HYPERTRIGLYCERIDEMIA: ICD-10-CM

## 2024-04-11 DIAGNOSIS — F17.200 TOBACCO USE DISORDER: ICD-10-CM

## 2024-04-11 DIAGNOSIS — I10 PRIMARY HYPERTENSION: ICD-10-CM

## 2024-04-11 PROCEDURE — 99214 OFFICE O/P EST MOD 30 MIN: CPT | Performed by: STUDENT IN AN ORGANIZED HEALTH CARE EDUCATION/TRAINING PROGRAM

## 2024-04-11 RX ORDER — TACROLIMUS 1 MG/G
OINTMENT TOPICAL
COMMUNITY
Start: 2024-03-15

## 2024-04-11 RX ORDER — DESONIDE 0.5 MG/G
CREAM TOPICAL AS NEEDED
COMMUNITY
Start: 2024-03-18

## 2024-04-11 RX ORDER — DILTIAZEM HYDROCHLORIDE 60 MG/1
60 CAPSULE, EXTENDED RELEASE ORAL 2 TIMES DAILY
Qty: 180 CAPSULE | Refills: 1 | Status: SHIPPED | OUTPATIENT
Start: 2024-04-11

## 2024-05-16 ENCOUNTER — CLINICAL SUPPORT (OUTPATIENT)
Dept: GENETICS | Facility: CLINIC | Age: 59
End: 2024-05-16
Payer: COMMERCIAL

## 2024-05-16 ENCOUNTER — DOCUMENTATION (OUTPATIENT)
Dept: GENETICS | Facility: CLINIC | Age: 59
End: 2024-05-16

## 2024-05-16 DIAGNOSIS — Z80.0 FAMILY HISTORY OF PANCREATIC CANCER: ICD-10-CM

## 2024-05-16 DIAGNOSIS — Z80.3 FAMILY HISTORY OF BREAST CANCER: ICD-10-CM

## 2024-05-16 PROCEDURE — 36415 COLL VENOUS BLD VENIPUNCTURE: CPT

## 2024-05-16 NOTE — PROGRESS NOTES
Pre-Test Genetic Counseling Consult Note    Patient Name: Estella Laird   /Age: 1965/59 y.o.  Referring Provider: Magalie Hubbard MD, FACS    Date of Service: 2024  Genetic Counselor: Hattie Palm MS, [facility]  Interpretation Services: None  Location: In-person consult at [place]  Length of Visit:  50 Minutes    Estella was referred to the [facility] Cancer Risk and Genetic Assessment Program due to her family history of bilateral breast cancer and pancreatic cancer . she presents today to discuss the possibility of a hereditary cancer syndrome, options for genetic testing, and implications for her and her family.     Cancer History and Treatment:   Personal History:   Oncology History    No history exists.       Screening Hx:   Breast:  Breast Imaging:   Breast MRI Bilateral ():   Impression: Benign findings. No MRI evidence of malignancy in either breast.   Breast Density: Scattered fibroglandular density     Left Breast Biopsy (2023): Atypical ductal hyperplasia (ADH) with involvement of intraductal papilloma. -Background breast with florid usual ductal hyperplasia (UDH), sclerosing adenosis, cystic apocrine metaplasia and columnar cell change. -Biopsy site changes identified. -Benign skin. -Rare microcalcifications present, associated with benign ducts.  S/p lumpectomy     Colon:  Colonoscopy (): 0 polyps reported   F/u recommended in 10 years     Gynecologic:  S/p COTY  at 52 y/o secondary to fibroids; ovaries intact    Skin:  Sees derm annually    Other screening:   EGD ():   Impression: The stomach, examined duodenum, cardia and gastric fundus were normal on retroflexion; No endoscopic abnormality to explain the dysphagia     Reproductive History  Age at menarche: 12  Age at first live birth:  19  Menopause: Post Menopausal (early 50s)  Hormone replacement: none     Medical and Surgical History  Pertinent surgical history:   Past Surgical History:   Procedure Laterality Date     "AUGMENTATION MAMMAPLASTY Bilateral 2000    saline    BREAST BIOPSY Left 09/26/2023    BREAST LUMPECTOMY Left 10/31/2023    Procedure: LUMPECTOMY BREAST PELON  LOCALIZED;  Surgeon: Magalie Hubbard MD;  Location: [facility];  Service: Surgical Oncology    COLONOSCOPY      EGD  09/30/2021    Intrinsic moderate stenosis dilated with 54 Bhutanese Littlejohn, erosive gastritis-biopsy negative for H. pylori by Dr. Michele    HYSTERECTOMY      age 51    US BREAST NEEDLE LOC LEFT Left 10/27/2023    US GUIDED BREAST BIOPSY LEFT COMPLETE Left 09/26/2023      Pertinent medical history:  Past Medical History:   Diagnosis Date    A-fib (HCC) 01/29/2024    Anxiety     Breast lump in female     left-  lumpectomy today 10/31/2023    Hypertension     SVT (supraventricular tachycardia) 01/29/2024         Other History:  Height:   Ht Readings from Last 1 Encounters:   04/11/24 5' 3\" (1.6 m)     Weight:   Wt Readings from Last 1 Encounters:   04/11/24 57.6 kg (127 lb)       Relevant Family History   Patient reports non-Ashkenazi Religion ancestry.     Maternal Family History:  Mother: right breast cancer diagnosed at 63, s/p mastectomy + chemo; DCIS of left breast diagnosed at 71, s/p mastectomy (currently 79 y/o)   Uncle: pancreatic cancer diagnose danni 80; DM (currently 81)   Aunt: right breast cancer diagnosed >50, s/p mastectomy, recurrence w/ lung mets (d.83)    Paternal Family History:  Estella had no contact with her late biological father   There is limited information about her paternal family history and structure     Please refer to the scanned pedigree in the Media Tab for a complete family history     *All history is reported as provided by the patient; records are not available for review, except where indicated.     Assessment:  We discussed sporadic, familial and hereditary cancer.  We reviewed that only 5-10% of cancers are considered hereditary. Cancers such as lung cancer, cervical cancer, testicular cancer, and liver cancer very " rarely have a hereditary etiology, and we cannot test for a genetic predisposition for these cancers at this time.  We also discussed the many factors that influence our risk for cancer such as age, environmental exposures, lifestyle choices and family history.      We reviewed the indications suggestive of a hereditary predisposition to cancer.     Estella is unaffected, but is considering genetic testing due to a family history of breast and pancreatic cancer. Although Estella's mother is the most informative person to undergo genetic testing, Estella can consider genetic testing due to the following:   Patient does not have cancer; however, their mother has declined testing and therefore patient   is the most informative person for testing.    Genetic testing is indicated for Estella based on the following criteria:   Meets NCCN V2.2024 Testing Criteria for High-Penetrance Breast Cancer Susceptibility Genes: first-degree relative (mother) with bilateral breast cancer; limited information regarding paternal family history and structure.     The risks, benefits, and limitations of genetic testing were reviewed with the patient, as well as genetic discrimination laws, and possible test results (positive, negative, variants of uncertain significance) and their clinical implications.     If positive for a mutation, options for managing cancer risk including increased surveillance, chemoprevention, and in some cases prophylactic surgery were discussed.  Estella expressed interest in a RRM and BSO. She is not interested in pancreatic cancer screening if indicated.     Estella was informed that if a hereditary cancer syndrome was identified in her, first degree relatives (parents, siblings, and children) have a chance of also inheriting the condition. Genetic testing would allow for predictive genetic testing in other relatives, who may also be at risk depending on their degree of relation.     Billing:  Most individuals pay <$100  for hereditary cancer genetic testing. If insurance covers the cost of the testing, individuals may still pay out of pocket secondary to co-pays, co-insurance, or deductibles. If the cost of the testing exceeds $100, the lab will reach out to the patient via phone or e-mail. The patient will then have the option to proceed with the testing, cancel the testing, or elect the self-pay option of $250.     Estella brought some paperwork regarding Act 284 that amends the Insurance Company Law of 1921 that reportedly requires health insurance to cover breast cancer genetic testing without cost-sharing for high-risk individuals. The act reportedly states that services should not be subject to co-pays, co-insurances, or deductibles.      I informed Estella that I cannot guarantee no out of pocket cost for this test.  I offered to delay the blood draw until we look into Act 284 further but she declined.     Plan: Patient decided to proceed with testing and provided consent.    Summary:     Sample Collection:  The patient's blood sample was drawn in the office on 5/16/24 by genetic counseling assistant Alberto Monterroso    Genetic Testing Preformed: [employer]: Cancer® +RNAinsight® (59 genes): APC, CAM, AXIN2, BAP1, BARD1, BMPR1A, BRCA1, BRCA2, BRIP1, CDH1, CDK4, CDKN1B, CDKN2A, CHEK2, CTNNA1, DICER1, EGLN1, EPCAM, FH, FLCN, GREM1, HOXB13, KIF1B, KIT, MAX, MEN1, MET, MITF, MLH1, MSH2, MSH3, MSH6, MUTYH, NF1, NTHL1, PALB2, PDGFRA PMS2, POLD1, POLE, POT1, PTEN, RAD51C, RAD51D, RB1, RET, SDHA, SDHAF2, SDHB, SDHC, SDHD, SMAD4, SMARCA4, STK11, NVLW497, TP53, TSC1, TSC2, VHL      Result Call Information:  In the event that we need to reach Estella via telephone:  I confirmed the patient's mobile number on file as the best number to call with results  I confirmed with the patient that we can leave a voicemail on her mobile number    Results take approximately 2-3 weeks to complete once test is started.    Estella will be notified via ShareTracker once  results are available.      Additional recommendations for surveillance/medical management will be made pending genetic test results.

## 2024-05-16 NOTE — LETTER
May 16, 2024     Magalie Hubbard MD  [address]  [address] [facility] [address]    Patient: Estella Laird  YOB: 1965  Date of Visit: 2024      Dear Dr. Hubbard:    Thank you for referring Estella Laird to me for evaluation. Below are my notes for this consultation.    If you have questions, please do not hesitate to call me. I look forward to following your patient along with you.         Sincerely,        Hattie Palm        CC: No Recipients        Pre-Test Genetic Counseling Consult Note    Patient Name: Estella Laird   /Age: 1965/59 y.o.  Referring Provider: Magalie Hubbard MD, [facility]    Date of Service: 2024  Genetic Counselor: Hattie Palm MS, [facility]  Interpretation Services: None  Location: In-person consult at [place]  Length of Visit:  50 Minutes    Estella was referred to the [facility] Cancer Risk and Genetic Assessment Program due to her family history of bilateral breast cancer and pancreatic cancer . she presents today to discuss the possibility of a hereditary cancer syndrome, options for genetic testing, and implications for her and her family.     Cancer History and Treatment:   Personal History:   Oncology History    No history exists.       Screening Hx:   Breast:  Breast Imaging:   Breast MRI Bilateral ():   Impression: Benign findings. No MRI evidence of malignancy in either breast.   Breast Density: Scattered fibroglandular density     Left Breast Biopsy (2023): Atypical ductal hyperplasia (ADH) with involvement of intraductal papilloma. -Background breast with florid usual ductal hyperplasia (UDH), sclerosing adenosis, cystic apocrine metaplasia and columnar cell change. -Biopsy site changes identified. -Benign skin. -Rare microcalcifications present, associated with benign ducts.  S/p lumpectomy     Colon:  Colonoscopy (): 0 polyps reported   F/u recommended in 10 years     Gynecologic:  S/p COTY  at 50 y/o secondary to fibroids; ovaries  "intact    Skin:  Sees derm annually    Other screening:   EGD (2023):   Impression: The stomach, examined duodenum, cardia and gastric fundus were normal on retroflexion; No endoscopic abnormality to explain the dysphagia     Reproductive History  Age at menarche: 12  Age at first live birth:  19  Menopause: Post Menopausal (early 50s)  Hormone replacement: none     Medical and Surgical History  Pertinent surgical history:   Past Surgical History:   Procedure Laterality Date   • AUGMENTATION MAMMAPLASTY Bilateral 2000    saline   • BREAST BIOPSY Left 09/26/2023   • BREAST LUMPECTOMY Left 10/31/2023    Procedure: LUMPECTOMY BREAST PELON  LOCALIZED;  Surgeon: Magalie Hubbard MD;  Location: [facility];  Service: Surgical Oncology   • COLONOSCOPY     • EGD  09/30/2021    Intrinsic moderate stenosis dilated with 54 Algerian Littlejohn, erosive gastritis-biopsy negative for H. pylori by Dr. Michele   • HYSTERECTOMY      age 51   • US BREAST NEEDLE LOC LEFT Left 10/27/2023   • US GUIDED BREAST BIOPSY LEFT COMPLETE Left 09/26/2023      Pertinent medical history:  Past Medical History:   Diagnosis Date   • A-fib (HCC) 01/29/2024   • Anxiety    • Breast lump in female     left-  lumpectomy today 10/31/2023   • Hypertension    • SVT (supraventricular tachycardia) 01/29/2024         Other History:  Height:   Ht Readings from Last 1 Encounters:   04/11/24 5' 3\" (1.6 m)     Weight:   Wt Readings from Last 1 Encounters:   04/11/24 57.6 kg (127 lb)       Relevant Family History   Patient reports non-Ashkenazi Orthodoxy ancestry.     Maternal Family History:  Mother: right breast cancer diagnosed at 63, s/p mastectomy + chemo; DCIS of left breast diagnosed at 71, s/p mastectomy (currently 81 y/o)   Uncle: pancreatic cancer diagnose danni 80; DM (currently 81)   Aunt: right breast cancer diagnosed >50, s/p mastectomy, recurrence w/ lung mets (d.83)    Paternal Family History:  Estella had no contact with her late biological father   There is limited " information about her paternal family history and structure     Please refer to the scanned pedigree in the Media Tab for a complete family history     *All history is reported as provided by the patient; records are not available for review, except where indicated.     Assessment:  We discussed sporadic, familial and hereditary cancer.  We reviewed that only 5-10% of cancers are considered hereditary. Cancers such as lung cancer, cervical cancer, testicular cancer, and liver cancer very rarely have a hereditary etiology, and we cannot test for a genetic predisposition for these cancers at this time.  We also discussed the many factors that influence our risk for cancer such as age, environmental exposures, lifestyle choices and family history.      We reviewed the indications suggestive of a hereditary predisposition to cancer.     Estella is unaffected, but is considering genetic testing due to a family history of breast and pancreatic cancer. Although Estella's mother is the most informative person to undergo genetic testing, Estella can consider genetic testing due to the following:   Patient does not have cancer; however, their mother has declined testing and therefore patient   is the most informative person for testing.    Genetic testing is indicated for Estella based on the following criteria:   Meets NCCN V2.2024 Testing Criteria for High-Penetrance Breast Cancer Susceptibility Genes: first-degree relative (mother) with bilateral breast cancer; limited information regarding paternal family history and structure.     The risks, benefits, and limitations of genetic testing were reviewed with the patient, as well as genetic discrimination laws, and possible test results (positive, negative, variants of uncertain significance) and their clinical implications.     If positive for a mutation, options for managing cancer risk including increased surveillance, chemoprevention, and in some cases prophylactic surgery were  discussed.  Estella expressed interest in a RRM and BSO. She is not interested in pancreatic cancer screening if indicated.     Estella was informed that if a hereditary cancer syndrome was identified in her, first degree relatives (parents, siblings, and children) have a chance of also inheriting the condition. Genetic testing would allow for predictive genetic testing in other relatives, who may also be at risk depending on their degree of relation.     Billing:  Most individuals pay <$100 for hereditary cancer genetic testing. If insurance covers the cost of the testing, individuals may still pay out of pocket secondary to co-pays, co-insurance, or deductibles. If the cost of the testing exceeds $100, the lab will reach out to the patient via phone or e-mail. The patient will then have the option to proceed with the testing, cancel the testing, or elect the self-pay option of $250.     Estella brought some paperwork regarding Act 284 that amends the Insurance Company Law of 1921 that reportedly requires health insurance to cover breast cancer genetic testing without cost-sharing for high-risk individuals. The act reportedly states that services should not be subject to co-pays, co-insurances, or deductibles.      I informed Estella that I cannot guarantee no out of pocket cost for this test.  I offered to delay the blood draw until we look into Act 284 further but she declined.     Plan: Patient decided to proceed with testing and provided consent.    Summary:     Sample Collection:  The patient's blood sample was drawn in the office on 5/16/24 by genetic counseling assistant Alberto Monterroso    Genetic Testing Preformed: CustomNext: Cancer® +RNAinsight® (59 genes): APC, CAM, AXIN2, BAP1, BARD1, BMPR1A, BRCA1, BRCA2, BRIP1, CDH1, CDK4, CDKN1B, CDKN2A, CHEK2, CTNNA1, DICER1, EGLN1, EPCAM, FH, FLCN, GREM1, HOXB13, KIF1B, KIT, MAX, MEN1, MET, MITF, MLH1, MSH2, MSH3, MSH6, MUTYH, NF1, NTHL1, PALB2, PDGFRA PMS2, POLD1, POLE,  POT1, PTEN, RAD51C, RAD51D, RB1, RET, SDHA, SDHAF2, SDHB, SDHC, SDHD, SMAD4, SMARCA4, STK11, FCUS357, TP53, TSC1, TSC2, VHL      Result Call Information:  In the event that we need to reach Estella via telephone:  I confirmed the patient's mobile number on file as the best number to call with results  I confirmed with the patient that we can leave a voicemail on her mobile number    Results take approximately 2-3 weeks to complete once test is started.    Estella will be notified via Mobi [employer] once results are available.      Additional recommendations for surveillance/medical management will be made pending genetic test results.

## 2024-05-20 ENCOUNTER — OFFICE VISIT (OUTPATIENT)
Dept: SURGICAL ONCOLOGY | Facility: CLINIC | Age: 59
End: 2024-05-20
Payer: COMMERCIAL

## 2024-05-20 VITALS
HEIGHT: 63 IN | WEIGHT: 131.4 LBS | OXYGEN SATURATION: 98 % | DIASTOLIC BLOOD PRESSURE: 82 MMHG | SYSTOLIC BLOOD PRESSURE: 120 MMHG | TEMPERATURE: 97.8 F | BODY MASS INDEX: 23.28 KG/M2 | HEART RATE: 75 BPM

## 2024-05-20 DIAGNOSIS — Z80.3 FAMILY HISTORY OF BREAST CANCER: ICD-10-CM

## 2024-05-20 DIAGNOSIS — Z12.31 VISIT FOR SCREENING MAMMOGRAM: ICD-10-CM

## 2024-05-20 DIAGNOSIS — N60.92 ATYPICAL DUCTAL HYPERPLASIA OF LEFT BREAST: Primary | ICD-10-CM

## 2024-05-20 PROCEDURE — 99213 OFFICE O/P EST LOW 20 MIN: CPT | Performed by: NURSE PRACTITIONER

## 2024-05-20 NOTE — PROGRESS NOTES
Surgical Oncology Follow Up       [address]  [facility] [place]  [address]  [facility] [address]    Estella Laird  1965  [id number]      Chief Complaint   Patient presents with    Follow-up       Assessment/Plan:  1. Atypical ductal hyperplasia of left breast  - 6 mo f/u visit    2. Family history of breast cancer    3. Visit for screening mammogram  - Mammo screening bilateral w 3d & cad; Future      Discussion/Summary: Patient is a 59-year-old female with a history of atypical ductal hyperplasia and a papilloma of the left breast.  She underwent surgical excision with Dr. Hubbard.  We are now screening her with annual breast MRI staggered with annual mammography.  She had a breast MRI in March 2024 which was BI-RADS 2.  I will make arrangements for a bilateral mammogram to be performed in September.  She also has a family history of both breast and pancreatic cancer and met with oncology genetics last week, testing is pending.  She offers no new complaints today and there are no worrisome findings on today's clinical exam.  We will plan to see her back in 6 months for a follow-up visit or sooner should the need arise.  She was instructed to contact us with any changes or concerns in the interim.  All questions were answered today.    History of Present Illness:     -Interval History: Patient presents today for follow-up visit.  She has not appreciated any changes on self breast exam.  She had a bilateral breast MRI in March which was BI-RADS 2.  Reports no changes in her family history.  She met with oncology genetics last week and testing is pending.    Review of Systems:  Review of Systems   Constitutional:  Negative for chills, fatigue and fever.   Respiratory:  Negative for cough and shortness of breath.    Cardiovascular:  Negative for chest pain.   Hematological:  Negative for adenopathy.   Psychiatric/Behavioral:  Negative for confusion.        Patient  Active Problem List   Diagnosis    Tobacco use disorder    Seasonal allergic rhinitis    Hypertriglyceridemia    Family history of breast cancer    Atopic dermatitis and related condition    Chronic neck pain    Abnormal EKG    Dysphagia    Intraductal papilloma    Atypical ductal hyperplasia of left breast    Family history of pancreatic cancer    Atrial fibrillation (HCC)    Hypertension     Past Medical History:   Diagnosis Date    A-fib (HCC) 01/29/2024    Anxiety     Breast lump in female     left-  lumpectomy today 10/31/2023    Hypertension     SVT (supraventricular tachycardia) 01/29/2024     Past Surgical History:   Procedure Laterality Date    AUGMENTATION MAMMAPLASTY Bilateral 2000    saline    BREAST BIOPSY Left 09/26/2023    BREAST LUMPECTOMY Left 10/31/2023    Procedure: LUMPECTOMY BREAST PELON  LOCALIZED;  Surgeon: Magalie Hubbard MD;  Location: [facility];  Service: Surgical Oncology    COLONOSCOPY      EGD  09/30/2021    Intrinsic moderate stenosis dilated with 54 Greek Littlejohn, erosive gastritis-biopsy negative for H. pylori by Dr. Michele    HYSTERECTOMY      age 51    US BREAST NEEDLE LOC LEFT Left 10/27/2023    US GUIDED BREAST BIOPSY LEFT COMPLETE Left 09/26/2023     Family History   Problem Relation Age of Onset    Breast cancer Mother 63        again at 71 years old.    No Known Problems Father     No Known Problems Daughter     Breast cancer Maternal Aunt 65    Pancreatic cancer Maternal Uncle         80's    No Known Problems Maternal Grandmother     No Known Problems Maternal Grandfather     No Known Problems Paternal Grandmother     No Known Problems Paternal Grandfather      Social History     Socioeconomic History    Marital status: /Civil Union     Spouse name: Not on file    Number of children: Not on file    Years of education: Not on file    Highest education level: Not on file   Occupational History    Occupation: hairdresser   Tobacco Use    Smoking status: Former     Current  packs/day: 0.00     Average packs/day: 0.3 packs/day for 20.0 years (5.0 ttl pk-yrs)     Types: Cigarettes     Start date: 2003     Quit date: 2023     Years since quittin.6    Smokeless tobacco: Never    Tobacco comments:     1 cig a day 10/2023 Quit   Vaping Use    Vaping status: Never Used   Substance and Sexual Activity    Alcohol use: Yes     Alcohol/week: 7.0 standard drinks of alcohol     Types: 7 Glasses of wine per week     Comment: DAILY  1 GLASS WINE    Drug use: Never    Sexual activity: Not on file   Other Topics Concern    Not on file   Social History Narrative    Not on file     Social Determinants of Health     Financial Resource Strain: Not on file   Food Insecurity: Not on file   Transportation Needs: Not on file   Physical Activity: Not on file   Stress: Not on file   Social Connections: Unknown (12/3/2023)    [facility], [facility]    Social Connection and Isolation Panel [NHANES]     Frequency of Communication with Friends and Family: Not on file     Frequency of Social Gatherings with Friends and Family: Not on file     Attends Cheondoism Services: Not on file     Active Member of Clubs or Organizations: Not on file     Attends Club or Organization Meetings: Not on file     Marital Status:    Intimate Partner Violence: Not At Risk (12/3/2023)    Received from [facility], [facility]    Humiliation, Afraid, Rape, and Kick questionnaire     Fear of Current or Ex-Partner: No     Emotionally Abused: No     Physically Abused: No     Sexually Abused: No   Housing Stability: Not on file       Current Outpatient Medications:     clobetasol (TEMOVATE) 0.05 % ointment, APPLY TWICE A DAY TO AFFECTED AREAS ON HANDS FOR 2 WEEKS THEN DAILY FOR 2 WEEKS THEN 3 TIMES A WEEK AS NEEDED, Disp: , Rfl:     clonazePAM (KlonoPIN) 0.5 mg tablet, Take 0.5 mg by mouth daily at bedtime, Disp: , Rfl:     desonide  (DESOWEN) 0.05 % cream, Apply topically if needed, Disp: , Rfl:     diltiazem (CARDIZEM SR) 60 mg 12 hr capsule, Take 1 capsule (60 mg total) by mouth 2 (two) times a day, Disp: 180 capsule, Rfl: 1    Miconazole-Zinc Oxide-Petrolat 0.25-15-81.35 % OINT, if needed (for rash on lips), Disp: , Rfl:     tacrolimus (PROTOPIC) 0.1 % ointment, APPLY TO AFFECTED AREA S) TWO TIMES A DAY AS NEEDED, Disp: , Rfl:     valACYclovir (VALTREX) 1,000 mg tablet, if needed, Disp: , Rfl:     ALPRAZolam (XANAX) 0.25 mg tablet, Take 0.25 mg by mouth Three times daily as needed, Disp: , Rfl:     apixaban (ELIQUIS) 5 mg, Take 1 tablet (5 mg total) by mouth 2 (two) times a day, Disp: 60 tablet, Rfl: 5    famotidine (PEPCID) 40 MG tablet, Take 1 tablet (40 mg total) by mouth 2 (two) times a day (Patient taking differently: Take 40 mg by mouth 2 (two) times a day as needed), Disp: 60 tablet, Rfl: 5  Allergies   Allergen Reactions    Celebrex [Celecoxib] Tongue Swelling    Sulfa Antibiotics Hives and Tongue Swelling    Amlodipine Other (See Comments)     Possible  ankle swelling on 5 mg    Lisinopril Other (See Comments)     dizziness    Methocarbamol Other (See Comments)     Nightmares    Other Sneezing     Seasonal allergies    Sulfamethoxazole-Trimethoprim Hives     Vitals:    05/20/24 0903   BP: 120/82   Pulse: 75   Temp: 97.8 °F (36.6 °C)   SpO2: 98%       Physical Exam  Vitals reviewed.   Constitutional:       Appearance: Normal appearance.   HENT:      Head: Normocephalic and atraumatic.   Pulmonary:      Effort: Pulmonary effort is normal.   Chest:   Breasts:     Right: No swelling, bleeding, inverted nipple, mass, nipple discharge, skin change or tenderness.      Left: Skin change (surgical scars) present. No swelling, bleeding, inverted nipple, mass, nipple discharge or tenderness.      Comments: Bilateral implants present  Lymphadenopathy:      Upper Body:      Right upper body: No supraclavicular or axillary adenopathy.      Left  upper body: No supraclavicular or axillary adenopathy.   Skin:     General: Skin is warm and dry.   Neurological:      General: No focal deficit present.      Mental Status: She is alert and oriented to person, place, and time.   Psychiatric:         Mood and Affect: Mood normal.         Advance Care Planning/Advance Directives:  Discussed disease status and treatment goals with the patient.

## 2024-05-28 ENCOUNTER — TELEPHONE (OUTPATIENT)
Dept: GENETICS | Facility: CLINIC | Age: 59
End: 2024-05-28

## 2024-05-28 NOTE — TELEPHONE ENCOUNTER
Spoke with billing department at [facility] to determine if Estella is expected to have an OOP charge.     The representative said that the billing process is pending. She sent a message to a  to investigate further.

## 2024-05-28 NOTE — TELEPHONE ENCOUNTER
LVM informing Estella that her genetic test results are negative.     Please see [employer] message from 5/28/24 for post-test consult note.     I asked Estella to call [phone] if she had any updates to share about the [employer] bill.

## 2024-06-17 DIAGNOSIS — I48.0 PAROXYSMAL ATRIAL FIBRILLATION (HCC): ICD-10-CM

## 2024-06-17 RX ORDER — DILTIAZEM HYDROCHLORIDE 60 MG/1
60 CAPSULE, EXTENDED RELEASE ORAL 2 TIMES DAILY
Qty: 180 CAPSULE | Refills: 1 | Status: SHIPPED | OUTPATIENT
Start: 2024-06-17

## 2024-06-17 NOTE — TELEPHONE ENCOUNTER
Lexis Capps, I received a letter from my new prescription plan and they are requesting that a 90 day supply of my Diltiazem HCL ER 60 mg Cp12   (Prescription number [id number])   be sent to [facility] to save me money.  I use the [address] [place] [address] Pharmacy inside of [facility] Their phone number is [phone].  Thank you, Estella Laird     Medication: Diltiazem 60mg    Dose/Frequency: 1 capsule twice a day    Quantity: 180 capsules    Pharmacy: [facility] in [facility]    Office:   [x] PCP/Provider - Myrna Rodriguez, DO  [] Speciality/Provider -     Does the patient have enough for 3 days?   [x] Yes   [] No - Send as HP to POD

## 2024-07-12 ENCOUNTER — NURSE TRIAGE (OUTPATIENT)
Age: 59
End: 2024-07-12

## 2024-07-12 NOTE — TELEPHONE ENCOUNTER
"Pt called stating she had two more episodes of tachycardia yesterday. The first one occurred while food shopping lasting a few minutes, and she woke at 4 am with another episode associated with dizziness, and causing anxiety    /90 6 am this morning    She is scheduled to see you on 7/30/24, but would appreciate any thoughts before the appt.   She is taking Cardizem 60 mg BID  Lv message if no answer      Answer Assessment - Initial Assessment Questions  1. DESCRIPTION: \"Please describe your heart rate or heartbeat that you are having\" (e.g., fast/slow, regular/irregular, skipped or extra beats, \"palpitations\")      Episodes of SVT  2. ONSET: \"When did it start?\" (Minutes, hours or days)       last week  3. DURATION: \"How long does it last\" (e.g., seconds, minutes, hours)      Lasts 2 minutes  4. PATTERN \"Does it come and go, or has it been constant since it started?\"  \"Does it get worse with exertion?\"   \"Are you feeling it now?\"      No particular onset  6. HEART RATE: \"Can you tell me your heart rate?\" \"How many beats in 15 seconds?\"  (Note: not all patients can do this)        107  7. RECURRENT SYMPTOM: \"Have you ever had this before?\" If Yes, ask: \"When was the last time?\" and \"What happened that time?\"       Last week  9. CARDIAC HISTORY: \"Do you have any history of heart disease?\" (e.g., heart attack, angina, bypass surgery, angioplasty, arrhythmia)       Afib, SVT  10. OTHER SYMPTOMS: \"Do you have any other symptoms?\" (e.g., dizziness, chest pain, sweating, difficulty breathing)        Dizziness, anxiety    Protocols used: Heart Rate and Heartbeat Questions-ADULT-[place]    "

## 2024-07-17 ENCOUNTER — OFFICE VISIT (OUTPATIENT)
Dept: CARDIOLOGY CLINIC | Facility: CLINIC | Age: 59
End: 2024-07-17
Payer: COMMERCIAL

## 2024-07-17 ENCOUNTER — TELEPHONE (OUTPATIENT)
Age: 59
End: 2024-07-17

## 2024-07-17 VITALS
BODY MASS INDEX: 22.32 KG/M2 | OXYGEN SATURATION: 98 % | SYSTOLIC BLOOD PRESSURE: 116 MMHG | HEIGHT: 63 IN | WEIGHT: 126 LBS | DIASTOLIC BLOOD PRESSURE: 80 MMHG | HEART RATE: 89 BPM

## 2024-07-17 DIAGNOSIS — E78.1 HYPERTRIGLYCERIDEMIA: ICD-10-CM

## 2024-07-17 DIAGNOSIS — I10 PRIMARY HYPERTENSION: ICD-10-CM

## 2024-07-17 DIAGNOSIS — F17.200 TOBACCO USE DISORDER: ICD-10-CM

## 2024-07-17 DIAGNOSIS — I48.0 PAROXYSMAL ATRIAL FIBRILLATION (HCC): Primary | ICD-10-CM

## 2024-07-17 PROCEDURE — 99214 OFFICE O/P EST MOD 30 MIN: CPT | Performed by: STUDENT IN AN ORGANIZED HEALTH CARE EDUCATION/TRAINING PROGRAM

## 2024-07-17 RX ORDER — METHYLPREDNISOLONE ACETATE 40 MG/ML
INJECTION, SUSPENSION INTRA-ARTICULAR; INTRALESIONAL; INTRAMUSCULAR; SOFT TISSUE
COMMUNITY
Start: 2024-05-23

## 2024-07-17 RX ORDER — DILTIAZEM HYDROCHLORIDE 60 MG/1
60 CAPSULE, EXTENDED RELEASE ORAL 2 TIMES DAILY
Start: 2024-07-17

## 2024-07-17 NOTE — PROGRESS NOTES
Cardiology Consultation     Estella Laird  [id number]  1965  [facility] CARDIOLOGY [place]  [address] [place] [id number]      1. Paroxysmal atrial fibrillation (HCC)  diltiazem (CARDIZEM SR) 60 mg 12 hr capsule      2. Primary hypertension        3. Hypertriglyceridemia        4. Tobacco use disorder                Discussion/Summary:  Paroxysmal atrial fibrillation  -Diagnosed during hospitalization at the end of January 2024  - [id number] score of 2  -Previously anticoagulated on Eliquis 5 mg twice daily-> patient discontinued and is monitoring for recurrent A-fib with her [employer] mobile  - Patient spontaneously converted back to sinus rhythm prior to her MARIAH cardioversion  - Unable to tolerate propranolol, bisoprolol and Cardizem ER  - Currently on Cardizem  mg in a.m. and 60 mg in p.m.  - Plan to reduce back to 60 mg twice daily and then stop to see if it improves her symptoms  Hypertension  - Previously on valsartan 160 mg daily  -Currently on Cardizem 120 mg in a.m. and 60 mg in p.m.  - Blood pressure is well-controlled today  - Plan to stop Cardizem and go back on valsartan  Hyperlipidemia  - Not on statin currently  - Lipid profile 6/29/2023 showed total cholesterol 165, triglycerides 318, HDL 56, LDL 45  Former tobacco use  - Quit in Sept 2023  Alcohol use  - Drinks about 2 glasses of wine each night with dinner  Anxiety  - Recommended she follow-up with her PCP and psychologist for further management  History of breast cancer    Follow-up in 6 months.    History of Present Illness:  Estella Laird is a 59 y.o. year old female with a past medical history of paroxysmal atrial fibrillation, hypertension, hyperlipidemia, history of breast cancer, anxiety, tobacco use, and alcohol use.    2/19/2024: She reports feeling okay today.  She has been having some panic attacks recently.  Reports feeling some palpitations with panic attacks, but denies feeling any recurrent atrial fibrillation  recently.  She is doing okay on the bisoprolol, but reports feeling of fatigue/crushing sensation about 2 hours after taking the dose.  She is going to try taking it before going to bed to see if it helps with her symptoms.    4/11/2024: She reports feeling very anxious recently.  She has had many life events recently that has caused a lot of stress.  She feels all these problems are now weighing on her mind regularly and making her feel very anxious.  She reports having palpitations on a regular basis and was feeling her heart beating quickly.  She also had an ED visit last week for the palpitations/racing heart.     Interval history:  Patient reports having worsening palpitations.  Through [employer] messaging, we increased her Cardizem to 120 mg in the morning and 60 mg in the evening.  The palpitations continued, so she was placed to increase her Cardizem to 120 mg twice daily.  On the higher doses of Cardizem, she reports having increased fatigue, diarrhea, and overall feeling generally unwell.    Patient Active Problem List   Diagnosis    Tobacco use disorder    Seasonal allergic rhinitis    Hypertriglyceridemia    Family history of breast cancer    Atopic dermatitis and related condition    Chronic neck pain    Abnormal EKG    Dysphagia    Intraductal papilloma    Atypical ductal hyperplasia of left breast    Family history of pancreatic cancer    Atrial fibrillation (HCC)    Hypertension     Past Medical History:   Diagnosis Date    A-fib (HCC) 01/29/2024    Anxiety     Breast lump in female     left-  lumpectomy today 10/31/2023    Hypertension     SVT (supraventricular tachycardia) 01/29/2024     Social History     Socioeconomic History    Marital status: /Civil Union     Spouse name: Not on file    Number of children: Not on file    Years of education: Not on file    Highest education level: Not on file   Occupational History    Occupation: hairdresser   Tobacco Use    Smoking status: Former      Current packs/day: 0.00     Average packs/day: 0.3 packs/day for 20.0 years (5.0 ttl pk-yrs)     Types: Cigarettes     Start date: 2003     Quit date: 2023     Years since quittin.8    Smokeless tobacco: Never    Tobacco comments:     1 cig a day 10/2023 Quit   Vaping Use    Vaping status: Never Used   Substance and Sexual Activity    Alcohol use: Yes     Alcohol/week: 7.0 standard drinks of alcohol     Types: 7 Glasses of wine per week     Comment: DAILY  1 GLASS WINE    Drug use: Never    Sexual activity: Not on file   Other Topics Concern    Not on file   Social History Narrative    Not on file     Social Determinants of Health     Financial Resource Strain: Not on file   Food Insecurity: Not on file   Transportation Needs: Not on file   Physical Activity: Not on file   Stress: Not on file   Social Connections: Unknown (12/3/2023)    [facility]    Social Connection and Isolation Panel [NHANES]     Frequency of Communication with Friends and Family: Not on file     Frequency of Social Gatherings with Friends and Family: Not on file     Attends Gnosticist Services: Not on file     Active Member of Clubs or Organizations: Not on file     Attends Club or Organization Meetings: Not on file     Marital Status:    Intimate Partner Violence: Not At Risk (12/3/2023)    [facility]    Humiliation, Afraid, Rape, and Kick questionnaire     Fear of Current or Ex-Partner: No     Emotionally Abused: No     Physically Abused: No     Sexually Abused: No   Housing Stability: Not on file      Family History   Problem Relation Age of Onset    Breast cancer Mother 63        again at 71 years old.    No Known Problems Father     No Known Problems Daughter     Breast cancer Maternal Aunt 65    Pancreatic cancer Maternal Uncle         80's    No Known Problems Maternal Grandmother     No Known  Problems Maternal Grandfather     No Known Problems Paternal Grandmother     No Known Problems Paternal Grandfather      Past Surgical History:   Procedure Laterality Date    AUGMENTATION MAMMAPLASTY Bilateral 2000    saline    BREAST BIOPSY Left 09/26/2023    BREAST LUMPECTOMY Left 10/31/2023    Procedure: LUMPECTOMY BREAST PELON  LOCALIZED;  Surgeon: Magalie Hubbard MD;  Location: [facility];  Service: Surgical Oncology    COLONOSCOPY      EGD  09/30/2021    Intrinsic moderate stenosis dilated with 54 [address]john, erosive gastritis-biopsy negative for H. pylori by Dr. Michele    HYSTERECTOMY      age 51    US BREAST NEEDLE LOC LEFT Left 10/27/2023    US GUIDED BREAST BIOPSY LEFT COMPLETE Left 09/26/2023       Current Outpatient Medications:     ALPRAZolam (XANAX) 0.25 mg tablet, Take 0.25 mg by mouth Three times daily as needed, Disp: , Rfl:     clobetasol (TEMOVATE) 0.05 % ointment, if needed, Disp: , Rfl:     clonazePAM (KlonoPIN) 0.5 mg tablet, Take 0.5 mg by mouth daily at bedtime, Disp: , Rfl:     desonide (DESOWEN) 0.05 % cream, Apply topically if needed for irritation, Disp: , Rfl:     diltiazem (CARDIZEM SR) 60 mg 12 hr capsule, Take 1 capsule (60 mg total) by mouth 2 (two) times a day, Disp: , Rfl:     famotidine (PEPCID) 40 MG tablet, Take 1 tablet (40 mg total) by mouth 2 (two) times a day (Patient taking differently: Take 40 mg by mouth 2 (two) times a day as needed), Disp: 60 tablet, Rfl: 2    methylPREDNISolone acetate (DEPO-MEDROL) 40 mg/mL injection, administered, Disp: , Rfl:     Miconazole-Zinc Oxide-Petrolat 0.25-15-81.35 % OINT, if needed (for rash on lips), Disp: , Rfl:     tacrolimus (PROTOPIC) 0.1 % ointment, APPLY TO AFFECTED AREA S) TWO TIMES A DAY AS NEEDED, Disp: , Rfl:     valACYclovir (VALTREX) 1,000 mg tablet, if needed, Disp: , Rfl:     apixaban (ELIQUIS) 5 mg, Take 1 tablet (5 mg total) by mouth 2 (two) times a day (Patient not taking: Reported on 7/17/2024), Disp: 60 tablet, Rfl:  5  Allergies   Allergen Reactions    Celebrex [Celecoxib] Tongue Swelling    Sulfa Antibiotics Hives and Tongue Swelling    Amlodipine Other (See Comments)     Possible  ankle swelling on 5 mg    Lisinopril Other (See Comments)     dizziness    Methocarbamol Other (See Comments)     Nightmares    Other Sneezing     Seasonal allergies    Sulfamethoxazole-Trimethoprim Hives         Labs:  Lab Results   Component Value Date    ALT 16 04/03/2024    AST 15 04/03/2024    BUN 10 04/03/2024    CALCIUM 9.4 04/03/2024     04/03/2024    CO2 27 04/03/2024    CREATININE 0.67 04/03/2024    HCT 48.7 (H) 04/03/2024    HGB 16.6 (H) 04/03/2024    MG 2.1 04/03/2024     (H) 04/03/2024    K 3.6 04/03/2024    WBC 7.76 04/03/2024       Imaging: Cardioversion    Result Date: 1/30/2024  Narrative: This procedure could not be completed and was aborted.    Echo complete w/ contrast if indicated    Result Date: 1/30/2024  Narrative: Technically difficult but adequate transthoracic echocardiogram study limited by body habitus and lung interference.  Patient noted to be in atrial fibrillation rhythm. Mild concentric left ventricle hypertrophy, normal left trickle systolic function, indeterminate diastolic function.  Left ventricular ejection fraction is estimated around 60%. Normal right ventricle size and systolic function. Normal left and right atrial cavity size. Mild aortic valvular sclerosis, no aortic valve stenosis or regurgitation. Mitral valve leaflet sclerosis, trace mitral valve regurgitation. Trace tricuspid valve regurgitation. No obvious pulmonary hypertension. No pericardial effusion. Normal aortic root size. No previous echocardiogram is available for comparison.     X-ray chest 1 view portable    Result Date: 1/30/2024  Narrative: CHEST INDICATION:   chest pain. COMPARISON: October 19, 2023. EXAM PERFORMED/VIEWS:  XR CHEST PORTABLE FINDINGS: Cardiomediastinal silhouette appears unremarkable. The lungs are clear.  No  "pneumothorax or pleural effusion. Osseous structures appear within normal limits for patient age.     Impression: No acute disease in the chest. Workstation performed: [id number]       EC2024: Normal sinus rhythm, borderline LA enlargement, incomplete RBBB, poor R wave progression    Review of Systems:  Review of Systems   Constitutional:  Negative for chills, diaphoresis, fatigue and fever.   HENT:  Negative for congestion.    Eyes:  Negative for photophobia and visual disturbance.   Respiratory:  Negative for chest tightness and shortness of breath.    Cardiovascular:  Negative for chest pain, palpitations and leg swelling.   Gastrointestinal:  Negative for abdominal distention, abdominal pain, diarrhea, nausea and vomiting.   Genitourinary:  Negative for difficulty urinating and dysuria.   Musculoskeletal:  Negative for arthralgias, gait problem and joint swelling.   Skin:  Negative for color change, pallor and rash.   Neurological:  Negative for dizziness, syncope, numbness and headaches.   Psychiatric/Behavioral:  Negative for agitation, behavioral problems and confusion. The patient is nervous/anxious.          Vitals:    24 1405   BP: 116/80   Pulse: 89   SpO2: 98%          Vitals:    24 1405   Weight: 57.2 kg (126 lb)         Height: 5' 3\" (160 cm)     Physical Exam:  General appearance:  Appears stated age, alert, well appearing and in no distress  HEENT:  PERRLA, EOMI, no scleral icterus, no conjunctival pallor  NECK:  Supple, No elevated JVP, no thyromegaly, no carotid bruits  HEART:  Regular rate and rhythm, normal S1/S2, no S3/S4, no murmur or rub  LUNGS:  Clear to auscultation bilaterally  ABDOMEN:  Soft, non-tender, positive bowel sounds, no rebound or guarding, no organomegaly   EXTREMITIES:  No edema  VASCULAR:  Normal pedal pulses   SKIN: No lesions or rashes on exposed skin  NEURO:  CN II-XII intact, no focal deficits   "

## 2024-07-17 NOTE — PATIENT INSTRUCTIONS
Go back to taking your Cardizem 60 mg twice a day for Thursday and Friday.  On Saturday stop taking your Cardizem and restart your valsartan.  You may noticed increased heart rates on Saturday and Sunday, but hopefully by Monday you should start to feel better.  If things are continuing to get worse on Monday then I would recommend stopping the valsartan go back on the Cardizem 60 mg twice a day.

## 2024-07-17 NOTE — TELEPHONE ENCOUNTER
Patient called today to relay symptoms she believes are related to her cardizem.  She has recently increased her dose of cardizem due to sinus tachycardia.    She is c/o nausea, chills, lethargy. She tested negative for covid, does not have a fever.  She requested a sooner ov with Dr Capps. I scheduled her today at 2:20 in the [place] office.     I spoke with Maritza in the [place] office, making her aware of the same day appointment.

## 2024-09-16 ENCOUNTER — OFFICE VISIT (OUTPATIENT)
Dept: CARDIOLOGY CLINIC | Facility: CLINIC | Age: 59
End: 2024-09-16
Payer: COMMERCIAL

## 2024-09-16 VITALS — BODY MASS INDEX: 22.82 KG/M2 | DIASTOLIC BLOOD PRESSURE: 70 MMHG | SYSTOLIC BLOOD PRESSURE: 112 MMHG | WEIGHT: 128.8 LBS

## 2024-09-16 DIAGNOSIS — E78.1 HYPERTRIGLYCERIDEMIA: ICD-10-CM

## 2024-09-16 DIAGNOSIS — F17.200 TOBACCO USE DISORDER: ICD-10-CM

## 2024-09-16 DIAGNOSIS — I10 PRIMARY HYPERTENSION: ICD-10-CM

## 2024-09-16 DIAGNOSIS — I48.0 PAROXYSMAL ATRIAL FIBRILLATION (HCC): Primary | ICD-10-CM

## 2024-09-16 PROCEDURE — 93000 ELECTROCARDIOGRAM COMPLETE: CPT | Performed by: STUDENT IN AN ORGANIZED HEALTH CARE EDUCATION/TRAINING PROGRAM

## 2024-09-16 PROCEDURE — 99214 OFFICE O/P EST MOD 30 MIN: CPT | Performed by: STUDENT IN AN ORGANIZED HEALTH CARE EDUCATION/TRAINING PROGRAM

## 2024-09-16 NOTE — PROGRESS NOTES
Cardiology Consultation     Estella Laird  [id number]  1965  [facility] CARDIOLOGY [place]  [address] [facility] [address] [address]      1. Paroxysmal atrial fibrillation (HCC)  POCT ECG      2. Primary hypertension        3. Hypertriglyceridemia        4. Tobacco use disorder            Discussion/Summary:  Paroxysmal atrial fibrillation  -Diagnosed during hospitalization at the end of January 2024  - [id number] score of 2  -Previously anticoagulated on Eliquis 5 mg twice daily-> patient discontinued and is monitoring for recurrent A-fib with her [employer] mobile  - Patient spontaneously converted back to sinus rhythm prior to her MARIAH cardioversion  - Unable to tolerate propranolol, bisoprolol, Cardizem ER and Cardizem SR  - Currently off of AV fidencio blocking agents  Hypertension  -Previously on valsartan 160 mg daily  -Changed to Cardizem and then beta-blockers after being diagnosed with atrial fibrillation, however unable to tolerate these medications  - Blood pressure well-controlled off of all antihypertensives currently  - Will continue to monitor if blood pressure rises, can consider resuming valsartan or Cardizem  Hyperlipidemia  - Not on statin currently  - Lipid profile 6/29/2023 showed total cholesterol 165, triglycerides 318, HDL 56, LDL 45  Former tobacco use  - Quit in Sept 2023  Alcohol use  - Drinks about 2 glasses of wine each night with dinner  Anxiety  - Recommended she follow-up with her PCP and psychologist for further management  History of breast cancer    Follow-up in 1 year.    History of Present Illness:  Estella Laird is a 59 y.o. year old female with a past medical history of paroxysmal atrial fibrillation, hypertension, hyperlipidemia, history of breast cancer, anxiety, tobacco use, and alcohol use.    2/19/2024: She reports feeling okay today.  She has been having some panic attacks recently.  Reports feeling some palpitations with panic attacks, but denies feeling any recurrent  atrial fibrillation recently.  She is doing okay on the bisoprolol, but reports feeling of fatigue/crushing sensation about 2 hours after taking the dose.  She is going to try taking it before going to bed to see if it helps with her symptoms.    4/11/2024: She reports feeling very anxious recently.  She has had many life events recently that has caused a lot of stress.  She feels all these problems are now weighing on her mind regularly and making her feel very anxious.  She reports having palpitations on a regular basis and was feeling her heart beating quickly.  She also had an ED visit last week for the palpitations/racing heart.     7/17/2024: Patient reports having worsening palpitations.  Through [employer] messaging, we increased her Cardizem to 120 mg in the morning and 60 mg in the evening.  The palpitations continued, so she was placed to increase her Cardizem to 120 mg twice daily.  On the higher doses of Cardizem, she reports having increased fatigue, diarrhea, and overall feeling generally unwell.    Interval history:  She discontinued her Cardizem and reports her blood pressures were well-controlled controlled, so has not been taking her valsartan as well.  No recurrent episodes of atrial fibrillation on her [employer] mobile at home.  She reports feeling well today and has no complaints.  Reports feeling a lot better being off of all of her cardiac medications now.    Patient Active Problem List   Diagnosis    Tobacco use disorder    Seasonal allergic rhinitis    Hypertriglyceridemia    Family history of breast cancer    Atopic dermatitis and related condition    Chronic neck pain    Abnormal EKG    Dysphagia    Intraductal papilloma    Atypical ductal hyperplasia of left breast    Family history of pancreatic cancer    Atrial fibrillation (HCC)    Hypertension     Past Medical History:   Diagnosis Date    A-fib (HCC) 01/29/2024    Anxiety     Breast lump in female     left-  lumpectomy today 10/31/2023     Hypertension     SVT (supraventricular tachycardia) 2024     Social History     Socioeconomic History    Marital status: /Civil Union     Spouse name: Not on file    Number of children: Not on file    Years of education: Not on file    Highest education level: Not on file   Occupational History    Occupation: hairdresser   Tobacco Use    Smoking status: Former     Current packs/day: 0.00     Average packs/day: 0.3 packs/day for 20.0 years (5.0 ttl pk-yrs)     Types: Cigarettes     Start date: 2003     Quit date: 2023     Years since quittin.9    Smokeless tobacco: Never    Tobacco comments:     1 cig a day 10/2023 Quit   Vaping Use    Vaping status: Never Used   Substance and Sexual Activity    Alcohol use: Yes     Alcohol/week: 7.0 standard drinks of alcohol     Types: 7 Glasses of wine per week     Comment: DAILY  1 GLASS WINE    Drug use: Never    Sexual activity: Not on file   Other Topics Concern    Not on file   Social History Narrative    Not on file     Social Determinants of Health     Financial Resource Strain: Not on file   Food Insecurity: Not on file   Transportation Needs: Not on file   Physical Activity: Not on file   Stress: Not on file   Social Connections: Unknown (12/3/2023)    [facility]    Social Connection and Isolation Panel [NHANES]     Frequency of Communication with Friends and Family: Not on file     Frequency of Social Gatherings with Friends and Family: Not on file     Attends Yarsani Services: Not on file     Active Member of Clubs or Organizations: Not on file     Attends Club or Organization Meetings: Not on file     Marital Status:    Intimate Partner Violence: Not At Risk (12/3/2023)    [facility]    Humiliation, Afraid, Rape, and Kick questionnaire     Fear of Current or Ex-Partner: No     Emotionally Abused: No      Physically Abused: No     Sexually Abused: No   Housing Stability: Not on file      Family History   Problem Relation Age of Onset    Breast cancer Mother 63        again at 71 years old.    No Known Problems Father     No Known Problems Daughter     Breast cancer Maternal Aunt 65    Pancreatic cancer Maternal Uncle         80's    No Known Problems Maternal Grandmother     No Known Problems Maternal Grandfather     No Known Problems Paternal Grandmother     No Known Problems Paternal Grandfather      Past Surgical History:   Procedure Laterality Date    AUGMENTATION MAMMAPLASTY Bilateral 2000    saline    BREAST BIOPSY Left 09/26/2023    BREAST LUMPECTOMY Left 10/31/2023    Procedure: LUMPECTOMY BREAST PELON  LOCALIZED;  Surgeon: Magalie Hubbard MD;  Location: [facility];  Service: Surgical Oncology    COLONOSCOPY      EGD  09/30/2021    Intrinsic moderate stenosis dilated with 54 [address], erosive gastritis-biopsy negative for H. pylori by Dr. Michele    HYSTERECTOMY      age 51    US BREAST NEEDLE LOC LEFT Left 10/27/2023    US GUIDED BREAST BIOPSY LEFT COMPLETE Left 09/26/2023       Current Outpatient Medications:     clobetasol (TEMOVATE) 0.05 % ointment, if needed, Disp: , Rfl:     clonazePAM (KlonoPIN) 0.5 mg tablet, Take 0.5 mg by mouth daily at bedtime, Disp: , Rfl:     desonide (DESOWEN) 0.05 % cream, Apply topically if needed for irritation, Disp: , Rfl:     famotidine (PEPCID) 40 MG tablet, Take 1 tablet (40 mg total) by mouth 2 (two) times a day (Patient taking differently: Take 40 mg by mouth 2 (two) times a day as needed), Disp: 60 tablet, Rfl: 2    methylPREDNISolone acetate (DEPO-MEDROL) 40 mg/mL injection, administered, Disp: , Rfl:     Miconazole-Zinc Oxide-Petrolat 0.25-15-81.35 % OINT, if needed (for rash on lips), Disp: , Rfl:     tacrolimus (PROTOPIC) 0.1 % ointment, APPLY TO AFFECTED AREA S) TWO TIMES A DAY AS NEEDED, Disp: , Rfl:     valACYclovir (VALTREX) 1,000 mg tablet, if needed, Disp:  , Rfl:     ALPRAZolam (XANAX) 0.25 mg tablet, Take 0.25 mg by mouth Three times daily as needed, Disp: , Rfl:   Allergies   Allergen Reactions    Celebrex [Celecoxib] Tongue Swelling    Sulfa Antibiotics Hives and Tongue Swelling    Amlodipine Other (See Comments)     Possible  ankle swelling on 5 mg    Lisinopril Other (See Comments)     dizziness    Methocarbamol Other (See Comments)     Nightmares    Other Sneezing     Seasonal allergies    Sulfamethoxazole-Trimethoprim Hives         Labs:  Lab Results   Component Value Date    ALT 16 04/03/2024    AST 15 04/03/2024    BUN 10 04/03/2024    CALCIUM 9.4 04/03/2024     04/03/2024    CO2 27 04/03/2024    CREATININE 0.67 04/03/2024    HCT 48.7 (H) 04/03/2024    HGB 16.6 (H) 04/03/2024    MG 2.1 04/03/2024     (H) 04/03/2024    K 3.6 04/03/2024    WBC 7.76 04/03/2024       Imaging: Cardioversion    Result Date: 1/30/2024  Narrative: This procedure could not be completed and was aborted.    Echo complete w/ contrast if indicated    Result Date: 1/30/2024  Narrative: Technically difficult but adequate transthoracic echocardiogram study limited by body habitus and lung interference.  Patient noted to be in atrial fibrillation rhythm. Mild concentric left ventricle hypertrophy, normal left trickle systolic function, indeterminate diastolic function.  Left ventricular ejection fraction is estimated around 60%. Normal right ventricle size and systolic function. Normal left and right atrial cavity size. Mild aortic valvular sclerosis, no aortic valve stenosis or regurgitation. Mitral valve leaflet sclerosis, trace mitral valve regurgitation. Trace tricuspid valve regurgitation. No obvious pulmonary hypertension. No pericardial effusion. Normal aortic root size. No previous echocardiogram is available for comparison.     X-ray chest 1 view portable    Result Date: 1/30/2024  Narrative: CHEST INDICATION:   chest pain. COMPARISON: October 19, 2023. EXAM  PERFORMED/VIEWS:  XR CHEST PORTABLE FINDINGS: Cardiomediastinal silhouette appears unremarkable. The lungs are clear.  No pneumothorax or pleural effusion. Osseous structures appear within normal limits for patient age.     Impression: No acute disease in the chest. Workstation performed: [id number]       EC2024: Normal sinus rhythm, low voltage QRS, poor R wave progression    Review of Systems:  Review of Systems   Constitutional:  Negative for chills, diaphoresis, fatigue and fever.   HENT:  Negative for congestion.    Eyes:  Negative for photophobia and visual disturbance.   Respiratory:  Negative for chest tightness and shortness of breath.    Cardiovascular:  Negative for chest pain, palpitations and leg swelling.   Gastrointestinal:  Negative for abdominal distention, abdominal pain, diarrhea, nausea and vomiting.   Genitourinary:  Negative for difficulty urinating and dysuria.   Musculoskeletal:  Negative for arthralgias, gait problem and joint swelling.   Skin:  Negative for color change, pallor and rash.   Neurological:  Negative for dizziness, syncope, numbness and headaches.   Psychiatric/Behavioral:  Negative for agitation, behavioral problems and confusion. The patient is not nervous/anxious.        Vitals:    24 0849   BP: 112/70        Vitals:    24 0849   Weight: 58.4 kg (128 lb 12.8 oz)             Physical Exam:  General appearance:  Appears stated age, alert, well appearing and in no distress  HEENT:  PERRLA, EOMI, no scleral icterus, no conjunctival pallor  NECK:  Supple, No elevated JVP, no thyromegaly, no carotid bruits  HEART:  Regular rate and rhythm, normal S1/S2, no S3/S4, no murmur or rub  LUNGS:  Clear to auscultation bilaterally  ABDOMEN:  Soft, non-tender, positive bowel sounds, no rebound or guarding, no organomegaly   EXTREMITIES:  No edema  VASCULAR:  Normal pedal pulses   SKIN: No lesions or rashes on exposed skin  NEURO:  CN II-XII intact, no focal deficits

## 2024-09-23 ENCOUNTER — HOSPITAL ENCOUNTER (OUTPATIENT)
Dept: MAMMOGRAPHY | Facility: MEDICAL CENTER | Age: 59
Discharge: HOME | End: 2024-09-23
Payer: COMMERCIAL

## 2024-09-23 VITALS — BODY MASS INDEX: 22.68 KG/M2 | WEIGHT: 128 LBS | HEIGHT: 63 IN

## 2024-09-23 DIAGNOSIS — Z12.31 VISIT FOR SCREENING MAMMOGRAM: ICD-10-CM

## 2024-09-23 PROCEDURE — 77067 SCR MAMMO BI INCL CAD: CPT

## 2024-09-23 PROCEDURE — 77063 BREAST TOMOSYNTHESIS BI: CPT

## 2024-11-15 ENCOUNTER — HOSPITAL ENCOUNTER (OUTPATIENT)
Dept: CT IMAGING | Facility: HOSPITAL | Age: 59
Discharge: HOME | End: 2024-11-15
Payer: COMMERCIAL

## 2024-11-15 DIAGNOSIS — R10.11 RIGHT UPPER QUADRANT ABDOMINAL PAIN: ICD-10-CM

## 2024-11-15 DIAGNOSIS — R63.4 WEIGHT LOSS, ABNORMAL: ICD-10-CM

## 2024-11-15 DIAGNOSIS — R19.4 CHANGE IN BOWEL HABITS: ICD-10-CM

## 2024-11-15 PROCEDURE — 74177 CT ABD & PELVIS W/CONTRAST: CPT

## 2024-11-15 RX ADMIN — IOHEXOL 100 ML: 350 INJECTION, SOLUTION INTRAVENOUS at 13:42

## 2024-11-20 ENCOUNTER — NURSE TRIAGE (OUTPATIENT)
Age: 59
End: 2024-11-20

## 2024-11-20 NOTE — TELEPHONE ENCOUNTER
Diagnosis/Complaint:Renal Cyst/Flank pain    Insurance:[facility]    History cancer:    Previous urologist:no    Outside testing/where:[facility]    If yes what kind:rpic    Records requested:[facility]    Preferred location:[facility]/[place]

## 2024-11-20 NOTE — TELEPHONE ENCOUNTER
Pt called back now asking to be scheduled with , I spoke with [facility] RN via TEAMS who verified she reviewed records and deemed her symptoms not cause of cyst,I scheduled her with [facility] 11/29/24 7:45 no further action required.

## 2024-11-20 NOTE — TELEPHONE ENCOUNTER
Npt calling to schedule asap with  or  for renal cyst/flank pain,nausea,low grade fever,please review records as pt feels this is urgent.

## 2024-11-21 NOTE — TELEPHONE ENCOUNTER
Pt called questioning what will be taking place at her scheduled appointment on 11/29/24, advised she will be consulting with one of our providers who at that point will provide her with her plan of care. Pt stated she was told yesterday by our office that her symptoms do not correlate to a renal cyst and she was hoping to speak with a nurse. Call connected with [facility] for further assistance.

## 2024-11-21 NOTE — TELEPHONE ENCOUNTER
Patient calling as she wanted to speak with clinical about her symptoms:   Reason for Disposition   Questions about, urinary health    Answer Assessment - Initial Assessment Questions  1. When did this pain start?   Ongoing for  a while, unaware of how long  2. Where is your pain? Is your pain on the left, right, or both sides and does it radiate anywhere?  Right flank pain does not radiate    3. Are you able to rate your pain on a scale of 1-10 with 10 being the worst? Would you say it is mild, moderate, or severe?   Mostly during activity-intermittent unsure if it is stabbing or aching.   When she is working as a , the pain is a 5/10.  4. Do you have other symptoms like nausea, vomiting or a fever of 101 or higher?   nausea  5. Do you have any urinary symptoms such as inability to urinate, urgency, frequency, pain or burning with urination?   No, states she always has microscopic hematuria   6. Have you been to ER, urgent care, PCP, or another specialist for this?   Gastro  7. Have you had recent imaging or urine testing within the last week?   Yes in system     Taking antibiotcs: Riflaxin for 2 weeks.    Protocols used: Urology-Flank Pain / Kidney Stones / Hydronephrosis-ADULT-[place]  Patient inquired if it is common to have a cyst, explained I am not sure what statistics are on this but she can discuss any questions when she comes in for her appointment next week. I reviewed emergency room precautions.

## 2024-11-23 ENCOUNTER — HOSPITAL ENCOUNTER (EMERGENCY)
Facility: HOSPITAL | Age: 59
Discharge: HOME | End: 2024-11-24
Attending: EMERGENCY MEDICINE
Payer: COMMERCIAL

## 2024-11-23 ENCOUNTER — NURSE TRIAGE (OUTPATIENT)
Dept: OTHER | Facility: OTHER | Age: 59
End: 2024-11-23

## 2024-11-23 DIAGNOSIS — I48.91 ATRIAL FIBRILLATION WITH RAPID VENTRICULAR RESPONSE (HCC): Primary | ICD-10-CM

## 2024-11-23 PROCEDURE — 85610 PROTHROMBIN TIME: CPT | Performed by: EMERGENCY MEDICINE

## 2024-11-23 PROCEDURE — 36415 COLL VENOUS BLD VENIPUNCTURE: CPT | Performed by: EMERGENCY MEDICINE

## 2024-11-23 PROCEDURE — 80053 COMPREHEN METABOLIC PANEL: CPT | Performed by: EMERGENCY MEDICINE

## 2024-11-23 PROCEDURE — 93005 ELECTROCARDIOGRAM TRACING: CPT

## 2024-11-23 PROCEDURE — 85730 THROMBOPLASTIN TIME PARTIAL: CPT | Performed by: EMERGENCY MEDICINE

## 2024-11-23 PROCEDURE — 96361 HYDRATE IV INFUSION ADD-ON: CPT

## 2024-11-23 PROCEDURE — 84443 ASSAY THYROID STIM HORMONE: CPT | Performed by: EMERGENCY MEDICINE

## 2024-11-23 PROCEDURE — 99285 EMERGENCY DEPT VISIT HI MDM: CPT

## 2024-11-23 PROCEDURE — 85025 COMPLETE CBC W/AUTO DIFF WBC: CPT | Performed by: EMERGENCY MEDICINE

## 2024-11-23 PROCEDURE — 83735 ASSAY OF MAGNESIUM: CPT | Performed by: EMERGENCY MEDICINE

## 2024-11-23 RX ADMIN — SODIUM CHLORIDE 1000 ML: 0.9 INJECTION, SOLUTION INTRAVENOUS at 23:51

## 2024-11-24 ENCOUNTER — PATIENT MESSAGE (OUTPATIENT)
Dept: CARDIOLOGY CLINIC | Facility: CLINIC | Age: 59
End: 2024-11-24

## 2024-11-24 VITALS
SYSTOLIC BLOOD PRESSURE: 129 MMHG | DIASTOLIC BLOOD PRESSURE: 87 MMHG | HEART RATE: 87 BPM | TEMPERATURE: 98.2 F | BODY MASS INDEX: 21.6 KG/M2 | OXYGEN SATURATION: 98 % | WEIGHT: 121.91 LBS | RESPIRATION RATE: 17 BRPM

## 2024-11-24 DIAGNOSIS — I48.0 PAROXYSMAL ATRIAL FIBRILLATION (HCC): Primary | ICD-10-CM

## 2024-11-24 LAB
ALBUMIN SERPL BCG-MCNC: 4 G/DL (ref 3.5–5)
ALP SERPL-CCNC: 52 U/L (ref 34–104)
ALT SERPL W P-5'-P-CCNC: 30 U/L (ref 7–52)
ANION GAP SERPL CALCULATED.3IONS-SCNC: 5 MMOL/L (ref 4–13)
APTT PPP: 38 SECONDS (ref 23–34)
AST SERPL W P-5'-P-CCNC: 32 U/L (ref 13–39)
ATRIAL RATE: 150 BPM
BASOPHILS # BLD AUTO: 0.1 THOUSANDS/ΜL (ref 0–0.1)
BASOPHILS NFR BLD AUTO: 1 % (ref 0–1)
BILIRUB SERPL-MCNC: 0.49 MG/DL (ref 0.2–1)
BUN SERPL-MCNC: 10 MG/DL (ref 5–25)
CALCIUM SERPL-MCNC: 8.8 MG/DL (ref 8.4–10.2)
CHLORIDE SERPL-SCNC: 111 MMOL/L (ref 96–108)
CO2 SERPL-SCNC: 26 MMOL/L (ref 21–32)
CREAT SERPL-MCNC: 0.55 MG/DL (ref 0.6–1.3)
EOSINOPHIL # BLD AUTO: 0.26 THOUSAND/ΜL (ref 0–0.61)
EOSINOPHIL NFR BLD AUTO: 2 % (ref 0–6)
ERYTHROCYTE [DISTWIDTH] IN BLOOD BY AUTOMATED COUNT: 11.9 % (ref 11.6–15.1)
GFR SERPL CREATININE-BSD FRML MDRD: 102 ML/MIN/1.73SQ M
GLUCOSE SERPL-MCNC: 104 MG/DL (ref 65–140)
HCT VFR BLD AUTO: 48 % (ref 34.8–46.1)
HGB BLD-MCNC: 16.1 G/DL (ref 11.5–15.4)
IMM GRANULOCYTES # BLD AUTO: 0.02 THOUSAND/UL (ref 0–0.2)
IMM GRANULOCYTES NFR BLD AUTO: 0 % (ref 0–2)
INR PPP: 0.99 (ref 0.85–1.19)
LYMPHOCYTES # BLD AUTO: 3.31 THOUSANDS/ΜL (ref 0.6–4.47)
LYMPHOCYTES NFR BLD AUTO: 30 % (ref 14–44)
MAGNESIUM SERPL-MCNC: 2.2 MG/DL (ref 1.9–2.7)
MCH RBC QN AUTO: 30.8 PG (ref 26.8–34.3)
MCHC RBC AUTO-ENTMCNC: 33.5 G/DL (ref 31.4–37.4)
MCV RBC AUTO: 92 FL (ref 82–98)
MONOCYTES # BLD AUTO: 1.09 THOUSAND/ΜL (ref 0.17–1.22)
MONOCYTES NFR BLD AUTO: 10 % (ref 4–12)
NEUTROPHILS # BLD AUTO: 6.2 THOUSANDS/ΜL (ref 1.85–7.62)
NEUTS SEG NFR BLD AUTO: 57 % (ref 43–75)
NRBC BLD AUTO-RTO: 0 /100 WBCS
PLATELET # BLD AUTO: 450 THOUSANDS/UL (ref 149–390)
PMV BLD AUTO: 10.3 FL (ref 8.9–12.7)
POTASSIUM SERPL-SCNC: 3.6 MMOL/L (ref 3.5–5.3)
PROT SERPL-MCNC: 6.3 G/DL (ref 6.4–8.4)
PROTHROMBIN TIME: 13.3 SECONDS (ref 12.3–15)
QRS AXIS: 17 DEGREES
QRSD INTERVAL: 84 MS
QT INTERVAL: 294 MS
QTC INTERVAL: 476 MS
RBC # BLD AUTO: 5.23 MILLION/UL (ref 3.81–5.12)
SODIUM SERPL-SCNC: 142 MMOL/L (ref 135–147)
T WAVE AXIS: 43 DEGREES
TSH SERPL DL<=0.05 MIU/L-ACNC: 1.98 UIU/ML (ref 0.45–4.5)
VENTRICULAR RATE: 158 BPM
WBC # BLD AUTO: 10.98 THOUSAND/UL (ref 4.31–10.16)

## 2024-11-24 PROCEDURE — 99152 MOD SED SAME PHYS/QHP 5/>YRS: CPT | Performed by: EMERGENCY MEDICINE

## 2024-11-24 PROCEDURE — 93010 ELECTROCARDIOGRAM REPORT: CPT | Performed by: INTERNAL MEDICINE

## 2024-11-24 PROCEDURE — 99291 CRITICAL CARE FIRST HOUR: CPT | Performed by: EMERGENCY MEDICINE

## 2024-11-24 PROCEDURE — 92960 CARDIOVERSION ELECTRIC EXT: CPT | Performed by: EMERGENCY MEDICINE

## 2024-11-24 PROCEDURE — 96374 THER/PROPH/DIAG INJ IV PUSH: CPT

## 2024-11-24 PROCEDURE — 92960 CARDIOVERSION ELECTRIC EXT: CPT

## 2024-11-24 RX ORDER — POTASSIUM CHLORIDE 1500 MG/1
40 TABLET, EXTENDED RELEASE ORAL ONCE
Status: COMPLETED | OUTPATIENT
Start: 2024-11-24 | End: 2024-11-24

## 2024-11-24 RX ORDER — KETAMINE HCL IN NACL, ISO-OSM 100MG/10ML
55 SYRINGE (ML) INJECTION ONCE
Status: COMPLETED | OUTPATIENT
Start: 2024-11-24 | End: 2024-11-24

## 2024-11-24 RX ORDER — FENTANYL CITRATE 50 UG/ML
25 INJECTION, SOLUTION INTRAMUSCULAR; INTRAVENOUS ONCE
Refills: 0 | Status: COMPLETED | OUTPATIENT
Start: 2024-11-24 | End: 2024-11-24

## 2024-11-24 RX ADMIN — FENTANYL CITRATE 25 MCG: 50 INJECTION INTRAMUSCULAR; INTRAVENOUS at 00:13

## 2024-11-24 RX ADMIN — POTASSIUM CHLORIDE 40 MEQ: 1500 TABLET, EXTENDED RELEASE ORAL at 01:34

## 2024-11-24 RX ADMIN — Medication 55 MG: at 00:13

## 2024-11-24 NOTE — ED PROVIDER NOTES
Time reflects when diagnosis was documented in both MDM as applicable and the Disposition within this note       Time User Action Codes Description Comment    11/24/2024 12:51 AM Anthony Stanton Add [I48.91] Atrial fibrillation with rapid ventricular response (HCC)           ED Disposition       ED Disposition   Discharge    Condition   Stable    Date/Time   Sun Nov 24, 2024 12:58 AM    Comment   Estella Laird discharge to home/self care.                   Assessment & Plan       Medical Decision Making  59-year-old female with a history of paroxysmal atrial fibrillation presented after developing sudden rapid palpitations this evening around 8 PM.  She took an oral Cardizem at home without improvement.  States the last time she had similar palpitations she was admitted and initiated on a Cardizem infusion earlier this year.  She reports complications including hypotension.  They had planned a MARIAH with cardioversion but patient had a spontaneous conversion to sinus rhythm in the hospital prior to this.  She is not taking any AV fidencio blockers due to poor tolerance due to hypotension.  She is also not anticoagulated.  No aspirin.  Only currently takes daily valsartan.    On arrival she is a heart rate in the 160s, narrow complex irregular consistent with rapid atrial fibrillation.  She had similar reading on her Kardia monitor at home prior to arrival.  Denies any episodes of similar palpitations since her previous admission.  Suspect that she has been in a normal sinus rhythm since that time.    We had an extensive discussion regarding the risks and benefits of rate control versus synchronized electrical cardioversion.  Ultimately joint decision was made to go with electrical cardioversion due to risks of hypotension that she has previously experienced on multiple rate control medications.    Patient was sedated with ketamine, pain control with fentanyl and was cardioverted with 100 J synchronized with conversion  to a sinus tachycardia improving to normal sinus rhythm.  Will not be discharging on any additional medications.  Recommending prompt follow-up with her cardiologist.  Her lab work was unremarkable.  Potassium low normal at 3.6.  Gave 40 mEq oral potassium prior to discharge.    Amount and/or Complexity of Data Reviewed  Labs: ordered.    Risk  Prescription drug management.        ED Course as of 11/24/24 0109   Sun Nov 24, 2024   0040 Successful moderate sedation and synchronized cardioversion to a sinus tachycardia.  Continuing to monitor patient coming out of sedation.  Some infrequent PVCs/PACs.   0101 Patient now awake and alert.  Remains in a normal sinus rhythm.  Feels well overall other than some sedative effects of the ketamine.       Medications   potassium chloride (Klor-Con M20) CR tablet 40 mEq (has no administration in time range)   sodium chloride 0.9 % bolus 1,000 mL (0 mL Intravenous Stopped 11/24/24 0102)   Ketamine HCl 55 mg (55 mg Intravenous Given by Other 11/24/24 0013)   fentaNYL injection 25 mcg (25 mcg Intravenous Given 11/24/24 0013)       ED Risk Strat Scores                           SBIRT 20yo+      Flowsheet Row Most Recent Value   Initial Alcohol Screen: US AUDIT-C     1. How often do you have a drink containing alcohol? 0 Filed at: 11/23/2024 2329   2. How many drinks containing alcohol do you have on a typical day you are drinking?  0 Filed at: 11/23/2024 2329   3a. Male UNDER 65: How often do you have five or more drinks on one occasion? 0 Filed at: 11/23/2024 2323   3b. FEMALE Any Age, or MALE 65+: How often do you have 4 or more drinks on one occassion? 0 Filed at: 11/23/2024 2329   Audit-C Score 0 Filed at: 11/23/2024 2329   KATE: How many times in the past year have you...    Used an illegal drug or used a prescription medication for non-medical reasons? Never Filed at: 11/23/2024 2329                            History of Present Illness       Chief Complaint   Patient presents  with    Palpitations     Patient states she was shopping at felt her heart racing she went home and her cardiomobile told her she was in A-fib.       Past Medical History:   Diagnosis Date    A-fib ([facility]) 2024    Anxiety     Breast lump in female     left-  lumpectomy today 10/31/2023    Hypertension     SVT (supraventricular tachycardia) ([facility]) 2024      Past Surgical History:   Procedure Laterality Date    AUGMENTATION MAMMAPLASTY Bilateral 2000    saline    BREAST BIOPSY Left 2023    BREAST LUMPECTOMY Left 10/31/2023    Procedure: LUMPECTOMY BREAST PELON  LOCALIZED;  Surgeon: Magalie Hubbard MD;  Location: [facility];  Service: Surgical Oncology    COLONOSCOPY      EGD  2021    Intrinsic moderate stenosis dilated with 54 Bengali Littlejohn, erosive gastritis-biopsy negative for H. pylori by Dr. Michele    EGD  2023    [facility]- ANGELA Michele MD.- Esophagus dilated. Bx: Chronic esophagitis and reactive squamous change; no glandular epithelium or esoinophilia;chronic esophagitis with eosinophilia.    HYSTERECTOMY      age 51    US BREAST NEEDLE LOC LEFT Left 10/27/2023    US GUIDED BREAST BIOPSY LEFT COMPLETE Left 2023      Family History   Problem Relation Age of Onset    Breast cancer Mother 63        again at 71 years old.    No Known Problems Father     No Known Problems Daughter     No Known Problems Maternal Grandmother     No Known Problems Maternal Grandfather     No Known Problems Paternal Grandmother     No Known Problems Paternal Grandfather     Breast cancer Maternal Aunt 65    Pancreatic cancer Maternal Uncle         80's      Social History     Tobacco Use    Smoking status: Former     Current packs/day: 0.00     Average packs/day: 0.3 packs/day for 20.0 years (5.0 ttl pk-yrs)     Types: Cigarettes     Start date: 2003     Quit date: 2023     Years since quittin.1    Smokeless tobacco: Never    Tobacco comments:     1 cig a day 10/2023 Quit   Vaping Use    Vaping status:  Never Used   Substance Use Topics    Alcohol use: Yes     Alcohol/week: 7.0 standard drinks of alcohol     Types: 7 Glasses of wine per week     Comment: DAILY  1 GLASS WINE    Drug use: Never      E-Cigarette/Vaping    E-Cigarette Use Never User       E-Cigarette/Vaping Substances      I have reviewed and agree with the history as documented.       History provided by:  Patient and spouse   used: No      59-year-old female with a history of paroxysmal atrial fibrillation presented after developing sudden rapid palpitations this evening around 8 PM.  She took an oral Cardizem at home without improvement.  States the last time she had similar palpitations she was admitted and initiated on a Cardizem infusion earlier this year.  She reports complications including hypotension.  They had planned a MARIAH with cardioversion but patient had a spontaneous conversion to sinus rhythm in the hospital prior to this.  She is not taking any AV fidencio blockers due to poor tolerance due to hypotension.  She is also not anticoagulated.  No aspirin.  Only currently takes daily valsartan.    On arrival she is a heart rate in the 160s, narrow complex irregular consistent with rapid atrial fibrillation.  She had similar reading on her Kardia monitor at home prior to arrival.  Denies any episodes of similar palpitations since her previous admission.  Suspect that she has been in a normal sinus rhythm since that time.    Review of Systems   Constitutional:  Positive for fatigue. Negative for activity change, appetite change and diaphoresis.   Respiratory:  Negative for chest tightness and shortness of breath.    Cardiovascular:  Positive for palpitations. Negative for chest pain.   Gastrointestinal:  Negative for abdominal pain, nausea and vomiting.   Musculoskeletal:  Negative for back pain and neck pain.   Skin:  Negative for color change.   Neurological:  Negative for dizziness, weakness, light-headedness and  headaches.   All other systems reviewed and are negative.          Objective       ED Triage Vitals   Temperature Pulse Blood Pressure Respirations SpO2 Patient Position - Orthostatic VS   11/23/24 2331 11/23/24 2322 11/23/24 2322 11/23/24 2322 11/23/24 2322 11/23/24 2322   98.2 °F (36.8 °C) 76 110/62 18 99 % Sitting      Temp Source Heart Rate Source BP Location FiO2 (%) Pain Score    11/23/24 2331 11/23/24 2322 11/23/24 2322 -- 11/24/24 0004    Oral Monitor Right arm  No Pain      Vitals      Date and Time Temp Pulse SpO2 Resp BP Pain Score FACES Pain Rating User   11/24/24 0106 -- 92 98 % 18 146/95 -- -- DS   11/24/24 0103 -- 95 98 % 18 148/98 -- -- DS   11/24/24 0051 -- 93 97 % 13 133/86 -- -- DS   11/24/24 0048 -- 97 97 % 14 151/87 -- -- DS   11/24/24 0045 -- 101 97 % 15 151/91 -- -- DS   11/24/24 0042 -- 101 97 % 15 146/87 -- -- DS   11/24/24 0039 -- 110 99 % 14 160/99 -- -- DS   11/24/24 0036 -- 120 99 % 19 166/104 -- -- DS   11/24/24 0033 -- 114 98 % 17 177/110 -- -- DS   11/24/24 0030 -- 117 99 % 18 187/120 -- -- DS   11/24/24 0027 -- 124 96 % 18 -- -- -- DS   11/24/24 0015 -- 164 99 % 24 103/81 -- -- DS   11/24/24 0013 -- -- -- -- -- Med Not Given for Pain - for MAR use only -- DS   11/24/24 0004 -- 154 99 % 18  97/69 No Pain -- DS   11/24/24 0000 -- 163 98 % 17 112/68 -- -- DS   11/23/24 2331 98.2 °F (36.8 °C) -- -- -- -- -- -- DS   11/23/24 2324 -- 163 -- -- -- -- -- [facility]   11/23/24 2322 -- 76 99 % 18 110/62 -- -- [facility]            Physical Exam  Vitals and nursing note reviewed.   Constitutional:       Appearance: Normal appearance.      Comments: Appears uncomfortable but not in acute distress.   HENT:      Head: Normocephalic and atraumatic.      Mouth/Throat:      Mouth: Mucous membranes are moist.      Pharynx: Oropharynx is clear.   Eyes:      Conjunctiva/sclera: Conjunctivae normal.   Cardiovascular:      Rate and Rhythm: Tachycardia present. Rhythm irregular.      Pulses: Normal pulses.      Heart  sounds: No murmur heard.  Pulmonary:      Effort: Pulmonary effort is normal.      Breath sounds: Normal breath sounds.   Abdominal:      General: There is no distension.      Palpations: Abdomen is soft.   Musculoskeletal:         General: No swelling or deformity. Normal range of motion.      Cervical back: Normal range of motion and neck supple.   Skin:     General: Skin is warm and dry.      Capillary Refill: Capillary refill takes less than 2 seconds.   Neurological:      General: No focal deficit present.      Mental Status: She is alert and oriented to person, place, and time.   Psychiatric:         Mood and Affect: Mood normal.         Behavior: Behavior normal.         Results Reviewed       Procedure Component Value Units Date/Time    TSH, 3rd generation with Free T4 reflex [[id number]]  (Normal) Collected: 11/23/24 2350    Lab Status: Final result Specimen: Blood from Arm, Left Updated: 11/24/24 0031     TSH 3RD GENERATON 1.981 uIU/mL     Protime-INR [[id number]]  (Normal) Collected: 11/23/24 2350    Lab Status: Final result Specimen: Blood from Arm, Left Updated: 11/24/24 0021     Protime 13.3 seconds      INR 0.99    Narrative:      INR Therapeutic Range    Indication                                             INR Range      Atrial Fibrillation                                               2.0-3.0  Hypercoagulable State                                    2.0.2.3  Left Ventricular Asist Device                            2.0-3.0  Mechanical Heart Valve                                  -    Aortic(with afib, MI, embolism, HF, LA enlargement,    and/or coagulopathy)                                     2.0-3.0 (2.5-3.5)     Mitral                                                             2.5-3.5  Prosthetic/Bioprosthetic Heart Valve               2.0-3.0  Venous thromboembolism (VTE: VT, PE        2.0-3.0    APTT [[id number]]  (Abnormal) Collected: 11/23/24 2350    Lab Status: Final result Specimen: Blood  from Arm, Left Updated: 11/24/24 0021     PTT 38 seconds     CBC and differential [[id number]]  (Abnormal) Collected: 11/23/24 2350    Lab Status: Final result Specimen: Blood from Arm, Left Updated: 11/24/24 0021     WBC 10.98 Thousand/uL      RBC 5.23 Million/uL      Hemoglobin 16.1 g/dL      Hematocrit 48.0 %      MCV 92 fL      MCH 30.8 pg      MCHC 33.5 g/dL      RDW 11.9 %      MPV 10.3 fL      Platelets 450 Thousands/uL      nRBC 0 /100 WBCs      Segmented % 57 %      Immature Grans % 0 %      Lymphocytes % 30 %      Monocytes % 10 %      Eosinophils Relative 2 %      Basophils Relative 1 %      Absolute Neutrophils 6.20 Thousands/µL      Absolute Immature Grans 0.02 Thousand/uL      Absolute Lymphocytes 3.31 Thousands/µL      Absolute Monocytes 1.09 Thousand/µL      Eosinophils Absolute 0.26 Thousand/µL      Basophils Absolute 0.10 Thousands/µL     Comprehensive metabolic panel [[id number]]  (Abnormal) Collected: 11/23/24 2350    Lab Status: Final result Specimen: Blood from Arm, Left Updated: 11/24/24 0015     Sodium 142 mmol/L      Potassium 3.6 mmol/L      Chloride 111 mmol/L      CO2 26 mmol/L      ANION GAP 5 mmol/L      BUN 10 mg/dL      Creatinine 0.55 mg/dL      Glucose 104 mg/dL      Calcium 8.8 mg/dL      AST 32 U/L      ALT 30 U/L      Alkaline Phosphatase 52 U/L      Total Protein 6.3 g/dL      Albumin 4.0 g/dL      Total Bilirubin 0.49 mg/dL      eGFR 102 ml/min/1.73sq m     Narrative:      National Kid[employer] guidelines for Chronic Kidney Disease (CKD):     Stage 1 with normal or high GFR (GFR > 90 mL/min/1.73 square meters)    Stage 2 Mild CKD (GFR = 60-89 mL/min/1.73 square meters)    Stage 3A Moderate CKD (GFR = 45-59 mL/min/1.73 square meters)    Stage 3B Moderate CKD (GFR = 30-44 mL/min/1.73 square meters)    Stage 4 Severe CKD (GFR = 15-29 mL/min/1.73 square meters)    Stage 5 End Stage CKD (GFR <15 mL/min/1.73 square meters)  Note: GFR calculation is accurate only with a  steady state creatinine    Magnesium [[id number]]  (Normal) Collected: 11/23/24 2350    Lab Status: Final result Specimen: Blood from Arm, Left Updated: 11/24/24 0015     Magnesium 2.2 mg/dL             No orders to display       Pre-Procedural Sedation    Performed by: Anthony Stanton MD  Authorized by: Anthony Stanton MD    Consent:     Consent obtained:  Verbal    Consent given by:  Patient and spouse  Universal protocol:     Patient identity confirmation method:  Verbally with patient  Indications:     Sedation purpose:  Cardioversion    Procedure necessitating sedation performed by:  Physician performing sedation    Intended level of sedation:  Moderate (conscious sedation)  Pre-sedation assessment:     Time since last food or drink:  4 hours    NPO status caution: urgency dictates proceeding with non-ideal NPO status      ASA classification: class 2 - patient with mild systemic disease      Neck mobility: normal      Mallampati score:  II - soft palate, uvula, fauces visible    Pre-sedation assessments completed and reviewed: airway patency, cardiovascular function, hydration status, mental status, nausea/vomiting, pain level, respiratory function and temperature      History of difficult intubation: no    Procedural Sedation    Date/Time: 11/24/2024 12:08 AM    Performed by: Anthony Stanton MD  Authorized by: Anthony Stanton MD    Immediate pre-procedure details:     Reassessment: Patient reassessed immediately prior to procedure      Reviewed: vital signs, relevant labs/tests and NPO status      Verified: bag valve mask available, emergency equipment available, intubation equipment available, IV patency confirmed and oxygen available    Procedure details (see MAR for exact dosages):     Preoxygenation:  Room air    Sedation:  Ketamine    Analgesia:  Fentanyl    Intra-procedure monitoring:  Blood pressure monitoring, continuous capnometry, frequent LOC assessments, frequent vital sign checks,  continuous pulse oximetry and cardiac monitor    Intra-procedure events: respiratory depression      Intra-procedure management:  Supplemental oxygen    Total sedation time (minutes):  20  Post-procedure details:     Attendance: Constant attendance by certified staff until patient recovered      Recovery: Patient returned to pre-procedure baseline      Post-sedation assessments completed and reviewed: airway patency, cardiovascular function, hydration status, mental status, nausea/vomiting, pain level, respiratory function and temperature      Patient is stable for discharge or admission: yes      Patient tolerance:  Tolerated well, no immediate complications  Cardioversion    Date/Time: 11/24/2024 12:15 AM    Performed by: Anthony Stanton MD  Authorized by: Anthony Stanton MD    Verbal consent obtained?: Yes    Consent given by:  Patient  Patient identity confirmed:  Verbally with patient  Patient sedated: Yes    Sedation type: moderate (conscious) sedation    Sedation:  Ketamine  Analgesia:  Fentanyl  Vital signs: Vital signs monitored during sedation    Cardioversion basis:  Emergent  Pre-procedure rhythm:  Atrial fibrillation  Position: Patient was placed in a supine position    Chest area exposed: Chest area was exposed    Electrodes:  Pads  Electrodes placed:  Anterior-lateral  Number of attempts:  1  Attempt 1:     Attempt 1 mode:  Synchronous    Attempt 1 waveform:  Biphasic    Attempt 1 shock (Joules):  100    Attempt 1 outcome:  Conversion to normal sinus rhythm (Sinus tachycardia)  Post-procedure rhythm:  Normal sinus rhythm  Complications: no complications    Patient tolerance:  Patient tolerated the procedure well with no immediate complications  CriticalCare Time    Date/Time: 11/24/2024 12:52 AM    Performed by: Anthony Stanton MD  Authorized by: Anthony Stanton MD    Critical care provider statement:     Critical care time (minutes):  45    Critical care time was exclusive of:  Separately  billable procedures and treating other patients and teaching time    Critical care was necessary to treat or prevent imminent or life-threatening deterioration of the following conditions:  Cardiac failure (Rapid atrial fibrillation)    Critical care was time spent personally by me on the following activities:  Obtaining history from patient or surrogate, development of treatment plan with patient or surrogate, evaluation of patient's response to treatment, examination of patient, ordering and review of laboratory studies, ordering and performing treatments and interventions, re-evaluation of patient's condition and review of old charts      ED Medication and Procedure Management   Prior to Admission Medications   Prescriptions Last Dose Informant Patient Reported? Taking?   ALPRAZolam (XANAX) 0.25 mg tablet  Self Yes No   Sig: Take 0.25 mg by mouth Three times daily as needed   Miconazole-Zinc Oxide-Petrolat 0.25-15-81.35 % OINT  Self Yes No   Sig: if needed (for rash on lips)   clobetasol (TEMOVATE) 0.05 % ointment  Self Yes No   Sig: if needed   clonazePAM (KlonoPIN) 0.5 mg tablet  Self Yes No   Sig: Take 0.5 mg by mouth daily at bedtime   desonide (DESOWEN) 0.05 % cream  Self Yes No   Sig: Apply topically if needed for irritation   famotidine (PEPCID) 40 MG tablet  Self No No   Sig: Take 1 tablet (40 mg total) by mouth 2 (two) times a day   Patient taking differently: Take 40 mg by mouth if needed   methylPREDNISolone acetate (DEPO-MEDROL) 40 mg/mL injection  Self Yes No   Sig: administered   rifaximin (XIFAXAN) 550 mg tablet   No No   Sig: Take 1 tablet (550 mg total) by mouth every 8 (eight) hours for 14 days   tacrolimus (PROTOPIC) 0.1 % ointment  Self Yes No   Sig: APPLY TO AFFECTED AREA S) TWO TIMES A DAY AS NEEDED   valACYclovir (VALTREX) 1,000 mg tablet  Self Yes No   Sig: if needed   valsartan (DIOVAN) 40 mg tablet   Yes No   Si mg daily      Facility-Administered Medications: None     Patient's  Medications   Discharge Prescriptions    No medications on file       ED SEPSIS DOCUMENTATION   Time reflects when diagnosis was documented in both MDM as applicable and the Disposition within this note       Time User Action Codes Description Comment    11/24/2024 12:51 AM Anthony Stanton Add [I48.91] Atrial fibrillation with rapid ventricular response (HCC)                  Anthony Stanton MD  11/24/24 0109

## 2024-11-24 NOTE — TELEPHONE ENCOUNTER
"Reason for Disposition   [1] Heart beating very rapidly (e.g., > 140 / minute) AND [2] present now  (Exception: During exercise.)    Answer Assessment - Initial Assessment Questions  1. DESCRIPTION: \"Please describe your heart rate or heartbeat that you are having\" (e.g., fast/slow, regular/irregular, skipped or extra beats, \"palpitations\")      HR at 171, she states she feels her heard racing   2. ONSET: \"When did it start?\" (e.g., minutes, hours, days)       3 hours ago   3. DURATION: \"How long does it last\" (e.g., seconds, minutes, hours)      Constant for 3 hours   4. PATTERN \"Does it come and go, or has it been constant since it started?\"  \"Does it get worse with exertion?\"   \"Are you feeling it now?\"      Constant, patient feels light headed   5. TAP: \"Using your hand, can you tap out what you are feeling on a chair or table in front of you, so that I can hear?\" Note: Not all patients can do this.        *No Answer*  6. HEART RATE: \"Can you tell me your heart rate?\" \"How many beats in 15 seconds?\"  Note: Not all patients can do this.        171  7. RECURRENT SYMPTOM: \"Have you ever had this before?\" If Yes, ask: \"When was the last time?\" and \"What happened that time?\"       Yes in January   8. CAUSE: \"What do you think is causing the palpitations?\"      History of AFIB   9. CARDIAC HISTORY: \"Do you have any history of heart disease?\" (e.g., heart attack, angina, bypass surgery, angioplasty, arrhythmia)       No other history   10. OTHER SYMPTOMS: \"Do you have any other symptoms?\" (e.g., dizziness, chest pain, sweating, difficulty breathing)        Light headed, headache, doesn't feel great   11. PREGNANCY: \"Is there any chance you are pregnant?\" \"When was your last menstrual period?\"        NA    Protocols used: Heart Rate and Heartbeat Questions-Adult-AH    "

## 2024-11-25 ENCOUNTER — TELEPHONE (OUTPATIENT)
Age: 59
End: 2024-11-25

## 2024-11-25 ENCOUNTER — DOCUMENTATION (OUTPATIENT)
Dept: SURGICAL ONCOLOGY | Facility: CLINIC | Age: 59
End: 2024-11-25

## 2024-11-25 ENCOUNTER — OFFICE VISIT (OUTPATIENT)
Dept: SURGICAL ONCOLOGY | Facility: CLINIC | Age: 59
End: 2024-11-25
Payer: COMMERCIAL

## 2024-11-25 VITALS
DIASTOLIC BLOOD PRESSURE: 80 MMHG | OXYGEN SATURATION: 98 % | TEMPERATURE: 97.8 F | BODY MASS INDEX: 21.44 KG/M2 | HEIGHT: 63 IN | WEIGHT: 121 LBS | SYSTOLIC BLOOD PRESSURE: 130 MMHG | HEART RATE: 91 BPM

## 2024-11-25 DIAGNOSIS — D36.9 INTRADUCTAL PAPILLOMA: ICD-10-CM

## 2024-11-25 DIAGNOSIS — Z12.31 VISIT FOR SCREENING MAMMOGRAM: Primary | ICD-10-CM

## 2024-11-25 DIAGNOSIS — Z80.3 FAMILY HISTORY OF BREAST CANCER: Primary | ICD-10-CM

## 2024-11-25 DIAGNOSIS — N60.92 ATYPICAL DUCTAL HYPERPLASIA OF LEFT BREAST: ICD-10-CM

## 2024-11-25 PROCEDURE — 99213 OFFICE O/P EST LOW 20 MIN: CPT | Performed by: NURSE PRACTITIONER

## 2024-11-25 RX ORDER — ONDANSETRON 4 MG/1
TABLET, FILM COATED ORAL
COMMUNITY
Start: 2024-10-18 | End: 2024-11-29 | Stop reason: HOSPADM

## 2024-11-25 NOTE — TELEPHONE ENCOUNTER
Patient calling to inform Dr. Capps of her recent hospital visit last night. Please see [employer] message from yesterday for further context. Patient is asking what her next steps are moving forward.

## 2024-11-25 NOTE — PROGRESS NOTES
Surgical Oncology Benign Breast       [address]   [facility] [place]  [address]  [facility] [id number]    Estella Laird  1965  [id number]      Chief Complaint   Patient presents with    Follow-up       Assessment/Plan:  1. Family history of breast cancer (Primary)  - MRI breast bilateral w and wo contrast w cad; Future    2. Intraductal papilloma      3. Atypical ductal hyperplasia of left breast  - 6 mo f/u visit  - BUN; Future  - Creatinine, serum; Future  - MRI breast bilateral w and wo contrast w cad; Future      Discussion/Summary: Patient is a 59-year-old female with a history of atypical ductal hyperplasia and a papilloma of the left breast. She underwent surgical excision with Dr. Hubbard. We are now screening her with annual breast MRI staggered with annual mammography. She had a breast MRI in March 2024 which was BI-RADS 2. She had a bilateral 3d screening mammogram in September of 2024 which was BIRADS 2, category 2 density. She also has a family history of both breast and pancreatic cancer and  underwent genetic testing which was negative.  She offers no new complaints today aside from a recent cardioversion for recurrent A Fib.  There are no worrisome findings on today's clinical exam. We will plan to see her back in 6 months for a follow-up visit or sooner should the need arise. She was instructed to contact us with any changes or concerns in the interim. All questions were answered today.     History of Present Illness:     -Interval History: Patient presents today for follow-up visit.  She has not appreciated any changes on self exam.  She had a bilateral mammogram in September which was benign.  She underwent genetic testing which was negative. he was recently hospitalized for A-fib and underwent a cardioversion.  She reports associated fatigue.      Review of Systems:  Review of Systems   Constitutional:  Positive for fatigue (recent cardioversion).  Negative for chills and fever.   Respiratory:  Negative for cough and shortness of breath.    Cardiovascular:  Negative for chest pain.   Hematological:  Negative for adenopathy.   Psychiatric/Behavioral:  Negative for confusion.        Patient Active Problem List   Diagnosis    Tobacco use disorder    Seasonal allergic rhinitis    Hypertriglyceridemia    Family history of breast cancer    Atopic dermatitis and related condition    Chronic neck pain    Abnormal EKG    Dysphagia    Intraductal papilloma    Atypical ductal hyperplasia of left breast    Family history of pancreatic cancer    Atrial fibrillation (HCC)    Hypertension     Past Medical History:   Diagnosis Date    A-fib (HCC) 01/29/2024    Anxiety     Breast lump in female     left-  lumpectomy today 10/31/2023    Hypertension     SVT (supraventricular tachycardia) (HCC) 01/29/2024     Past Surgical History:   Procedure Laterality Date    AUGMENTATION MAMMAPLASTY Bilateral 2000    saline    BREAST BIOPSY Left 09/26/2023    BREAST LUMPECTOMY Left 10/31/2023    Procedure: LUMPECTOMY BREAST PELON  LOCALIZED;  Surgeon: Magalie Hubbard MD;  Location: [facility];  Service: Surgical Oncology    COLONOSCOPY      EGD  09/30/2021    Intrinsic moderate stenosis dilated with 53 [place], erosive gastritis-biopsy negative for H. pylori by Dr. Michele    EGD  05/04/2023    [facility]- ANGELA Michele MD.- Esophagus dilated. Bx: Chronic esophagitis and reactive squamous change; no glandular epithelium or esoinophilia;chronic esophagitis with eosinophilia.    HYSTERECTOMY      age 51    US BREAST NEEDLE LOC LEFT Left 10/27/2023    US GUIDED BREAST BIOPSY LEFT COMPLETE Left 09/26/2023     Family History   Problem Relation Age of Onset    Breast cancer Mother 63        again at 71 years old.    No Known Problems Father     No Known Problems Daughter     No Known Problems Maternal Grandmother     No Known Problems Maternal Grandfather     No Known Problems Paternal Grandmother     No  Known Problems Paternal Grandfather     Breast cancer Maternal Aunt 65    Pancreatic cancer Maternal Uncle         80's     Social History     Socioeconomic History    Marital status: /Civil Union     Spouse name: Not on file    Number of children: Not on file    Years of education: Not on file    Highest education level: Not on file   Occupational History    Occupation: hairdresser   Tobacco Use    Smoking status: Former     Current packs/day: 0.00     Average packs/day: 0.3 packs/day for 20.0 years (5.0 ttl pk-yrs)     Types: Cigarettes     Start date: 2003     Quit date: 2023     Years since quittin.1    Smokeless tobacco: Never    Tobacco comments:     1 cig a day 10/2023 Quit   Vaping Use    Vaping status: Never Used   Substance and Sexual Activity    Alcohol use: Yes     Alcohol/week: 7.0 standard drinks of alcohol     Types: 7 Glasses of wine per week     Comment: DAILY  1 GLASS WINE    Drug use: Never    Sexual activity: Not on file   Other Topics Concern    Not on file   Social History Narrative    Not on file     Social Drivers of Health     Financial Resource Strain: Not on file   Food Insecurity: Not on file   Transportation Needs: Not on file   Physical Activity: Not on file   Stress: Not on file   Social Connections: Unknown (12/3/2023)    Received from [facility] [employer] [facility], [facility]    Social Connection and Isolation Panel [NHANES]     Frequency of Communication with Friends and Family: Not on file     Frequency of Social Gatherings with Friends and Family: Not on file     Attends Lutheran Services: Not on file     Active Member of Clubs or Organizations: Not on file     Attends Club or Organization Meetings: Not on file     Marital Status:    Intimate Partner Violence: Not At Risk (12/3/2023)    [facility], [facility]    Humiliation, Afraid, Rape, and Kick questionnaire     Fear of Current or  Ex-Partner: No     Emotionally Abused: No     Physically Abused: No     Sexually Abused: No   Housing Stability: Not on file       Current Outpatient Medications:     clonazePAM (KlonoPIN) 0.5 mg tablet, Take 0.5 mg by mouth daily at bedtime, Disp: , Rfl:     desonide (DESOWEN) 0.05 % cream, Apply topically if needed for irritation, Disp: , Rfl:     famotidine (PEPCID) 40 MG tablet, Take 1 tablet (40 mg total) by mouth 2 (two) times a day (Patient taking differently: Take 40 mg by mouth if needed), Disp: 60 tablet, Rfl: 2    methylPREDNISolone acetate (DEPO-MEDROL) 40 mg/mL injection, administered, Disp: , Rfl:     Miconazole-Zinc Oxide-Petrolat 0.25-15-81.35 % OINT, if needed (for rash on lips), Disp: , Rfl:     ondansetron (ZOFRAN) 4 mg tablet, TAKE ONE TABLET BY MOUTH EVERY 8 HOURS AS NEEDED FOR NAUSEA OR VOMITING, Disp: , Rfl:     rifaximin (XIFAXAN) 550 mg tablet, Take 1 tablet (550 mg total) by mouth every 8 (eight) hours for 14 days, Disp: 42 tablet, Rfl: 0    tacrolimus (PROTOPIC) 0.1 % ointment, APPLY TO AFFECTED AREA S) TWO TIMES A DAY AS NEEDED, Disp: , Rfl:     valACYclovir (VALTREX) 1,000 mg tablet, if needed, Disp: , Rfl:     valsartan (DIOVAN) 40 mg tablet, 80 mg daily, Disp: , Rfl:     ALPRAZolam (XANAX) 0.25 mg tablet, Take 0.25 mg by mouth Three times daily as needed, Disp: , Rfl:   Allergies   Allergen Reactions    Celebrex [Celecoxib] Tongue Swelling    Sulfa Antibiotics Hives and Tongue Swelling    Amlodipine Other (See Comments)     Possible  ankle swelling on 5 mg    Lisinopril Other (See Comments)     dizziness    Methocarbamol Other (See Comments)     Nightmares    Other Sneezing     Seasonal allergies    Sulfamethoxazole-Trimethoprim Hives     Vitals:    11/25/24 0837   BP: 130/80   Pulse: 91   Temp: 97.8 °F (36.6 °C)   SpO2: 98%       Physical Exam  Vitals reviewed.   Constitutional:       Appearance: Normal appearance.   HENT:      Head: Normocephalic and atraumatic.   Pulmonary:       Effort: Pulmonary effort is normal.   Chest:   Breasts:     Right: No swelling, bleeding, inverted nipple, mass, nipple discharge, skin change or tenderness.      Left: Skin change (surgical scars) present. No swelling, bleeding, inverted nipple, mass, nipple discharge or tenderness.      Comments: Bilateral implants present  Lymphadenopathy:      Upper Body:      Right upper body: No supraclavicular or axillary adenopathy.      Left upper body: No supraclavicular or axillary adenopathy.   Skin:     General: Skin is warm and dry.   Neurological:      General: No focal deficit present.      Mental Status: She is alert and oriented to person, place, and time.   Psychiatric:         Mood and Affect: Mood normal.         Advance Care Planning/Advance Directives:  Discussed disease status and treatment goals with the patient.

## 2024-11-26 ENCOUNTER — NURSE TRIAGE (OUTPATIENT)
Age: 59
End: 2024-11-26

## 2024-11-26 NOTE — TELEPHONE ENCOUNTER
"Reason for Conversation: Pt calling to report that she her heart rate has been 102-110 the past 2 days and she is very anxious. She was cardioverted due to rapid AFIB on Sat 11/23 in the ER and she is concerned. The patient denies SOB or other symptoms, just anxiety. She does use a [employer] mobile device and it is reporting normal sinus rhythm, no AFIB detected. Pt reports she is unable to tolerate antiarrythmic medications and she is not anticoagulated.     Pt does not have a visit with EP until April.  Advised patient will inform provider of symptoms and call patient back with any recommendations      VS/Weight: (Note: Please include date/time vitals/weight were measured)  -110    Pain: No    Risk Factors: Hypertension and Arrhythmia    Recent relevant testing and date of testing: Echo and Other ECHO 1/30/24, EH 3/15/24      Medication: Valsartan 80mg daily    Upcoming Office Visit: Yes No visit with EP until April     Last Office Visit: 9/16/24         Answer Assessment - Initial Assessment Questions  1. DESCRIPTION: \"Please describe your heart rate or heartbeat that you are having\" (e.g., fast/slow, regular/irregular, skipped or extra beats, \"palpitations\")      Rapid heart rate after cardioversion on Saturday night- pt is anxious with a heart rate of 102-110  2. ONSET: \"When did it start?\" (e.g., minutes, hours, days)       Yesterday   3. DURATION: \"How long does it last\" (e.g., seconds, minutes, hours)      constant  4. PATTERN \"Does it come and go, or has it been constant since it started?\"  \"Does it get worse with exertion?\"   \"Are you feeling it now?\"      Denies - due to anxiety state  5. TAP: \"Using your hand, can you tap out what you are feeling on a chair or table in front of you, so that I can hear?\" Note: Not all patients can do this.        No AFIB detected per cardio mobile device   6. HEART RATE: \"Can you tell me your heart rate?\" \"How many beats in 15 seconds?\"  Note: Not all patients can do " "this.        102-110  7. RECURRENT SYMPTOM: \"Have you ever had this before?\" If Yes, ask: \"When was the last time?\" and \"What happened that time?\"       Yes - cardioverted on Sat 11/23  8. CAUSE: \"What do you think is causing the palpitations?\"      unsure  9. CARDIAC HISTORY: \"Do you have any history of heart disease?\" (e.g., heart attack, angina, bypass surgery, angioplasty, arrhythmia)       AFIB, HTN  10. OTHER SYMPTOMS: \"Do you have any other symptoms?\" (e.g., dizziness, chest pain, sweating, difficulty breathing)        Denies, however anxious    Protocols used: Heart Rate and Heartbeat Questions-Adult-[place]    "

## 2024-11-26 NOTE — TELEPHONE ENCOUNTER
Called patient back.  She has had about 10 pound weight loss from small bacterial overgrowth and is now on rifaximin.  Unclear if this is contributing to her tachycardia currently.  Recommended she increase her fluid intake and add some electrolytes like Powerade or Gatorade to replete what she lost.  Discussed restarting a beta-blocker, but patient is hesitant to do so given her prior side effects.  Instructed her to resume her Eliquis if she notices atrial fibrillation on her [employer] mobile.  She was up to make her sooner appointment with Dr. Guerrero for the being in January 2025.  If she goes back into A-fib and her heart rates uncontrolled, instructed her to go to [facility] for EP evaluation.

## 2024-11-27 ENCOUNTER — CLINICAL SUPPORT (OUTPATIENT)
Dept: CARDIOLOGY CLINIC | Facility: CLINIC | Age: 59
End: 2024-11-27
Payer: COMMERCIAL

## 2024-11-27 DIAGNOSIS — I48.0 PAROXYSMAL ATRIAL FIBRILLATION (HCC): Primary | ICD-10-CM

## 2024-11-27 PROCEDURE — RECHECK

## 2024-11-27 PROCEDURE — 93000 ELECTROCARDIOGRAM COMPLETE: CPT

## 2024-11-27 PROCEDURE — RECHECK: Performed by: STUDENT IN AN ORGANIZED HEALTH CARE EDUCATION/TRAINING PROGRAM

## 2024-11-27 NOTE — PROGRESS NOTES
Patient here today for EKG.  Seen in ER Saturday, 11/23/24.  Was cardioverted .She called yesterday with complaints of heart rate in 102-110 range for past two days. Denies SOB or other symptoms  Per Dr. Capps, requested EKG today

## 2024-11-27 NOTE — TELEPHONE ENCOUNTER
Received call from pt.  She states Dr. Capps called her last night regarding her elevated heart rates and suggested she come into office today for a nurse visit for an EKG.  Deedee transferred pt to [place] in [place] office to assist in scheduling this.

## 2024-11-29 ENCOUNTER — CONSULT (OUTPATIENT)
Age: 59
End: 2024-11-29
Payer: COMMERCIAL

## 2024-11-29 VITALS
SYSTOLIC BLOOD PRESSURE: 116 MMHG | HEIGHT: 63 IN | WEIGHT: 120 LBS | HEART RATE: 78 BPM | BODY MASS INDEX: 21.26 KG/M2 | OXYGEN SATURATION: 98 % | DIASTOLIC BLOOD PRESSURE: 80 MMHG

## 2024-11-29 DIAGNOSIS — N28.1 RENAL CYST, RIGHT: ICD-10-CM

## 2024-11-29 PROCEDURE — 99203 OFFICE O/P NEW LOW 30 MIN: CPT | Performed by: PHYSICIAN ASSISTANT

## 2025-01-03 ENCOUNTER — PREP FOR PROCEDURE (OUTPATIENT)
Dept: CARDIOLOGY CLINIC | Facility: CLINIC | Age: 60
End: 2025-01-03

## 2025-01-03 ENCOUNTER — TELEPHONE (OUTPATIENT)
Dept: CARDIOLOGY CLINIC | Facility: CLINIC | Age: 60
End: 2025-01-03

## 2025-01-03 ENCOUNTER — CONSULT (OUTPATIENT)
Dept: CARDIOLOGY CLINIC | Facility: CLINIC | Age: 60
End: 2025-01-03
Payer: COMMERCIAL

## 2025-01-03 VITALS — BODY MASS INDEX: 21.28 KG/M2 | HEIGHT: 63 IN | WEIGHT: 120.1 LBS

## 2025-01-03 DIAGNOSIS — I48.91 ATRIAL FIBRILLATION WITH RAPID VENTRICULAR RESPONSE (HCC): ICD-10-CM

## 2025-01-03 DIAGNOSIS — I48.91 ATRIAL FIBRILLATION WITH RAPID VENTRICULAR RESPONSE (HCC): Primary | ICD-10-CM

## 2025-01-03 PROCEDURE — 99205 OFFICE O/P NEW HI 60 MIN: CPT | Performed by: INTERNAL MEDICINE

## 2025-01-03 PROCEDURE — 93000 ELECTROCARDIOGRAM COMPLETE: CPT | Performed by: INTERNAL MEDICINE

## 2025-01-03 NOTE — H&P (VIEW-ONLY)
HEART AND VASCULAR  CARDIAC ELECTROPHYSIOLOGY   HEART RHYTHM CENTER  [facility]    Outpatient New Consult    Today's Date: 25        Patient name: Estella Laird  YOB: 1965  Sex: female         Chief Complaint: Referral from Dr Capps      ASSESSMENT:  Problem List Items Addressed This Visit    None  Visit Diagnoses         Atrial fibrillation with rapid ventricular response (HCC)        Relevant Orders    POCT ECG          60 yo female  1) Paroxysmal afib, one DCCV and one converted after meds. Doesn't tolerate rate control. Very symptomatic with RVR and has frequent brief PAT/SVT episodes on Zio also symptomatic. She is incredibly anxious since dx of this.   Did not tolerate ELiquis (vague side effects, sweating, fatigue)  [id number]=7 for female. FMHx of various heart conditions but parents didn't have afib. Son  age 5 pulm atresia. Modest ETOh, no wine in months since afib dx. No symptoms of MONCHO. Thin. She has relatively not major risk factors for afib other than age.  2) Anxiety      PLAN:  Recommend Atrial fibrillation and/or flutter ablation.  Additional arrhythmias may also be targeted. Transesophageal echocardiogram maybe used to rule out thrombus. Risks and benefits discussed with patient and family. Explained risk of rare but serious complications like heart perforation, stroke, heart attack, kidney injury. Energy source for ablation my vary including radiofrequency, pulsed field (PFA), or cryoablation. PFA ablation does not have known esophageal injury risk so general safer from this life threatening complication. Risks of phrenic nerve (diaphragm) and lung injury also is less with this energy source.    Explained ablation for atrial fibrillation is treatment not a cure, about 20% of patient will opt for second ablation. We went over usually recovery and expectations of going through procedure.   3. Need to start anticoagulation 1 month before ablation and 2  months after. Will try xarelto since didn't tolerate Eliquis.    She does not want to wait until March for her ablation, ok to have with one of my partners if sooner available.   Orders Placed This Encounter   Procedures    POCT ECG     There are no discontinued medications.      .............................................................................................    HPI/Subjective:     58 yo female  1) Paroxysmal afib, one DCCV and one converted after meds. Doesn't tolerate rate control. Very symptomatic with RVR and has frequent brief PAT/SVT episodes on Zio also symptomatic. She is incredibly anxious since dx of this.   Did not tolerate ELiquis (vague side effects, sweating, fatigue)  [id number]=1 for female. FMHx of various heart conditions but parents didn't have afib. Son  age 5 pulm atresia. Modest ETOh, no wine in months since afib dx. No symptoms of MONCHO. Thin. She has relatively not major risk factors for afib other than age.  2) Anxiety    Please note HPI is listed by problem with with update following it, it is copied again in the assessment above and reflects medical decision making as well.       Complete 12 point ROS reviewed and otherwise non pertinent or negative except as per HPI pertinent positives in Cardiovascular and Respiratory emphasized. Please see paper chart for outpatient clinic patients where the patient completed the 12 point ROS survey.           Past Medical History:   Diagnosis Date    A-fib ([facility]) 2024    Anxiety     Breast lump in female     left-  lumpectomy today 10/31/2023    Hypertension     SVT (supraventricular tachycardia) ([facility]) 2024       Allergies   Allergen Reactions    Celebrex [Celecoxib] Tongue Swelling    Sulfa Antibiotics Hives and Tongue Swelling    Amlodipine Other (See Comments)     Possible  ankle swelling on 5 mg    Lisinopril Other (See Comments)     dizziness    Methocarbamol Other (See Comments)     Nightmares    Other Sneezing      Seasonal allergies    Sulfamethoxazole-Trimethoprim Hives     I reviewed the Home Medication list and Allergies in the chart.   Scheduled Meds:  Current Outpatient Medications   Medication Sig Dispense Refill    ALPRAZolam (XANAX) 0.25 mg tablet Take 0.25 mg by mouth Three times daily as needed      clonazePAM (KlonoPIN) 0.5 mg tablet Take 0.5 mg by mouth daily at bedtime      desonide (DESOWEN) 0.05 % cream Apply topically if needed for irritation      famotidine (PEPCID) 40 MG tablet Take 1 tablet (40 mg total) by mouth 2 (two) times a day 60 tablet 2    methylPREDNISolone acetate (DEPO-MEDROL) 40 mg/mL injection administered      valACYclovir (VALTREX) 1,000 mg tablet if needed      valsartan (DIOVAN) 40 mg tablet 80 mg daily      Miconazole-Zinc Oxide-Petrolat 0.25-15-81.35 % OINT if needed (for rash on lips)      tacrolimus (PROTOPIC) 0.1 % ointment APPLY TO AFFECTED AREA S) TWO TIMES A DAY AS NEEDED       No current facility-administered medications for this visit.     PRN Meds:.        Family History   Problem Relation Age of Onset    Breast cancer Mother 63        again at 71 years old.    No Known Problems Father     No Known Problems Daughter     No Known Problems Maternal Grandmother     No Known Problems Maternal Grandfather     No Known Problems Paternal Grandmother     No Known Problems Paternal Grandfather     Breast cancer Maternal Aunt 65    Pancreatic cancer Maternal Uncle         80's       Social History     Socioeconomic History    Marital status: /Civil Union     Spouse name: Not on file    Number of children: Not on file    Years of education: Not on file    Highest education level: Not on file   Occupational History    Occupation: [employer]   Tobacco Use    Smoking status: Former     Current packs/day: 0.00     Average packs/day: 0.3 packs/day for 20.0 years (5.0 ttl pk-yrs)     Types: Cigarettes     Start date: 2003     Quit date: 2023     Years since quittin.2     "Smokeless tobacco: Never    Tobacco comments:     1 cig a day 10/2023 Quit   Vaping Use    Vaping status: Never Used   Substance and Sexual Activity    Alcohol use: Yes     Alcohol/week: 7.0 standard drinks of alcohol     Types: 7 Glasses of wine per week     Comment: DAILY  1 GLASS WINE    Drug use: Never    Sexual activity: Not on file   Other Topics Concern    Not on file   Social History Narrative    Not on file     Social Drivers of Health     Financial Resource Strain: Not on file   Food Insecurity: Not on file   Transportation Needs: Not on file   Physical Activity: Not on file   Stress: Not on file   Social Connections: Unknown (12/3/2023)    [facility]    Social Connection and Isolation Panel [NHANES]     Frequency of Communication with Friends and Family: Not on file     Frequency of Social Gatherings with Friends and Family: Not on file     Attends Taoist Services: Not on file     Active Member of Clubs or Organizations: Not on file     Attends Club or Organization Meetings: Not on file     Marital Status:    Intimate Partner Violence: Not At Risk (12/3/2023)    Received from [facility], [facility]    Humiliation, Afraid, Rape, and Kick questionnaire     Fear of Current or Ex-Partner: No     Emotionally Abused: No     Physically Abused: No     Sexually Abused: No   Housing Stability: Not on file         OBJECTIVE:    Ht 5' 3\" (1.6 m)   Wt 54.5 kg (120 lb 1.6 oz)   BMI 21.27 kg/m²   Vitals:    01/03/25 1452   Weight: 54.5 kg (120 lb 1.6 oz)     GEN: No acute distress, Alert and oriented, well appearing  HEENT:External ears normal, oral pharynx clear, mucous membranes moist  EYES: Pupils equal, sclera anicteric, midline, normal conjuctiva  NECK: No JVD, supple, no obvious masses or thryomegaly or goiter  CARDIOVASCULAR:  RRR, No murmur, rub, gallops S1,S2  LUNGS: Clear To auscultation bilaterally, " "normal effort, no rales, rhonchi, crackles   ABDOMEN:  nondistended,  without obvious organomegaly or ascites  EXTREMITIES/VASCULAR:  No edema. warm an well perfused.  PSYCH: Normal Affect,  linear speech pattern without evidence of psychosis.   NEURO: Grossly intact, moving all extremiteis equal, face symmetric, alert and responsive, no obvious focal defecits   GAIT:  Ambulates normally without difficulty  HEME: No bleeding, bruising, petechia, purpura   SKIN: No significant rashes on visibile skin, warm, no diaphoresis or pallor.     Lab Results:       LABS:      Chemistry        Component Value Date/Time    K 3.6 11/23/2024 2350    K 4.2 10/07/2024 0922     (H) 11/23/2024 2350     10/07/2024 0922    CO2 26 11/23/2024 2350    CO2 27 10/07/2024 0922    BUN 10 11/23/2024 2350    BUN 8 10/07/2024 0922    CREATININE 0.55 (L) 11/23/2024 2350    CREATININE 0.70 10/07/2024 0922        Component Value Date/Time    CALCIUM 8.8 11/23/2024 2350    CALCIUM 9.5 10/07/2024 0922    ALKPHOS 52 11/23/2024 2350    ALKPHOS 64 10/07/2024 0922    AST 32 11/23/2024 2350    AST 19 10/07/2024 0922    ALT 30 11/23/2024 2350    ALT 20 10/07/2024 0922            No results found for: \"CHOL\"  No results found for: \"HDL\"  No results found for: \"LDLCALC\"  No results found for: \"TRIG\"  No results found for: \"CHOLHDL\"    IMAGING: No results found.     Cardiac testing:   No results found for this or any previous visit.    No results found for this or any previous visit.    No results found for this or any previous visit.    No results found for this or any previous visit.          I reviewed and interpreted the following LABS/EKG/TELE/IMAGING and below is summary of my interpretation (if data available):    LABS:nl    Current EKG and Rhythm Strip:nl ekg    Past EKGs and RHYTHM strip: afib w RVR     HOLTER/EVENT Monitor:PAT/SVT no afib    Nl echo        "

## 2025-01-03 NOTE — PROGRESS NOTES
HEART AND VASCULAR  CARDIAC ELECTROPHYSIOLOGY   HEART RHYTHM CENTER  [facility]    Outpatient New Consult    Today's Date: 25        Patient name: Estella Laird  YOB: 1965  Sex: female         Chief Complaint: Referral from Dr Capps      ASSESSMENT:  Problem List Items Addressed This Visit    None  Visit Diagnoses         Atrial fibrillation with rapid ventricular response (HCC)        Relevant Orders    POCT ECG          58 yo female  1) Paroxysmal afib, one DCCV and one converted after meds. Doesn't tolerate rate control. Very symptomatic with RVR and has frequent brief PAT/SVT episodes on Zio also symptomatic. She is incredibly anxious since dx of this.   Did not tolerate ELiquis (vague side effects, sweating, fatigue)  [id number]=7 for female. FMHx of various heart conditions but parents didn't have afib. Son  age 5 pulm atresia. Modest ETOh, no wine in months since afib dx. No symptoms of MONCHO. Thin. She has relatively not major risk factors for afib other than age.  2) Anxiety      PLAN:  Recommend Atrial fibrillation and/or flutter ablation.  Additional arrhythmias may also be targeted. Transesophageal echocardiogram maybe used to rule out thrombus. Risks and benefits discussed with patient and family. Explained risk of rare but serious complications like heart perforation, stroke, heart attack, kidney injury. Energy source for ablation my vary including radiofrequency, pulsed field (PFA), or cryoablation. PFA ablation does not have known esophageal injury risk so general safer from this life threatening complication. Risks of phrenic nerve (diaphragm) and lung injury also is less with this energy source.    Explained ablation for atrial fibrillation is treatment not a cure, about 20% of patient will opt for second ablation. We went over usually recovery and expectations of going through procedure.   3. Need to start anticoagulation 1 month before ablation and 2  months after. Will try xarelto since didn't tolerate Eliquis.    She does not want to wait until March for her ablation, ok to have with one of my partners if sooner available.   Orders Placed This Encounter   Procedures    POCT ECG     There are no discontinued medications.      .............................................................................................    HPI/Subjective:     58 yo female  1) Paroxysmal afib, one DCCV and one converted after meds. Doesn't tolerate rate control. Very symptomatic with RVR and has frequent brief PAT/SVT episodes on Zio also symptomatic. She is incredibly anxious since dx of this.   Did not tolerate ELiquis (vague side effects, sweating, fatigue)  [id number]=8 for female. FMHx of various heart conditions but parents didn't have afib. Son  age 5 pulm atresia. Modest ETOh, no wine in months since afib dx. No symptoms of MONCHO. Thin. She has relatively not major risk factors for afib other than age.  2) Anxiety    Please note HPI is listed by problem with with update following it, it is copied again in the assessment above and reflects medical decision making as well.       Complete 12 point ROS reviewed and otherwise non pertinent or negative except as per HPI pertinent positives in Cardiovascular and Respiratory emphasized. Please see paper chart for outpatient clinic patients where the patient completed the 12 point ROS survey.           Past Medical History:   Diagnosis Date    A-fib ([facility]) 2024    Anxiety     Breast lump in female     left-  lumpectomy today 10/31/2023    Hypertension     SVT (supraventricular tachycardia) ([facility]) 2024       Allergies   Allergen Reactions    Celebrex [Celecoxib] Tongue Swelling    Sulfa Antibiotics Hives and Tongue Swelling    Amlodipine Other (See Comments)     Possible  ankle swelling on 5 mg    Lisinopril Other (See Comments)     dizziness    Methocarbamol Other (See Comments)     Nightmares    Other Sneezing      Seasonal allergies    Sulfamethoxazole-Trimethoprim Hives     I reviewed the Home Medication list and Allergies in the chart.   Scheduled Meds:  Current Outpatient Medications   Medication Sig Dispense Refill    ALPRAZolam (XANAX) 0.25 mg tablet Take 0.25 mg by mouth Three times daily as needed      clonazePAM (KlonoPIN) 0.5 mg tablet Take 0.5 mg by mouth daily at bedtime      desonide (DESOWEN) 0.05 % cream Apply topically if needed for irritation      famotidine (PEPCID) 40 MG tablet Take 1 tablet (40 mg total) by mouth 2 (two) times a day 60 tablet 2    methylPREDNISolone acetate (DEPO-MEDROL) 40 mg/mL injection administered      valACYclovir (VALTREX) 1,000 mg tablet if needed      valsartan (DIOVAN) 40 mg tablet 80 mg daily      Miconazole-Zinc Oxide-Petrolat 0.25-15-81.35 % OINT if needed (for rash on lips)      tacrolimus (PROTOPIC) 0.1 % ointment APPLY TO AFFECTED AREA S) TWO TIMES A DAY AS NEEDED       No current facility-administered medications for this visit.     PRN Meds:.        Family History   Problem Relation Age of Onset    Breast cancer Mother 63        again at 71 years old.    No Known Problems Father     No Known Problems Daughter     No Known Problems Maternal Grandmother     No Known Problems Maternal Grandfather     No Known Problems Paternal Grandmother     No Known Problems Paternal Grandfather     Breast cancer Maternal Aunt 65    Pancreatic cancer Maternal Uncle         80's       Social History     Socioeconomic History    Marital status: /Civil Union     Spouse name: Not on file    Number of children: Not on file    Years of education: Not on file    Highest education level: Not on file   Occupational History    Occupation: [employer]   Tobacco Use    Smoking status: Former     Current packs/day: 0.00     Average packs/day: 0.3 packs/day for 20.0 years (5.0 ttl pk-yrs)     Types: Cigarettes     Start date: 2003     Quit date: 2023     Years since quittin.2     "Smokeless tobacco: Never    Tobacco comments:     1 cig a day 10/2023 Quit   Vaping Use    Vaping status: Never Used   Substance and Sexual Activity    Alcohol use: Yes     Alcohol/week: 7.0 standard drinks of alcohol     Types: 7 Glasses of wine per week     Comment: DAILY  1 GLASS WINE    Drug use: Never    Sexual activity: Not on file   Other Topics Concern    Not on file   Social History Narrative    Not on file     Social Drivers of Health     Financial Resource Strain: Not on file   Food Insecurity: Not on file   Transportation Needs: Not on file   Physical Activity: Not on file   Stress: Not on file   Social Connections: Unknown (12/3/2023)    [facility]    Social Connection and Isolation Panel [NHANES]     Frequency of Communication with Friends and Family: Not on file     Frequency of Social Gatherings with Friends and Family: Not on file     Attends [employer] Services: Not on file     Active Member of Clubs or Organizations: Not on file     Attends Club or Organization Meetings: Not on file     Marital Status:    Intimate Partner Violence: Not At Risk (12/3/2023)    [facility]    Humiliation, Afraid, Rape, and Kick questionnaire     Fear of Current or Ex-Partner: No     Emotionally Abused: No     Physically Abused: No     Sexually Abused: No   Housing Stability: Not on file         OBJECTIVE:    Ht 5' 3\" (1.6 m)   Wt 54.5 kg (120 lb 1.6 oz)   BMI 21.27 kg/m²   Vitals:    01/03/25 1452   Weight: 54.5 kg (120 lb 1.6 oz)     GEN: No acute distress, Alert and oriented, well appearing  HEENT:External ears normal, oral pharynx clear, mucous membranes moist  EYES: Pupils equal, sclera anicteric, midline, normal conjuctiva  NECK: No JVD, supple, no obvious masses or thryomegaly or goiter  CARDIOVASCULAR:  RRR, No murmur, rub, gallops S1,S2  LUNGS: Clear To auscultation bilaterally, " "normal effort, no rales, rhonchi, crackles   ABDOMEN:  nondistended,  without obvious organomegaly or ascites  EXTREMITIES/VASCULAR:  No edema. warm an well perfused.  PSYCH: Normal Affect,  linear speech pattern without evidence of psychosis.   NEURO: Grossly intact, moving all extremiteis equal, face symmetric, alert and responsive, no obvious focal defecits   GAIT:  Ambulates normally without difficulty  HEME: No bleeding, bruising, petechia, purpura   SKIN: No significant rashes on visibile skin, warm, no diaphoresis or pallor.     Lab Results:       LABS:      Chemistry        Component Value Date/Time    K 3.6 11/23/2024 2350    K 4.2 10/07/2024 0922     (H) 11/23/2024 2350     10/07/2024 0922    CO2 26 11/23/2024 2350    CO2 27 10/07/2024 0922    BUN 10 11/23/2024 2350    BUN 8 10/07/2024 0922    CREATININE 0.55 (L) 11/23/2024 2350    CREATININE 0.70 10/07/2024 0922        Component Value Date/Time    CALCIUM 8.8 11/23/2024 2350    CALCIUM 9.5 10/07/2024 0922    ALKPHOS 52 11/23/2024 2350    ALKPHOS 64 10/07/2024 0922    AST 32 11/23/2024 2350    AST 19 10/07/2024 0922    ALT 30 11/23/2024 2350    ALT 20 10/07/2024 0922            No results found for: \"CHOL\"  No results found for: \"HDL\"  No results found for: \"LDLCALC\"  No results found for: \"TRIG\"  No results found for: \"CHOLHDL\"    IMAGING: No results found.     Cardiac testing:   No results found for this or any previous visit.    No results found for this or any previous visit.    No results found for this or any previous visit.    No results found for this or any previous visit.          I reviewed and interpreted the following LABS/EKG/TELE/IMAGING and below is summary of my interpretation (if data available):    LABS:nl    Current EKG and Rhythm Strip:nl ekg    Past EKGs and RHYTHM strip: afib w RVR     HOLTER/EVENT Monitor:PAT/SVT no afib    Nl echo        "

## 2025-01-03 NOTE — TELEPHONE ENCOUNTER
"Per  OK for colleague to do ablation since patient doesn't want to wait until March for when  has opening. Also patient needs to start Xarelto one month before ablation and continue two months after.    Patient requested  since  doesn't have any sooner openings.     Per Secure Message to , he is OK with patient starting Xarelto now and being scheduled w/ on 1/20/25.     Patient scheduled for MARIAH/AFIB PFA Ablation on 1/20/25 at [facility] with Dr. Haynes.      Instructions sent to patient through [employer].      Patient aware of all general instructions.    Medication holds:   Valsartan - Do not take day before and morning of procedure.     Blood Thinners:  Xarelto - START NOW (1/3/25) and continue for 2 months after ablation    Labs to be done on 1/6/25:  CMP / CBC (FASTING 8 HOURS)    MARIAH ordered/completed.      Thank you,  Ellen \"Crystal\" Christopher          "

## 2025-01-06 ENCOUNTER — APPOINTMENT (OUTPATIENT)
Dept: LAB | Age: 60
End: 2025-01-06
Payer: COMMERCIAL

## 2025-01-06 LAB
ALBUMIN SERPL BCG-MCNC: 4.3 G/DL (ref 3.5–5)
ALP SERPL-CCNC: 64 U/L (ref 34–104)
ALT SERPL W P-5'-P-CCNC: 22 U/L (ref 7–52)
ANION GAP SERPL CALCULATED.3IONS-SCNC: 7 MMOL/L (ref 4–13)
AST SERPL W P-5'-P-CCNC: 19 U/L (ref 13–39)
BASOPHILS # BLD AUTO: 0.06 THOUSANDS/ΜL (ref 0–0.1)
BASOPHILS NFR BLD AUTO: 1 % (ref 0–1)
BILIRUB SERPL-MCNC: 0.79 MG/DL (ref 0.2–1)
BUN SERPL-MCNC: 10 MG/DL (ref 5–25)
CALCIUM SERPL-MCNC: 9.3 MG/DL (ref 8.4–10.2)
CHLORIDE SERPL-SCNC: 104 MMOL/L (ref 96–108)
CO2 SERPL-SCNC: 29 MMOL/L (ref 21–32)
CREAT SERPL-MCNC: 0.65 MG/DL (ref 0.6–1.3)
EOSINOPHIL # BLD AUTO: 0.2 THOUSAND/ΜL (ref 0–0.61)
EOSINOPHIL NFR BLD AUTO: 3 % (ref 0–6)
ERYTHROCYTE [DISTWIDTH] IN BLOOD BY AUTOMATED COUNT: 11.5 % (ref 11.6–15.1)
GFR SERPL CREATININE-BSD FRML MDRD: 96 ML/MIN/1.73SQ M
GLUCOSE P FAST SERPL-MCNC: 88 MG/DL (ref 65–99)
HCT VFR BLD AUTO: 43.7 % (ref 34.8–46.1)
HGB BLD-MCNC: 14.7 G/DL (ref 11.5–15.4)
IMM GRANULOCYTES # BLD AUTO: 0.02 THOUSAND/UL (ref 0–0.2)
IMM GRANULOCYTES NFR BLD AUTO: 0 % (ref 0–2)
LYMPHOCYTES # BLD AUTO: 1.88 THOUSANDS/ΜL (ref 0.6–4.47)
LYMPHOCYTES NFR BLD AUTO: 30 % (ref 14–44)
MCH RBC QN AUTO: 30.3 PG (ref 26.8–34.3)
MCHC RBC AUTO-ENTMCNC: 33.6 G/DL (ref 31.4–37.4)
MCV RBC AUTO: 90 FL (ref 82–98)
MONOCYTES # BLD AUTO: 0.52 THOUSAND/ΜL (ref 0.17–1.22)
MONOCYTES NFR BLD AUTO: 8 % (ref 4–12)
NEUTROPHILS # BLD AUTO: 3.59 THOUSANDS/ΜL (ref 1.85–7.62)
NEUTS SEG NFR BLD AUTO: 58 % (ref 43–75)
NRBC BLD AUTO-RTO: 0 /100 WBCS
PLATELET # BLD AUTO: 410 THOUSANDS/UL (ref 149–390)
PMV BLD AUTO: 10.2 FL (ref 8.9–12.7)
POTASSIUM SERPL-SCNC: 4.1 MMOL/L (ref 3.5–5.3)
PROT SERPL-MCNC: 6.9 G/DL (ref 6.4–8.4)
RBC # BLD AUTO: 4.85 MILLION/UL (ref 3.81–5.12)
SODIUM SERPL-SCNC: 140 MMOL/L (ref 135–147)
WBC # BLD AUTO: 6.27 THOUSAND/UL (ref 4.31–10.16)

## 2025-01-06 PROCEDURE — 36415 COLL VENOUS BLD VENIPUNCTURE: CPT

## 2025-01-06 PROCEDURE — 80053 COMPREHEN METABOLIC PANEL: CPT

## 2025-01-06 PROCEDURE — 85025 COMPLETE CBC W/AUTO DIFF WBC: CPT

## 2025-01-09 DIAGNOSIS — I48.91 ATRIAL FIBRILLATION WITH RAPID VENTRICULAR RESPONSE (HCC): Primary | ICD-10-CM

## 2025-01-09 NOTE — TELEPHONE ENCOUNTER
Patient called requesting refill for Xarelto 20 mg tablet    Dr Guerrero had given the patient samples and she will be out of the medication soon and is asking for a prescription to be sent to [id number]    Patient reports she doesn't feel great on it ,but wants to continue to take the medication .

## 2025-01-17 ENCOUNTER — TELEPHONE (OUTPATIENT)
Age: 60
End: 2025-01-17

## 2025-01-17 NOTE — TELEPHONE ENCOUNTER
Caller: Estlela Laird     Doctor: [place]     Reason for call: Patient is having an ablation done on 1/20/25. She is scheduled for a follow up visit for the ablation on 2/27/25. Patient is questioning if this appointment is too long after her procedure. She would like clarification and is okay with a message being left for her if she can not be reached. She was scheduled for the appointment and was not consulted.     Call back#: [phone]

## 2025-01-18 ENCOUNTER — ANESTHESIA EVENT (OUTPATIENT)
Dept: NON INVASIVE DIAGNOSTICS | Facility: HOSPITAL | Age: 60
End: 2025-01-18
Payer: COMMERCIAL

## 2025-01-18 NOTE — ANESTHESIA PREPROCEDURE EVALUATION
Procedure:  Cardiac eps/afib ablation PFA (Chest)    Relevant Problems   ANESTHESIA (within normal limits)   (-) History of anesthesia complications      CARDIO   (+) Atrial fibrillation (HCC)   (+) Hypertension   (+) Hypertriglyceridemia      ENDO (within normal limits)      GI/HEPATIC   (+) Dysphagia      /RENAL (within normal limits)      HEMATOLOGY (within normal limits)      MUSCULOSKELETAL (within normal limits)      NEURO/PSYCH (within normal limits)      PULMONARY (within normal limits)        Physical Exam    Airway    Mallampati score: II  TM Distance: >3 FB  Neck ROM: full     Dental   No notable dental hx     Cardiovascular  Rhythm: regular, Rate: normal    Pulmonary  Pulmonary exam normal Breath sounds clear to auscultation    Other Findings  post-pubertal.      Anesthesia Plan  ASA Score- 2     Anesthesia Type- general with ASA Monitors.         Additional Monitors:     Airway Plan: ETT.           Plan Factors-Exercise tolerance (METS): >4 METS.    Chart reviewed. EKG reviewed. Imaging results reviewed. Existing labs reviewed. Patient summary reviewed.    Patient is not a current smoker.  Patient did not smoke on day of surgery.            Induction- intravenous.    Postoperative Plan- . Planned trial extubation    Perioperative Resuscitation Plan - Level 1 - Full Code.       Informed Consent- Anesthetic plan and risks discussed with patient and spouse.  I personally reviewed this patient with the CRNA. Discussed and agreed on the Anesthesia Plan with the CRNA..      NPO Status:  No vitals data found for the desired time range.    NPO and allergies verified.    Plan:  GETA      Risks and benefits of general anesthesia were discussed with the patient.  Discussed risks of anesthesia including, but not limited to, the risk of dental injury, n/v, sore throat, corneal abrasions, and arrhythmias.  All questions were answered.  Anesthesia consent was obtained from the patient.

## 2025-01-20 ENCOUNTER — ANESTHESIA (OUTPATIENT)
Dept: NON INVASIVE DIAGNOSTICS | Facility: HOSPITAL | Age: 60
End: 2025-01-20
Payer: COMMERCIAL

## 2025-01-20 ENCOUNTER — HOSPITAL ENCOUNTER (OUTPATIENT)
Dept: NON INVASIVE DIAGNOSTICS | Facility: HOSPITAL | Age: 60
Discharge: HOME | End: 2025-01-20
Attending: INTERNAL MEDICINE
Payer: COMMERCIAL

## 2025-01-20 ENCOUNTER — HOSPITAL ENCOUNTER (OUTPATIENT)
Facility: HOSPITAL | Age: 60
Setting detail: OUTPATIENT SURGERY
Discharge: HOME | End: 2025-01-20
Attending: INTERNAL MEDICINE | Admitting: INTERNAL MEDICINE
Payer: COMMERCIAL

## 2025-01-20 VITALS
RESPIRATION RATE: 18 BRPM | DIASTOLIC BLOOD PRESSURE: 83 MMHG | BODY MASS INDEX: 20.38 KG/M2 | TEMPERATURE: 98.2 F | WEIGHT: 115 LBS | HEIGHT: 63 IN | SYSTOLIC BLOOD PRESSURE: 139 MMHG | OXYGEN SATURATION: 98 % | HEART RATE: 98 BPM

## 2025-01-20 DIAGNOSIS — I48.91 ATRIAL FIBRILLATION WITH RAPID VENTRICULAR RESPONSE (HCC): ICD-10-CM

## 2025-01-20 PROBLEM — G89.29 CHRONIC NECK PAIN: Status: RESOLVED | Noted: 2018-05-14 | Resolved: 2025-01-20

## 2025-01-20 PROBLEM — M54.2 CHRONIC NECK PAIN: Status: RESOLVED | Noted: 2018-05-14 | Resolved: 2025-01-20

## 2025-01-20 LAB
ANION GAP SERPL CALCULATED.3IONS-SCNC: 8 MMOL/L (ref 4–13)
ATRIAL RATE: 77 BPM
ATRIAL RATE: 92 BPM
ATRIAL RATE: 94 BPM
BUN SERPL-MCNC: 12 MG/DL (ref 5–25)
CALCIUM SERPL-MCNC: 9.5 MG/DL (ref 8.4–10.2)
CHLORIDE SERPL-SCNC: 105 MMOL/L (ref 96–108)
CO2 SERPL-SCNC: 29 MMOL/L (ref 21–32)
CREAT SERPL-MCNC: 0.66 MG/DL (ref 0.6–1.3)
ERYTHROCYTE [DISTWIDTH] IN BLOOD BY AUTOMATED COUNT: 11.8 % (ref 11.6–15.1)
GFR SERPL CREATININE-BSD FRML MDRD: 96 ML/MIN/1.73SQ M
GLUCOSE P FAST SERPL-MCNC: 90 MG/DL (ref 65–99)
GLUCOSE SERPL-MCNC: 90 MG/DL (ref 65–140)
HCT VFR BLD AUTO: 43.6 % (ref 34.8–46.1)
HGB BLD-MCNC: 14.7 G/DL (ref 11.5–15.4)
INR PPP: 1.05 (ref 0.85–1.19)
KCT BLD-ACNC: 287 SEC (ref 89–137)
KCT BLD-ACNC: 299 SEC (ref 89–137)
KCT BLD-ACNC: 299 SEC (ref 89–137)
KCT BLD-ACNC: 318 SEC (ref 89–137)
MCH RBC QN AUTO: 30.1 PG (ref 26.8–34.3)
MCHC RBC AUTO-ENTMCNC: 33.7 G/DL (ref 31.4–37.4)
MCV RBC AUTO: 89 FL (ref 82–98)
P AXIS: 63 DEGREES
P AXIS: 71 DEGREES
P AXIS: 74 DEGREES
PLATELET # BLD AUTO: 403 THOUSANDS/UL (ref 149–390)
PMV BLD AUTO: 9.6 FL (ref 8.9–12.7)
POTASSIUM SERPL-SCNC: 3.7 MMOL/L (ref 3.5–5.3)
PR INTERVAL: 138 MS
PR INTERVAL: 148 MS
PR INTERVAL: 150 MS
PROTHROMBIN TIME: 14 SECONDS (ref 12.3–15)
QRS AXIS: -2 DEGREES
QRS AXIS: 77 DEGREES
QRS AXIS: 78 DEGREES
QRSD INTERVAL: 108 MS
QRSD INTERVAL: 108 MS
QRSD INTERVAL: 90 MS
QT INTERVAL: 386 MS
QT INTERVAL: 388 MS
QT INTERVAL: 392 MS
QTC INTERVAL: 440 MS
QTC INTERVAL: 482 MS
QTC INTERVAL: 484 MS
RBC # BLD AUTO: 4.88 MILLION/UL (ref 3.81–5.12)
SODIUM SERPL-SCNC: 142 MMOL/L (ref 135–147)
SPECIMEN SOURCE: ABNORMAL
T WAVE AXIS: 32 DEGREES
T WAVE AXIS: 68 DEGREES
T WAVE AXIS: 68 DEGREES
VENTRICULAR RATE: 77 BPM
VENTRICULAR RATE: 92 BPM
VENTRICULAR RATE: 94 BPM
WBC # BLD AUTO: 6.76 THOUSAND/UL (ref 4.31–10.16)

## 2025-01-20 PROCEDURE — 93656 COMPRE EP EVAL ABLTJ ATR FIB: CPT | Performed by: INTERNAL MEDICINE

## 2025-01-20 PROCEDURE — C1769 GUIDE WIRE: HCPCS | Performed by: INTERNAL MEDICINE

## 2025-01-20 PROCEDURE — C1893 INTRO/SHEATH, FIXED,NON-PEEL: HCPCS | Performed by: INTERNAL MEDICINE

## 2025-01-20 PROCEDURE — C1894 INTRO/SHEATH, NON-LASER: HCPCS | Performed by: INTERNAL MEDICINE

## 2025-01-20 PROCEDURE — C1730 CATH, EP, 19 OR FEW ELECT: HCPCS | Performed by: INTERNAL MEDICINE

## 2025-01-20 PROCEDURE — C1760 CLOSURE DEV, VASC: HCPCS | Performed by: INTERNAL MEDICINE

## 2025-01-20 PROCEDURE — 76937 US GUIDE VASCULAR ACCESS: CPT | Performed by: INTERNAL MEDICINE

## 2025-01-20 PROCEDURE — C1733 CATH, EP, OTHR THAN COOL-TIP: HCPCS | Performed by: INTERNAL MEDICINE

## 2025-01-20 PROCEDURE — 80048 BASIC METABOLIC PNL TOTAL CA: CPT | Performed by: PHYSICIAN ASSISTANT

## 2025-01-20 PROCEDURE — 85347 COAGULATION TIME ACTIVATED: CPT

## 2025-01-20 PROCEDURE — 93005 ELECTROCARDIOGRAM TRACING: CPT

## 2025-01-20 PROCEDURE — 93010 ELECTROCARDIOGRAM REPORT: CPT | Performed by: INTERNAL MEDICINE

## 2025-01-20 PROCEDURE — C1759 CATH, INTRA ECHOCARDIOGRAPHY: HCPCS | Performed by: INTERNAL MEDICINE

## 2025-01-20 PROCEDURE — 85027 COMPLETE CBC AUTOMATED: CPT | Performed by: PHYSICIAN ASSISTANT

## 2025-01-20 PROCEDURE — 85610 PROTHROMBIN TIME: CPT | Performed by: PHYSICIAN ASSISTANT

## 2025-01-20 PROCEDURE — C1766 INTRO/SHEATH,STRBLE,NON-PEEL: HCPCS | Performed by: INTERNAL MEDICINE

## 2025-01-20 PROCEDURE — C1732 CATH, EP, DIAG/ABL, 3D/VECT: HCPCS | Performed by: INTERNAL MEDICINE

## 2025-01-20 DEVICE — DVC VASC CLSR VASCADE MVP XL 10-12FR: Type: IMPLANTABLE DEVICE | Site: GROIN | Status: FUNCTIONAL

## 2025-01-20 DEVICE — DVC VASC CLSR VASCADE MVP 6-12FR: Type: IMPLANTABLE DEVICE | Site: GROIN | Status: FUNCTIONAL

## 2025-01-20 RX ORDER — DEXAMETHASONE SODIUM PHOSPHATE 10 MG/ML
INJECTION, SOLUTION INTRAMUSCULAR; INTRAVENOUS AS NEEDED
Status: DISCONTINUED | OUTPATIENT
Start: 2025-01-20 | End: 2025-01-20

## 2025-01-20 RX ORDER — PROTAMINE SULFATE 10 MG/ML
INJECTION, SOLUTION INTRAVENOUS AS NEEDED
Status: DISCONTINUED | OUTPATIENT
Start: 2025-01-20 | End: 2025-01-20

## 2025-01-20 RX ORDER — MIDAZOLAM HYDROCHLORIDE 2 MG/2ML
INJECTION, SOLUTION INTRAMUSCULAR; INTRAVENOUS AS NEEDED
Status: DISCONTINUED | OUTPATIENT
Start: 2025-01-20 | End: 2025-01-20

## 2025-01-20 RX ORDER — PANTOPRAZOLE SODIUM 40 MG/10ML
40 INJECTION, POWDER, LYOPHILIZED, FOR SOLUTION INTRAVENOUS ONCE
Status: DISCONTINUED | OUTPATIENT
Start: 2025-01-20 | End: 2025-01-20 | Stop reason: HOSPADM

## 2025-01-20 RX ORDER — ROCURONIUM BROMIDE 10 MG/ML
INJECTION, SOLUTION INTRAVENOUS AS NEEDED
Status: DISCONTINUED | OUTPATIENT
Start: 2025-01-20 | End: 2025-01-20

## 2025-01-20 RX ORDER — ONDANSETRON 2 MG/ML
INJECTION INTRAMUSCULAR; INTRAVENOUS AS NEEDED
Status: DISCONTINUED | OUTPATIENT
Start: 2025-01-20 | End: 2025-01-20

## 2025-01-20 RX ORDER — GLYCOPYRROLATE 0.2 MG/ML
INJECTION INTRAMUSCULAR; INTRAVENOUS AS NEEDED
Status: DISCONTINUED | OUTPATIENT
Start: 2025-01-20 | End: 2025-01-20

## 2025-01-20 RX ORDER — PROPOFOL 10 MG/ML
INJECTION, EMULSION INTRAVENOUS AS NEEDED
Status: DISCONTINUED | OUTPATIENT
Start: 2025-01-20 | End: 2025-01-20

## 2025-01-20 RX ORDER — ACETAMINOPHEN 325 MG/1
650 TABLET ORAL EVERY 4 HOURS PRN
Status: DISCONTINUED | OUTPATIENT
Start: 2025-01-20 | End: 2025-01-20 | Stop reason: HOSPADM

## 2025-01-20 RX ORDER — FENTANYL CITRATE/PF 50 MCG/ML
25 SYRINGE (ML) INJECTION
Status: DISCONTINUED | OUTPATIENT
Start: 2025-01-20 | End: 2025-01-20 | Stop reason: HOSPADM

## 2025-01-20 RX ORDER — SODIUM CHLORIDE 9 MG/ML
INJECTION, SOLUTION INTRAVENOUS CONTINUOUS PRN
Status: DISCONTINUED | OUTPATIENT
Start: 2025-01-20 | End: 2025-01-20

## 2025-01-20 RX ORDER — ONDANSETRON 2 MG/ML
4 INJECTION INTRAMUSCULAR; INTRAVENOUS ONCE AS NEEDED
Status: COMPLETED | OUTPATIENT
Start: 2025-01-20 | End: 2025-01-20

## 2025-01-20 RX ORDER — COLCHICINE 0.6 MG/1
0.6 TABLET ORAL DAILY
Qty: 30 TABLET | Refills: 0 | Status: SHIPPED | OUTPATIENT
Start: 2025-01-20

## 2025-01-20 RX ORDER — FENTANYL CITRATE 50 UG/ML
INJECTION, SOLUTION INTRAMUSCULAR; INTRAVENOUS AS NEEDED
Status: DISCONTINUED | OUTPATIENT
Start: 2025-01-20 | End: 2025-01-20

## 2025-01-20 RX ORDER — LIDOCAINE HYDROCHLORIDE 10 MG/ML
INJECTION, SOLUTION EPIDURAL; INFILTRATION; INTRACAUDAL; PERINEURAL AS NEEDED
Status: DISCONTINUED | OUTPATIENT
Start: 2025-01-20 | End: 2025-01-20

## 2025-01-20 RX ORDER — SODIUM CHLORIDE 9 MG/ML
20 INJECTION, SOLUTION INTRAVENOUS CONTINUOUS
Status: DISCONTINUED | OUTPATIENT
Start: 2025-01-20 | End: 2025-01-20 | Stop reason: HOSPADM

## 2025-01-20 RX ORDER — SODIUM CHLORIDE 9 MG/ML
20 INJECTION, SOLUTION INTRAVENOUS ONCE
Status: DISCONTINUED | OUTPATIENT
Start: 2025-01-20 | End: 2025-01-20 | Stop reason: HOSPADM

## 2025-01-20 RX ORDER — HEPARIN SODIUM 1000 [USP'U]/ML
INJECTION, SOLUTION INTRAVENOUS; SUBCUTANEOUS CODE/TRAUMA/SEDATION MEDICATION
Status: DISCONTINUED | OUTPATIENT
Start: 2025-01-20 | End: 2025-01-20 | Stop reason: HOSPADM

## 2025-01-20 RX ADMIN — PHENYLEPHRINE HYDROCHLORIDE 20 MCG/MIN: 10 INJECTION INTRAVENOUS at 15:24

## 2025-01-20 RX ADMIN — FENTANYL CITRATE 50 MCG: 50 INJECTION INTRAMUSCULAR; INTRAVENOUS at 16:05

## 2025-01-20 RX ADMIN — PROTAMINE SULFATE 50 MG: 10 INJECTION, SOLUTION INTRAVENOUS at 15:58

## 2025-01-20 RX ADMIN — SODIUM CHLORIDE: 0.9 INJECTION, SOLUTION INTRAVENOUS at 13:32

## 2025-01-20 RX ADMIN — SODIUM CHLORIDE 20 ML/HR: 0.9 INJECTION, SOLUTION INTRAVENOUS at 10:44

## 2025-01-20 RX ADMIN — ROCURONIUM BROMIDE 20 MG: 10 INJECTION, SOLUTION INTRAVENOUS at 14:56

## 2025-01-20 RX ADMIN — LIDOCAINE HYDROCHLORIDE 50 MG: 10 INJECTION, SOLUTION EPIDURAL; INFILTRATION; INTRACAUDAL; PERINEURAL at 13:56

## 2025-01-20 RX ADMIN — PROPOFOL 100 MG: 10 INJECTION, EMULSION INTRAVENOUS at 13:56

## 2025-01-20 RX ADMIN — DEXAMETHASONE SODIUM PHOSPHATE 10 MG: 10 INJECTION, SOLUTION INTRAMUSCULAR; INTRAVENOUS at 13:57

## 2025-01-20 RX ADMIN — ONDANSETRON 4 MG: 2 INJECTION INTRAMUSCULAR; INTRAVENOUS at 16:35

## 2025-01-20 RX ADMIN — FENTANYL CITRATE 25 MCG: 50 INJECTION INTRAMUSCULAR; INTRAVENOUS at 14:10

## 2025-01-20 RX ADMIN — ROCURONIUM BROMIDE 50 MG: 10 INJECTION, SOLUTION INTRAVENOUS at 13:56

## 2025-01-20 RX ADMIN — FENTANYL CITRATE 25 MCG: 50 INJECTION INTRAMUSCULAR; INTRAVENOUS at 13:56

## 2025-01-20 RX ADMIN — SODIUM CHLORIDE: 0.9 INJECTION, SOLUTION INTRAVENOUS at 15:38

## 2025-01-20 RX ADMIN — ROCURONIUM BROMIDE 30 MG: 10 INJECTION, SOLUTION INTRAVENOUS at 15:12

## 2025-01-20 RX ADMIN — GLYCOPYRROLATE 0.1 MG: 0.2 INJECTION, SOLUTION INTRAMUSCULAR; INTRAVENOUS at 15:10

## 2025-01-20 RX ADMIN — ONDANSETRON 4 MG: 2 INJECTION INTRAMUSCULAR; INTRAVENOUS at 13:56

## 2025-01-20 RX ADMIN — GLYCOPYRROLATE 0.2 MG: 0.2 INJECTION, SOLUTION INTRAMUSCULAR; INTRAVENOUS at 15:08

## 2025-01-20 RX ADMIN — SUGAMMADEX 200 MG: 100 INJECTION, SOLUTION INTRAVENOUS at 16:04

## 2025-01-20 RX ADMIN — MIDAZOLAM 2 MG: 1 INJECTION INTRAMUSCULAR; INTRAVENOUS at 13:50

## 2025-01-20 RX ADMIN — RIVAROXABAN 20 MG: 20 TABLET, FILM COATED ORAL at 18:09

## 2025-01-20 RX ADMIN — ACETAMINOPHEN 650 MG: 325 TABLET, FILM COATED ORAL at 17:35

## 2025-01-20 NOTE — INTERVAL H&P NOTE
H&P reviewed. After examining the patient I find no changes in the patients condition since the H&P had been written.    Estella Laird is a 58yo female with hx of paf, htn, hld, ef 60%, anxiety who presents to [facility] for afib ablation. Pt has hx of afib and was cardioverted in ER on 11/23/2024 in [facility]. And inititally on eloquis but did not tolerate 2/2 headache and epistaxis. Pt seen in office by Dr. Guerrero and restarted 2 weeks ago on xaralto and scheduled for afib ablation today.    Vitals:    01/20/25 1054   BP: 153/87   Pulse: 83   Resp: 16   Temp: 98.5 °F (36.9 °C)   SpO2: 97%     Review of Systems - Negative except anxious     PE: vss  Lungs cta b/l  Cardiac: rrr- rrm  Abd: soft, ntnd  Ext: no edema, pulses intact    EKG: nsr, hr 70's    Plan:   -npo for afib ablation today   -presents in nsr  -last dose xaralto 4pm last pm   -cont xaralto post procedure  -bedrest post procedure  -possible d/c home post procedure vs monitor o/n.

## 2025-01-20 NOTE — ANESTHESIA POSTPROCEDURE EVALUATION
Post-Op Assessment Note    CV Status:  Stable  Pain Score: 0    Pain management: adequate       Mental Status:  Arousable and sleepy   Hydration Status:  Stable   PONV Controlled:  Controlled   Airway Patency:  Patent     Post Op Vitals Reviewed: Yes    No anethesia notable event occurred.    Staff: CRNA           Last Filed PACU Vitals:  Vitals Value Taken Time   Temp 98    Pulse 75    /77    Resp 14    SpO2 99

## 2025-01-20 NOTE — DISCHARGE INSTR - AVS FIRST PAGE
Medication Plan:    Take:   Take home xaralto 20mg once daily for at least 2 months. Do not stop or miss doses unless directed or discussed with Dr. Haynes or Dr Guerrero. You can continue your other home meds as directed.      Post Procedure Care instructions:    For 7 days:  You may shower after the first 24 hours   Allow gentle soap and water to wash over incision.  Do not scrub. Pat to dry   If chaffing with undergarments, wear bandage during the day to prevent irritation. Maximum 5 days and change bandage daily.  Do not use lotions/powders/creams    You may walk or go up/down stairs      Restrictions for 1 week:   Do not lift greater than 10lbs    Avoid running/jumping    No submerging wound in water (No bath/hot tubs/pools/etc)    Normal healing process   You may have bruising that extends into your genitalia and/or down your thigh toward the knee. This can take several weeks to resolve.  Pain may worsen over the first 48 hours as you increase your activity and then will improve   Tylenol (acetaminophen)  Ice   You may have a small pea sized lump   Your arrhythmia may reoccur in the next 3 months  This is due to inflammation/irritation from the procedure    When to call clinic:    Redness or swelling at incision site   Bleeding or expanding lump in groin    Opening and/or drainage from the incision   Temperature >100.4 F   If your arrhythmia reoccurs call if:  You are symptomatic (dizziness/lightheadedness/heart racing/etc)    If you have any questions:   [phone] 8:30am-5:30pm  [phone] After hours  [phone] Appointments

## 2025-01-21 ENCOUNTER — TELEPHONE (OUTPATIENT)
Age: 60
End: 2025-01-21

## 2025-01-21 NOTE — TELEPHONE ENCOUNTER
Received call from pt requesting to speak with Dr Haynes about her procedure yesterday.  Pt had an afib ablation done yesterday 1/20 and was not able to talk with Dr Haynes after the procedure was over.  Pt would like a better understanding of what was done during the procedure.  Please review and call patient back at [phone] to discuss the details of her procedure.  Thank you.

## 2025-01-22 NOTE — ANESTHESIA POSTPROCEDURE EVALUATION
Post-Op Assessment Note    CV Status:  Stable    Pain management: adequate       Mental Status:  Alert and awake   Hydration Status:  Euvolemic   PONV Controlled:  Controlled   Airway Patency:  Patent     Post Op Vitals Reviewed: Yes    No anethesia notable event occurred.    Staff: Anesthesiologist           Last Filed PACU Vitals:  Vitals Value Taken Time   Temp 97.5 °F (36.4 °C) 01/20/25 1715   Pulse 101 01/20/25 1724   /76 01/20/25 1715   Resp 20 01/20/25 1724   SpO2 98 % 01/20/25 1716   Vitals shown include unfiled device data.    Modified Tresa:     Vitals Value Taken Time   Activity 2 01/20/25 1715   Respiration 2 01/20/25 1715   Circulation 2 01/20/25 1715   Consciousness 2 01/20/25 1715   Oxygen Saturation 2 01/20/25 1715     Modified Tresa Score: 10

## 2025-01-23 ENCOUNTER — TELEPHONE (OUTPATIENT)
Age: 60
End: 2025-01-23

## 2025-01-23 ENCOUNTER — TELEPHONE (OUTPATIENT)
Dept: CARDIOLOGY CLINIC | Facility: CLINIC | Age: 60
End: 2025-01-23

## 2025-01-23 DIAGNOSIS — I48.91 ATRIAL FIBRILLATION WITH RAPID VENTRICULAR RESPONSE (HCC): ICD-10-CM

## 2025-01-23 NOTE — TELEPHONE ENCOUNTER
Samples of this drug were given to the patient, quantity 28, Lot Number [id number]. The following was discussed with the patient as indicated: administration, storage, potential interactions, side effects.

## 2025-01-23 NOTE — TELEPHONE ENCOUNTER
Pt had an ablation on 1/20 with c/o soreness and constipation with blood. Pt states she's been drinking a ton of water and chicken broth, chocolate, and coffee. Pt doesn't want to take any stool softeners as she has IBS.      Please advise.

## 2025-01-23 NOTE — TELEPHONE ENCOUNTER
Pt called in regard to a post ablation concern. Was able to transfer patient successfully to the Randa in the EP dept.

## 2025-01-24 NOTE — TELEPHONE ENCOUNTER
Spoke to patient. Provided her with the recommendations.      Patient states she has has 4-5 very long, large, pain bowel movements since 4am.  She would appreciate a call from Divya Anand's office she is very concerned about taking anything and exacerbating her IBS.  She also reports she has no way to get the items recommended. I suggested reaching out to a family member or friend to  or do home delivery.     Patient was receptive but would still appreciate a call.

## 2025-02-04 ENCOUNTER — NURSE TRIAGE (OUTPATIENT)
Dept: OTHER | Facility: OTHER | Age: 60
End: 2025-02-04

## 2025-02-06 ENCOUNTER — TELEPHONE (OUTPATIENT)
Dept: CARDIOLOGY CLINIC | Facility: CLINIC | Age: 60
End: 2025-02-06

## 2025-02-06 ENCOUNTER — TELEPHONE (OUTPATIENT)
Age: 60
End: 2025-02-06

## 2025-02-06 NOTE — TELEPHONE ENCOUNTER
Received a call from Estella, and is 17 days post ablation, and is experiencing tachycardia, HR on watch are as follows ,119,107,118  No afib noted. Wants to make sure she is ok.   Called EP and warm transferred.

## 2025-02-06 NOTE — TELEPHONE ENCOUNTER
Patient Called in because she has a history of A-Fib with RVR. Patient had a-fib ablation 1/20/25. Patient started today with tachycardia. She states rates 107-119 per [employer]. Patient is sending strips through [employer]. Patient denies Chest pain, sob, palpations, headache, dizziness, nausea, vomiting. Reports no issues with Sites.     Please advice.

## 2025-02-06 NOTE — TELEPHONE ENCOUNTER
Called back patient, ''Glue spot near surgical sight, groin area''. I stated that she can just let it fall off or pull off, thanks.

## 2025-02-06 NOTE — TELEPHONE ENCOUNTER
Patient called back regarding one more question regarding post ablation, She states that there's still a glue spot that is still not gone, she is wondering if that is ok and if it would cause a problem since there is dry blood behind it. Please advise.

## 2025-02-07 ENCOUNTER — PATIENT MESSAGE (OUTPATIENT)
Dept: CARDIOLOGY CLINIC | Facility: CLINIC | Age: 60
End: 2025-02-07

## 2025-02-11 NOTE — PATIENT COMMUNICATION
Spoke with pt, she stated her dizzy spells have subsided and BP is good she's been in normal sinus rhythm. Pt stated palpitations, SOB, irregular HB, migraines, nausea and indigestion all on occasion. Pt also stated the lumps on her leg are still big.      She would like to know if all of this is normal given it's been about 4 weeks post ablation.      Please advise.

## 2025-02-12 ENCOUNTER — TELEPHONE (OUTPATIENT)
Dept: CARDIOLOGY CLINIC | Facility: CLINIC | Age: 60
End: 2025-02-12

## 2025-02-12 ENCOUNTER — HOSPITAL ENCOUNTER (OUTPATIENT)
Dept: CT IMAGING | Facility: HOSPITAL | Age: 60
Discharge: HOME | End: 2025-02-12
Payer: COMMERCIAL

## 2025-02-12 DIAGNOSIS — R51.9 NONINTRACTABLE EPISODIC HEADACHE, UNSPECIFIED HEADACHE TYPE: Primary | ICD-10-CM

## 2025-02-12 DIAGNOSIS — R51.9 NONINTRACTABLE EPISODIC HEADACHE, UNSPECIFIED HEADACHE TYPE: ICD-10-CM

## 2025-02-12 PROCEDURE — 70450 CT HEAD/BRAIN W/O DYE: CPT

## 2025-02-12 NOTE — TELEPHONE ENCOUNTER
Per Raghu Palomo PA-C, Called to schedule Head CT for patient. Schedule scan at [facility] at 3:30 pm today 2/12/25. Called to inform patient of appointment time and location.  Patient verbalized and accepted appointment.

## 2025-02-21 DIAGNOSIS — I48.91 ATRIAL FIBRILLATION WITH RAPID VENTRICULAR RESPONSE (HCC): ICD-10-CM

## 2025-02-24 NOTE — PROGRESS NOTES
Assessment & Plan  PAF (paroxysmal atrial fibrillation) (HCC)  Diagnosed 12/30/2024 started on Cardizem and underwent DCCV then  Started on Eliquis 5 mg twice daily but discontinued per patient monitored with Butch de souza  Does not tolerate rate control (tried propranolol, bisoprolol, Cardizem SR/ER)  Declined loop recorder and Apple Watch in the past  Self DC'd Eliquis (vague side effects, sweating, fatigue) [id number] score of 1  S/p A-fib ablation with PVI.  Typical atrial flutter noninducible nor SVT  Postoperatively still with palpitations and headache CT negative for bleed/stroke  EKG here: sin us with frequent PAC's   She is exceedingly anxious regarding her a fib. Spent a lot of time reassuring her.   Plan:  Continue current meds.   Consider discontinuation of blood thinner 2 months post ablation... will discuss with Dr. Haynes.   She does us Butch de souza   Anxiety  Recently started on venlafaxine by outpatient PCP  But self discontinued due to side effects   I recommended that she follow up with a psychiatrist as she does not like medications    Electrophysiology Office Note    Estella Laird  1965  [id number]  [facility] [place] [id number] [place] [id number]      History of Present Illness     HPI/INTERVAL HISTORY:  Estella is a 59-year-old female with past medical history as mentioned above who presents to the EP clinic today for follow-up after atrial fibrillation ablation.  Briefly, her atrial fibrillation initially was diagnosed on 1/30/2024 when she presented with palpitations and was found to be in atrial fibrillation.  She was started on Cardizem at that time as well as anticoagulation with Eliquis.  She spontaneously converted prior to MARIAH/DCCV.  Over the course of about a year she had gone through various rate control medications including bisoprolol, propranolol, Cardizem MR/TR but was intolerant.  She was also intolerant to Eliquis in the  past per chart review with vague symptoms of fatigue.  She was seen by Dr. Guerrero in the outpatient setting in early January of this year recommended for A-fib ablation which she underwent later on at month with PFA and pulmonary vein isolation.  Postoperative course was complicated by various vague complaints including headaches and palpitations.  CT head did not show any evidence of acute stroke.  She has been maintained on blood thinners since time of ablation.      Review of Systems   Constitutional:  Negative for diaphoresis and fatigue.   Respiratory:  Negative for shortness of breath.    Cardiovascular:  Negative for chest pain and palpitations.   Neurological:  Negative for dizziness and light-headedness.   Psychiatric/Behavioral:  The patient is nervous/anxious.      ROS as noted above, otherwise 12 point review of systems was performed and is negative.       Historical Information   Social History     Socioeconomic History    Marital status: /Civil Union     Spouse name: Not on file    Number of children: Not on file    Years of education: Not on file    Highest education level: Not on file   Occupational History    Occupation: Syntertainmenter   Tobacco Use    Smoking status: Former     Current packs/day: 0.00     Average packs/day: 0.3 packs/day for 20.0 years (5.0 ttl pk-yrs)     Types: Cigarettes     Start date: 2003     Quit date: 2023     Years since quittin.4    Smokeless tobacco: Never    Tobacco comments:     1 cig a day 10/2023 Quit   Vaping Use    Vaping status: Never Used   Substance and Sexual Activity    Alcohol use: Yes     Alcohol/week: 7.0 standard drinks of alcohol     Types: 7 Glasses of wine per week     Comment: DAILY  1 GLASS WINE    Drug use: Never    Sexual activity: Not on file   Other Topics Concern    Not on file   Social History Narrative    Not on file     Social Drivers of Health     Financial Resource Strain: Not on file   Food Insecurity: Not on file    Transportation Needs: Not on file   Physical Activity: Not on file   Stress: Not on file   Social Connections: Unknown (12/3/2023)    Received from [facility], [facility]    Social Connection and Isolation Panel [NHANES]     Frequency of Communication with Friends and Family: Not on file     Frequency of Social Gatherings with Friends and Family: Not on file     Attends Christian Services: Not on file     Active Member of Clubs or Organizations: Not on file     Attends Club or Organization Meetings: Not on file     Marital Status:    Intimate Partner Violence: Not At Risk (12/3/2023)    Received from [facility], [facility]    Humiliation, Afraid, Rape, and Kick questionnaire     Fear of Current or Ex-Partner: No     Emotionally Abused: No     Physically Abused: No     Sexually Abused: No   Housing Stability: Not on file     Past Medical History:   Diagnosis Date    A-fib (formerly [facility]) 01/29/2024    Anxiety     Breast lump in female     left-  lumpectomy today 10/31/2023    Hypertension     SVT (supraventricular tachycardia) (formerly [facility]) 01/29/2024     Past Surgical History:   Procedure Laterality Date    AUGMENTATION MAMMAPLASTY Bilateral 2000    saline    BREAST BIOPSY Left 09/26/2023    BREAST LUMPECTOMY Left 10/31/2023    Procedure: LUMPECTOMY BREAST PELON  LOCALIZED;  Surgeon: Magalie Hubbard MD;  [facility];  Service: Surgical Oncology    CARDIAC ELECTROPHYSIOLOGY PROCEDURE N/A 1/20/2025    Procedure: Cardiac eps/afib ablation PFA;  Surgeon: Elias Haynes DO;  [facility];  Service: Cardiology    COLONOSCOPY      EGD  09/30/2021    Intrinsic moderate stenosis dilated with 54 Guyanese Littlejohn, erosive gastritis-biopsy negative for H. pylori by Dr. Michele    [facility]. Bx: Chronic esophagitis and reactive squamous change; no glandular epithelium or esoinophilia;chronic  esophagitis with eosinophilia.    HYSTERECTOMY      age 51    US BREAST NEEDLE LOC LEFT Left 10/27/2023    US GUIDED BREAST BIOPSY LEFT COMPLETE Left 2023     Social History     Substance and Sexual Activity   Alcohol Use Yes    Alcohol/week: 7.0 standard drinks of alcohol    Types: 7 Glasses of wine per week    Comment: DAILY  1 GLASS WINE     Social History     Substance and Sexual Activity   Drug Use Never     Social History     Tobacco Use   Smoking Status Former    Current packs/day: 0.00    Average packs/day: 0.3 packs/day for 20.0 years (5.0 ttl pk-yrs)    Types: Cigarettes    Start date: 2003    Quit date: 2023    Years since quittin.4   Smokeless Tobacco Never   Tobacco Comments    1 cig a day 10/2023 Quit     Family History   Problem Relation Age of Onset    Breast cancer Mother 63        again at 71 years old.    No Known Problems Father     No Known Problems Daughter     No Known Problems Maternal Grandmother     No Known Problems Maternal Grandfather     No Known Problems Paternal Grandmother     No Known Problems Paternal Grandfather     Breast cancer Maternal Aunt 65    Pancreatic cancer Maternal Uncle         80's       Meds/Allergies       Current Outpatient Medications:     ALPRAZolam (XANAX) 0.25 mg tablet, Take 0.25 mg by mouth Three times daily as needed, Disp: , Rfl:     clonazePAM (KlonoPIN) 0.5 mg tablet, Take 0.5 mg by mouth daily at bedtime, Disp: , Rfl:     colchicine (COLCRYS) 0.6 mg tablet, Take 1 tablet (0.6 mg total) by mouth daily, Disp: 30 tablet, Rfl: 0    desonide (DESOWEN) 0.05 % cream, Apply topically if needed for irritation, Disp: , Rfl:     famotidine (PEPCID) 40 MG tablet, Take 1 tablet (40 mg total) by mouth 2 (two) times a day, Disp: 60 tablet, Rfl: 2    methylPREDNISolone acetate (DEPO-MEDROL) 40 mg/mL injection, administered, Disp: , Rfl:     Miconazole-Zinc Oxide-Petrolat 0.25-15-81.35 % OINT, if needed (for rash on lips), Disp: , Rfl:     rivaroxaban  (Xarelto) 20 mg tablet, Take 1 tablet (20 mg total) by mouth daily with breakfast, Disp: 30 tablet, Rfl: 5    tacrolimus (PROTOPIC) 0.1 % ointment, APPLY TO AFFECTED AREA S) TWO TIMES A DAY AS NEEDED, Disp: , Rfl:     valACYclovir (VALTREX) 1,000 mg tablet, if needed, Disp: , Rfl:     valsartan (DIOVAN) 40 mg tablet, 80 mg daily, Disp: , Rfl:     Allergies   Allergen Reactions    Celebrex [Celecoxib] Tongue Swelling    Sulfa Antibiotics Hives and Tongue Swelling    Amlodipine Other (See Comments)     Possible  ankle swelling on 5 mg    Latex      Added based on information entered during case entry, please review and add reactions, type, and severity as needed    Lisinopril Other (See Comments)     dizziness    Methocarbamol Other (See Comments)     Nightmares    Other Sneezing     Seasonal allergies    Sulfamethoxazole-Trimethoprim Hives       Objective   Vitals: There were no vitals taken for this visit.        Physical Exam  Vitals and nursing note reviewed.   Constitutional:       General: She is not in acute distress.  HENT:      Head: Normocephalic and atraumatic.   Cardiovascular:      Rate and Rhythm: Normal rate and regular rhythm.   Pulmonary:      Effort: Pulmonary effort is normal. No respiratory distress.      Breath sounds: Normal breath sounds.   Skin:     General: Skin is warm and dry.      Coloration: Skin is not jaundiced.   Neurological:      General: No focal deficit present.      Mental Status: She is alert and oriented to person, place, and time.   Psychiatric:      Comments: Very anxious she is tearful in the office            Labs:  Admission on 01/20/2025, Discharged on 01/20/2025   Component Date Value    Sodium 01/20/2025 142     Potassium 01/20/2025 3.7     Chloride 01/20/2025 105     CO2 01/20/2025 29     ANION GAP 01/20/2025 8     BUN 01/20/2025 12     Creatinine 01/20/2025 0.66     Glucose 01/20/2025 90     Glucose, Fasting 01/20/2025 90     Calcium 01/20/2025 9.5     eGFR 01/20/2025  96     WBC 01/20/2025 6.76     RBC 01/20/2025 4.88     Hemoglobin 01/20/2025 14.7     Hematocrit 01/20/2025 43.6     MCV 01/20/2025 89     MCH 01/20/2025 30.1     MCHC 01/20/2025 33.7     RDW 01/20/2025 11.8     Platelets 01/20/2025 403 (H)     MPV 01/20/2025 9.6     Protime 01/20/2025 14.0     INR 01/20/2025 1.05     Activated Clotting Time,* 01/20/2025 299 (H)     Specimen Type 01/20/2025 VENOUS     Ventricular Rate 01/20/2025 77     Atrial Rate 01/20/2025 77     ND Interval 01/20/2025 138     QRSD Interval 01/20/2025 90     QT Interval 01/20/2025 388     QTC Interval 01/20/2025 440     P Axis 01/20/2025 63     QRS Axis 01/20/2025 -2     T Wave [place] 01/20/2025 32     Activated Clotting Time,* 01/20/2025 318 (H)     Specimen Type 01/20/2025 VENOUS     Activated Clotting Time,* 01/20/2025 287 (H)     Specimen Type 01/20/2025 VENOUS     Activated Clotting Time,* 01/20/2025 299 (H)     Specimen Type 01/20/2025 VENOUS     Ventricular Rate 01/20/2025 94     Atrial Rate 01/20/2025 94     ND Interval 01/20/2025 148     QRSD Interval 01/20/2025 108     QT Interval 01/20/2025 386     QTC Interval 01/20/2025 482     P Axis 01/20/2025 74     QRS Axis 01/20/2025 77     T Wave Axis 01/20/2025 68     Ventricular Rate 01/20/2025 92     Atrial Rate 01/20/2025 92     ND Interval 01/20/2025 150     QRSD Interval 01/20/2025 108     QT Interval 01/20/2025 392     QTC Interval 01/20/2025 484     P Axis 01/20/2025 71     QRS Axis 01/20/2025 78     T Wave Axis 01/20/2025 68    Telephone on 01/03/2025   Component Date Value    WBC 01/06/2025 6.27     RBC 01/06/2025 4.85     Hemoglobin 01/06/2025 14.7     Hematocrit 01/06/2025 43.7     MCV 01/06/2025 90     MCH 01/06/2025 30.3     MCHC 01/06/2025 33.6     RDW 01/06/2025 11.5 (L)     MPV 01/06/2025 10.2     Platelets 01/06/2025 410 (H)     nRBC 01/06/2025 0     Segmented % 01/06/2025 58     Immature Grans % 01/06/2025 0     Lymphocytes % 01/06/2025 30     Monocytes % 01/06/2025 8      Eosinophils Relative 01/06/2025 3     Basophils Relative 01/06/2025 1     Absolute Neutrophils 01/06/2025 3.59     Absolute Immature Grans 01/06/2025 0.02     Absolute Lymphocytes 01/06/2025 1.88     Absolute Monocytes 01/06/2025 0.52     Eosinophils Absolute 01/06/2025 0.20     Basophils Absolute 01/06/2025 0.06     Sodium 01/06/2025 140     Potassium 01/06/2025 4.1     Chloride 01/06/2025 104     CO2 01/06/2025 29     ANION GAP 01/06/2025 7     BUN 01/06/2025 10     Creatinine 01/06/2025 0.65     Glucose, Fasting 01/06/2025 88     Calcium 01/06/2025 9.3     AST 01/06/2025 19     ALT 01/06/2025 22     Alkaline Phosphatase 01/06/2025 64     Total Protein 01/06/2025 6.9     Albumin 01/06/2025 4.3     Total Bilirubin 01/06/2025 0.79     eGFR 01/06/2025 96        Imaging: I have personally reviewed pertinent reports.

## 2025-02-27 ENCOUNTER — OFFICE VISIT (OUTPATIENT)
Dept: CARDIOLOGY CLINIC | Facility: CLINIC | Age: 60
End: 2025-02-27
Payer: COMMERCIAL

## 2025-02-27 ENCOUNTER — RESULTS FOLLOW-UP (OUTPATIENT)
Dept: CARDIOLOGY CLINIC | Facility: CLINIC | Age: 60
End: 2025-02-27

## 2025-02-27 VITALS
HEIGHT: 63 IN | BODY MASS INDEX: 20.75 KG/M2 | HEART RATE: 84 BPM | SYSTOLIC BLOOD PRESSURE: 122 MMHG | DIASTOLIC BLOOD PRESSURE: 88 MMHG | WEIGHT: 117.1 LBS

## 2025-02-27 DIAGNOSIS — I48.0 PAF (PAROXYSMAL ATRIAL FIBRILLATION) (HCC): Primary | ICD-10-CM

## 2025-02-27 DIAGNOSIS — F41.9 ANXIETY: ICD-10-CM

## 2025-02-27 PROCEDURE — 93000 ELECTROCARDIOGRAM COMPLETE: CPT | Performed by: PHYSICIAN ASSISTANT

## 2025-02-27 PROCEDURE — 99213 OFFICE O/P EST LOW 20 MIN: CPT | Performed by: PHYSICIAN ASSISTANT

## 2025-02-27 RX ORDER — MULTIVITAMIN
1 TABLET ORAL DAILY
COMMUNITY

## 2025-03-03 ENCOUNTER — APPOINTMENT (OUTPATIENT)
Dept: LAB | Age: 60
End: 2025-03-03
Payer: COMMERCIAL

## 2025-03-03 ENCOUNTER — TELEPHONE (OUTPATIENT)
Age: 60
End: 2025-03-03

## 2025-03-03 DIAGNOSIS — N60.92 ATYPICAL DUCTAL HYPERPLASIA OF LEFT BREAST: ICD-10-CM

## 2025-03-03 LAB
BUN SERPL-MCNC: 10 MG/DL (ref 5–25)
CREAT SERPL-MCNC: 0.62 MG/DL (ref 0.6–1.3)
GFR SERPL CREATININE-BSD FRML MDRD: 98 ML/MIN/1.73SQ M

## 2025-03-03 PROCEDURE — 82565 ASSAY OF CREATININE: CPT

## 2025-03-03 PROCEDURE — 84520 ASSAY OF UREA NITROGEN: CPT

## 2025-03-03 PROCEDURE — 36415 COLL VENOUS BLD VENIPUNCTURE: CPT

## 2025-03-03 NOTE — TELEPHONE ENCOUNTER
Patient calling in to report is concerned about her scheduled breast MRI on 3/24.  She recently had a cardiac ablation and is currently in a blanking period until 4/20/25 and is asking if she is ok to proceed with the MRI and contrast as scheduled or should this be pushed back until after the blanking time has ended.

## 2025-03-04 ENCOUNTER — TELEPHONE (OUTPATIENT)
Dept: SURGICAL ONCOLOGY | Facility: CLINIC | Age: 60
End: 2025-03-04

## 2025-03-04 NOTE — TELEPHONE ENCOUNTER
Spoke with pt regarding her question regarding if she needs to move her MRI further out due to recent procedure she had.  She spoke to cardiologist who confirmed that she is fine to have the MRI as already scheduled.

## 2025-03-05 ENCOUNTER — TELEPHONE (OUTPATIENT)
Age: 60
End: 2025-03-05

## 2025-03-05 NOTE — TELEPHONE ENCOUNTER
Pt called asking to speak specifically with Dr Haynes and not the AP's regarding a personal matter. She stated she spoke with him during and after the ablation and would like to speak with him again. Please call

## 2025-05-19 ENCOUNTER — HOSPITAL ENCOUNTER (OUTPATIENT)
Dept: RADIOLOGY | Facility: HOSPITAL | Age: 60
Discharge: HOME | End: 2025-05-19
Payer: COMMERCIAL

## 2025-05-19 DIAGNOSIS — Z80.3 FAMILY HISTORY OF BREAST CANCER: ICD-10-CM

## 2025-05-19 DIAGNOSIS — N60.92 ATYPICAL DUCTAL HYPERPLASIA OF LEFT BREAST: ICD-10-CM

## 2025-05-19 PROCEDURE — C8937 CAD BREAST MRI: HCPCS

## 2025-05-19 PROCEDURE — C8908 MRI W/O FOL W/CONT, BREAST,: HCPCS

## 2025-05-19 PROCEDURE — A9585 GADOBUTROL INJECTION: HCPCS | Performed by: NURSE PRACTITIONER

## 2025-05-19 RX ORDER — GADOBUTROL 604.72 MG/ML
5 INJECTION INTRAVENOUS
Status: COMPLETED | OUTPATIENT
Start: 2025-05-19 | End: 2025-05-19

## 2025-05-19 RX ADMIN — GADOBUTROL 5 ML: 604.72 INJECTION INTRAVENOUS at 18:31

## 2025-05-20 ENCOUNTER — RESULTS FOLLOW-UP (OUTPATIENT)
Dept: SURGICAL ONCOLOGY | Facility: CLINIC | Age: 60
End: 2025-05-20

## 2025-05-22 ENCOUNTER — TELEPHONE (OUTPATIENT)
Dept: NON INVASIVE DIAGNOSTICS | Facility: HOSPITAL | Age: 60
End: 2025-05-22

## 2025-07-07 ENCOUNTER — OFFICE VISIT (OUTPATIENT)
Dept: SURGICAL ONCOLOGY | Facility: CLINIC | Age: 60
End: 2025-07-07
Payer: COMMERCIAL

## 2025-07-07 VITALS
DIASTOLIC BLOOD PRESSURE: 70 MMHG | OXYGEN SATURATION: 98 % | WEIGHT: 122 LBS | HEIGHT: 63 IN | TEMPERATURE: 97.7 F | BODY MASS INDEX: 21.62 KG/M2 | SYSTOLIC BLOOD PRESSURE: 122 MMHG | HEART RATE: 79 BPM

## 2025-07-07 DIAGNOSIS — N60.92 ATYPICAL DUCTAL HYPERPLASIA OF LEFT BREAST: Primary | ICD-10-CM

## 2025-07-07 DIAGNOSIS — Z13.71 BRCA NEGATIVE: ICD-10-CM

## 2025-07-07 PROCEDURE — 99213 OFFICE O/P EST LOW 20 MIN: CPT | Performed by: SURGERY

## 2025-07-07 NOTE — PROGRESS NOTES
Name: Estella Laird      : 1965      MRN: [id number]  Encounter Provider: Magalie Hubbard MD  Encounter Date: 2025   Encounter department: [facility] [place]  :  Assessment & Plan  Atypical ductal hyperplasia of left breast         BRCA negative         60-year-old female status post left lumpectomy for atypical ductal hyperplasia.  She had genetic testing secondary to family history of breast and pancreatic cancer.  Her testing was negative.  There is no evidence of disease based on exam today.  She is already scheduled for an upcoming mammogram.  I addressed her concerns with both the last mammogram and MRI.  She is agreeable to continuing annual mammography.  We discussed doing breast MRI every other year.  She is scheduled to see me again in 6 months.  We also discussed potentially going to annual exams.  I will see her again as scheduled or sooner should the need arise.        History of Present Illness   Estella Laird is a 60 y.o. year old female who presents for follow-up visit secondary to her history of atypical ductal hyperplasia.  She reports no current breast referable concerns.  She did not have a good experience at her last 2 imaging encounters to include the May MRI and September mammogram.  She is questioning if she could have ultrasound in lieu of these imaging modalities.       Review of Systems   Constitutional: Negative.  Negative for appetite change, fever and unexpected weight change.   HENT: Negative.  Negative for trouble swallowing.    Eyes: Negative.    Respiratory: Negative.  Negative for cough and shortness of breath.    Cardiovascular: Negative.  Negative for chest pain.   Gastrointestinal: Negative.  Negative for abdominal pain, nausea and vomiting.   Endocrine: Negative.    Genitourinary: Negative.  Negative for dysuria.   Musculoskeletal: Negative.  Negative for arthralgias and myalgias.   Skin: Negative.    Allergic/Immunologic: Negative.   "  Neurological: Negative.  Negative for headaches.   Hematological: Negative.  Negative for adenopathy. Does not bruise/bleed easily.   Psychiatric/Behavioral: Negative.      A complete review of systems is negative other than that noted above in the HPI.    Past Medical History   Past Medical History[1]  Past Surgical History[2]  Family History[3]   reports that she quit smoking about 21 months ago. Her smoking use included cigarettes. She started smoking about 21 years ago. She has a 5 pack-year smoking history. She has never used smokeless tobacco. She reports that she does not currently use alcohol after a past usage of about 7.0 standard drinks of alcohol per week. She reports that she does not use drugs.  Current Outpatient Medications   Medication Instructions    ALPRAZolam (XANAX) 0.25 mg, 3 times daily PRN    clonazePAM (KLONOPIN) 0.5 mg, Daily at bedtime    desonide (DESOWEN) 0.05 % cream As needed    famotidine (PEPCID) 40 mg, Oral, 2 times daily    methylPREDNISolone acetate (DEPO-MEDROL) 40 mg/mL injection     Multiple Vitamin (multivitamin) tablet 1 tablet, Daily    valACYclovir (VALTREX) 1,000 mg tablet As needed    valsartan (DIOVAN) 80 mg, Daily   Allergies[4]        Objective   /70 (BP Location: Right arm, Patient Position: Sitting, Cuff Size: Standard)   Pulse 79   Temp 97.7 °F (36.5 °C) (Temporal)   Ht 5' 3\" (1.6 m)   Wt 55.3 kg (122 lb)   SpO2 98%   BMI 21.61 kg/m²     Pain Screening:  Pain Score:   1 (tenderness in both breasts)  ECOG    Physical Exam  Constitutional:       General: She is not in acute distress.     Appearance: Normal appearance. She is well-developed.   HENT:      Head: Normocephalic and atraumatic.   Chest:   Breasts:     Right: No swelling, bleeding, inverted nipple, mass, nipple discharge, skin change or tenderness.      Left: Skin change (Lumpectomy scar) present. No swelling, bleeding, inverted nipple, mass, nipple discharge or tenderness.      Comments: " Bilateral implants    Musculoskeletal:      Right lower leg: No edema.      Left lower leg: No edema.   Lymphadenopathy:      Upper Body:      Right upper body: No supraclavicular, axillary or pectoral adenopathy.      Left upper body: No supraclavicular, axillary or pectoral adenopathy.     Neurological:      Mental Status: She is alert and oriented to person, place, and time.     Psychiatric:         Mood and Affect: Mood normal.          Labs: I have reviewed pertinent labs.     No visits with results within 1 Month(s) from this visit.   Latest known visit with results is:   Appointment on 03/03/2025   Component Date Value Ref Range Status    BUN 03/03/2025 10  5 - 25 mg/dL Final    Creatinine 03/03/2025 0.62  0.60 - 1.30 mg/dL Final    Standardized to IDMS reference method    eGFR 03/03/2025 98  ml/min/1.73sq m Final         Radiology Results Review: I personally reviewed the following image studies in PACS and associated radiology reports: 9/23/2024 bilateral 3D screening mammogram, 5/19/2025 bilateral breast MRI. My interpretation of the radiology images/reports is: No evidence of malignancy.         [1]   Past Medical History:  Diagnosis Date    A-fib ([facility]) 01/29/2024    Anxiety     Breast lump in female     left-  lumpectomy today 10/31/2023    Hypertension     SVT (supraventricular tachycardia) ([facility]) 01/29/2024   [2]   Past Surgical History:  Procedure Laterality Date    AUGMENTATION MAMMAPLASTY Bilateral 2000    saline    BREAST BIOPSY Left 09/26/2023    BREAST LUMPECTOMY Left 10/31/2023    Procedure: LUMPECTOMY BREAST PELON  LOCALIZED;  Surgeon: Magalie Hubbard MD;  [facility];  Service: Surgical Oncology    CARDIAC ELECTROPHYSIOLOGY PROCEDURE N/A 1/20/2025    Procedure: Cardiac eps/afib ablation PFA;  Surgeon: Elias Haynes DO;  [facility];  Service: Cardiology    COLONOSCOPY      EGD  09/30/2021    Intrinsic moderate stenosis dilated with 54 [place] Littlejohn, erosive  gastritis-biopsy negative for H. pylori by Dr. Michele    [facility] Es[facility]. Bx: Chronic esophagitis and reactive squamous change; no glandular epithelium or esoinophilia;chronic esophagitis with eosinophilia.    HYSTERECTOMY      age 51    US BREAST NEEDLE LOC LEFT Left 10/27/2023    US GUIDED BREAST BIOPSY LEFT COMPLETE Left 09/26/2023   [3]   Family History  Problem Relation Name Age of Onset    Breast cancer Mother miguel Groves        again at 71 years old.    No Known Problems Father      No Known Problems Daughter      No Known Problems Maternal Grandmother      No Known Problems Maternal Grandfather      No Known Problems Paternal Grandmother      No Known Problems Paternal Grandfather      Breast cancer Maternal Aunt Tati 65    Pancreatic cancer Maternal Uncle          80's   [4]   Allergies  Allergen Reactions    Celebrex [Celecoxib] Tongue Swelling    Sulfa Antibiotics Hives and Tongue Swelling    Amlodipine Other (See Comments)     Possible  ankle swelling on 5 mg    Latex      Added based on information entered during case entry, please review and add reactions, type, and severity as needed    Lisinopril Other (See Comments)     dizziness    Methocarbamol Other (See Comments)     Nightmares    Other Sneezing     Seasonal allergies    Sulfamethoxazole-Trimethoprim Hives

## 2025-07-17 ENCOUNTER — TELEPHONE (OUTPATIENT)
Age: 60
End: 2025-07-17

## 2025-07-17 ENCOUNTER — TELEPHONE (OUTPATIENT)
Dept: CARDIOLOGY CLINIC | Facility: CLINIC | Age: 60
End: 2025-07-17

## 2025-07-17 NOTE — TELEPHONE ENCOUNTER
Patient calling, states she woke up this morning with tachycardia, and her HR has been elevated since, feeling fatigued. She had ablation in January.   Warm transferred to [place] at [facility].

## 2025-07-17 NOTE — TELEPHONE ENCOUNTER
Patient called C/o tachycardia, since the morning. HR is between 110-100. She feels very fatigue and lethargic, patient has kardiamobile and it detected NSR/Tachy without any A-fib reading. Spoke briefly with Dr. Haynes, recommended to send over kardiamobiles to us so we can view, and get back to patient after that. No BP attainable.

## 2025-08-01 ENCOUNTER — HOSPITAL ENCOUNTER (OUTPATIENT)
Dept: ULTRASOUND IMAGING | Facility: HOSPITAL | Age: 60
Discharge: HOME | End: 2025-08-01
Payer: COMMERCIAL

## 2025-08-01 DIAGNOSIS — N28.1 RENAL CYST, RIGHT: ICD-10-CM

## 2025-08-01 PROCEDURE — 76775 US EXAM ABDO BACK WALL LIM: CPT

## 2025-08-11 ENCOUNTER — PATIENT MESSAGE (OUTPATIENT)
Age: 60
End: 2025-08-11

## 2025-08-12 ENCOUNTER — PATIENT MESSAGE (OUTPATIENT)
Age: 60
End: 2025-08-12

## 2025-08-13 ENCOUNTER — TELEPHONE (OUTPATIENT)
Age: 60
End: 2025-08-13

## (undated) DEVICE — ROSEN CURVED WIRE GUIDE: Brand: ROSEN

## (undated) DEVICE — CATH ABLATION FARAWAVE PULSED FIELD 31MM

## (undated) DEVICE — PINNACLE INTRODUCER SHEATH: Brand: PINNACLE

## (undated) DEVICE — BETHLEHEM UNIVERSAL MINOR GEN: Brand: CARDINAL HEALTH

## (undated) DEVICE — DRAPE SHEET THREE QUARTER

## (undated) DEVICE — NEEDLE TRANSSEPTAL BRK-1 71CM

## (undated) DEVICE — CABLE CATH CONNECTION FARASTAR

## (undated) DEVICE — REF PATCH ENSITE X

## (undated) DEVICE — PLUMEPEN PRO 10FT

## (undated) DEVICE — TUBING SUCTION 5MM X 12 FT

## (undated) DEVICE — SUPER SPONGES,MEDIUM: Brand: KERLIX

## (undated) DEVICE — AMPLATZ EXTRA STIFF WIRE GUIDE: Brand: AMPLATZ

## (undated) DEVICE — CATH EP  INQUIRY 6F 2-5-2MM  MED CRV STERABLE  DECAPOLAR 110CM

## (undated) DEVICE — GLOVE SRG BIOGEL 6

## (undated) DEVICE — SUT MONOCRYL 4-0 PS-2 27 IN Y426H

## (undated) DEVICE — SUT SILK 2-0 SH 30 IN K833H

## (undated) DEVICE — CATH ULTRASOUND ACUNAV ICE 8FR 90CM GE VIVID-I

## (undated) DEVICE — MEDI-VAC YANKAUER SUCTION HANDLE W/BULBOUS AND CONTROL VENT: Brand: CARDINAL HEALTH

## (undated) DEVICE — DRAPE EQUIPMENT RF WAND

## (undated) DEVICE — 2000CC GUARDIAN II: Brand: GUARDIAN

## (undated) DEVICE — SHEATH STEERABLE FARADRIVE

## (undated) DEVICE — GUIDE SHEATH SLO 8.5 FR

## (undated) DEVICE — WET SKIN PREP TRAY: Brand: MEDLINE INDUSTRIES, INC.

## (undated) DEVICE — ADHESIVE SKIN HIGH VISCOSITY EXOFIN 1ML

## (undated) DEVICE — NEEDLE 25G X 1 1/2

## (undated) DEVICE — CATH EP ADVISOR HD GRID MAPPING 8F

## (undated) DEVICE — CATH EP  INQUIRY 6F 2-5-2MM  LRG CRV STERABLE DECAPOLAR 110CM

## (undated) DEVICE — SCD SEQUENTIAL COMPRESSION COMFORT SLEEVE MEDIUM KNEE LENGTH: Brand: KENDALL SCD

## (undated) DEVICE — SUT MONOCRYL 3-0 SH 27 IN Y416H